# Patient Record
Sex: FEMALE | Race: WHITE | NOT HISPANIC OR LATINO | Employment: FULL TIME | URBAN - METROPOLITAN AREA
[De-identification: names, ages, dates, MRNs, and addresses within clinical notes are randomized per-mention and may not be internally consistent; named-entity substitution may affect disease eponyms.]

---

## 2017-01-13 ENCOUNTER — COMMUNICATION - HEALTHEAST (OUTPATIENT)
Dept: FAMILY MEDICINE | Facility: CLINIC | Age: 52
End: 2017-01-13

## 2017-01-13 DIAGNOSIS — E78.00 HYPERCHOLESTEREMIA: ICD-10-CM

## 2017-01-27 ENCOUNTER — COMMUNICATION - HEALTHEAST (OUTPATIENT)
Dept: FAMILY MEDICINE | Facility: CLINIC | Age: 52
End: 2017-01-27

## 2017-01-31 ENCOUNTER — HOSPITAL ENCOUNTER (OUTPATIENT)
Dept: MAMMOGRAPHY | Facility: HOSPITAL | Age: 52
Discharge: HOME | End: 2017-01-31
Attending: FAMILY MEDICINE

## 2017-01-31 DIAGNOSIS — Z12.31 VISIT FOR SCREENING MAMMOGRAM: ICD-10-CM

## 2017-02-17 ENCOUNTER — COMMUNICATION - HEALTHEAST (OUTPATIENT)
Dept: FAMILY MEDICINE | Facility: CLINIC | Age: 52
End: 2017-02-17

## 2017-02-17 DIAGNOSIS — F34.1 DYSTHYMIC DISORDER: ICD-10-CM

## 2017-04-15 ENCOUNTER — COMMUNICATION - HEALTHEAST (OUTPATIENT)
Dept: FAMILY MEDICINE | Facility: CLINIC | Age: 52
End: 2017-04-15

## 2017-04-15 DIAGNOSIS — E78.00 HYPERCHOLESTEREMIA: ICD-10-CM

## 2017-04-20 ENCOUNTER — OFFICE VISIT - HEALTHEAST (OUTPATIENT)
Dept: FAMILY MEDICINE | Facility: CLINIC | Age: 52
End: 2017-04-20

## 2017-04-20 DIAGNOSIS — F34.1 DYSTHYMIC DISORDER: ICD-10-CM

## 2017-04-20 DIAGNOSIS — E78.00 HYPERCHOLESTEREMIA: ICD-10-CM

## 2017-04-20 LAB
CHOLEST SERPL-MCNC: 236 MG/DL
FASTING STATUS PATIENT QL REPORTED: NO
HDLC SERPL-MCNC: 54 MG/DL
LDLC SERPL CALC-MCNC: 148 MG/DL
TRIGL SERPL-MCNC: 172 MG/DL

## 2017-04-20 RX ORDER — IPRATROPIUM BROMIDE 42 UG/1
SPRAY, METERED NASAL
Refills: 0 | Status: SHIPPED | COMMUNITY
Start: 2017-02-01 | End: 2021-12-03

## 2017-04-20 ASSESSMENT — MIFFLIN-ST. JEOR: SCORE: 1386.26

## 2017-04-21 ENCOUNTER — COMMUNICATION - HEALTHEAST (OUTPATIENT)
Dept: FAMILY MEDICINE | Facility: CLINIC | Age: 52
End: 2017-04-21

## 2017-04-21 DIAGNOSIS — R53.83 FATIGUE: ICD-10-CM

## 2017-04-24 ENCOUNTER — COMMUNICATION - HEALTHEAST (OUTPATIENT)
Dept: FAMILY MEDICINE | Facility: CLINIC | Age: 52
End: 2017-04-24

## 2017-06-01 ENCOUNTER — COMMUNICATION - HEALTHEAST (OUTPATIENT)
Dept: FAMILY MEDICINE | Facility: CLINIC | Age: 52
End: 2017-06-01

## 2017-06-01 DIAGNOSIS — F34.1 DYSTHYMIC DISORDER: ICD-10-CM

## 2017-06-05 ENCOUNTER — COMMUNICATION - HEALTHEAST (OUTPATIENT)
Dept: FAMILY MEDICINE | Facility: CLINIC | Age: 52
End: 2017-06-05

## 2017-06-05 DIAGNOSIS — F41.1 ANXIETY STATE: ICD-10-CM

## 2017-06-07 ENCOUNTER — COMMUNICATION - HEALTHEAST (OUTPATIENT)
Dept: FAMILY MEDICINE | Facility: CLINIC | Age: 52
End: 2017-06-07

## 2017-06-23 ENCOUNTER — RECORDS - HEALTHEAST (OUTPATIENT)
Dept: ADMINISTRATIVE | Facility: OTHER | Age: 52
End: 2017-06-23

## 2017-07-06 ENCOUNTER — AMBULATORY - HEALTHEAST (OUTPATIENT)
Dept: LAB | Facility: CLINIC | Age: 52
End: 2017-07-06

## 2017-07-06 DIAGNOSIS — R53.83 FATIGUE: ICD-10-CM

## 2017-07-21 ENCOUNTER — COMMUNICATION - HEALTHEAST (OUTPATIENT)
Dept: FAMILY MEDICINE | Facility: CLINIC | Age: 52
End: 2017-07-21

## 2017-07-21 DIAGNOSIS — E78.00 HYPERCHOLESTEREMIA: ICD-10-CM

## 2017-12-21 ENCOUNTER — COMMUNICATION - HEALTHEAST (OUTPATIENT)
Dept: FAMILY MEDICINE | Facility: CLINIC | Age: 52
End: 2017-12-21

## 2018-01-04 ENCOUNTER — RECORDS - HEALTHEAST (OUTPATIENT)
Dept: ADMINISTRATIVE | Facility: OTHER | Age: 53
End: 2018-01-04

## 2018-01-07 ENCOUNTER — COMMUNICATION - HEALTHEAST (OUTPATIENT)
Dept: FAMILY MEDICINE | Facility: CLINIC | Age: 53
End: 2018-01-07

## 2018-01-07 DIAGNOSIS — F34.1 DYSTHYMIC DISORDER: ICD-10-CM

## 2018-01-23 ENCOUNTER — COMMUNICATION - HEALTHEAST (OUTPATIENT)
Dept: FAMILY MEDICINE | Facility: CLINIC | Age: 53
End: 2018-01-23

## 2018-01-23 DIAGNOSIS — E78.00 HYPERCHOLESTEREMIA: ICD-10-CM

## 2018-02-13 ENCOUNTER — RECORDS - HEALTHEAST (OUTPATIENT)
Dept: ADMINISTRATIVE | Facility: OTHER | Age: 53
End: 2018-02-13

## 2018-03-07 ENCOUNTER — OFFICE VISIT - HEALTHEAST (OUTPATIENT)
Dept: FAMILY MEDICINE | Facility: CLINIC | Age: 53
End: 2018-03-07

## 2018-03-07 DIAGNOSIS — E78.00 PURE HYPERCHOLESTEROLEMIA: ICD-10-CM

## 2018-03-07 DIAGNOSIS — M25.569 KNEE PAIN: ICD-10-CM

## 2018-03-07 DIAGNOSIS — Z00.00 ROUTINE GENERAL MEDICAL EXAMINATION AT A HEALTH CARE FACILITY: ICD-10-CM

## 2018-03-07 DIAGNOSIS — F34.1 DYSTHYMIC DISORDER: ICD-10-CM

## 2018-03-07 ASSESSMENT — MIFFLIN-ST. JEOR: SCORE: 1408.95

## 2018-03-12 LAB
HPV SOURCE: NORMAL
HUMAN PAPILLOMA VIRUS 16 DNA: NEGATIVE
HUMAN PAPILLOMA VIRUS 18 DNA: NEGATIVE
HUMAN PAPILLOMA VIRUS FINAL DIAGNOSIS: NORMAL
HUMAN PAPILLOMA VIRUS OTHER HR: NEGATIVE
SPECIMEN DESCRIPTION: NORMAL

## 2018-03-15 LAB
BKR LAB AP ABNORMAL BLEEDING: NO
BKR LAB AP BIRTH CONTROL/HORMONES: NORMAL
BKR LAB AP CERVICAL APPEARANCE: NORMAL
BKR LAB AP GYN ADEQUACY: NORMAL
BKR LAB AP GYN INTERPRETATION: NORMAL
BKR LAB AP HPV REFLEX: NORMAL
BKR LAB AP LMP: NORMAL
BKR LAB AP PATIENT STATUS: NORMAL
BKR LAB AP PREVIOUS ABNORMAL: NO
BKR LAB AP PREVIOUS NORMAL: 2015
HIGH RISK?: NO
PATH REPORT.COMMENTS IMP SPEC: NORMAL
RESULT FLAG (HE HISTORICAL CONVERSION): NORMAL

## 2018-06-02 ENCOUNTER — COMMUNICATION - HEALTHEAST (OUTPATIENT)
Dept: FAMILY MEDICINE | Facility: CLINIC | Age: 53
End: 2018-06-02

## 2018-06-25 ENCOUNTER — COMMUNICATION - HEALTHEAST (OUTPATIENT)
Dept: FAMILY MEDICINE | Facility: CLINIC | Age: 53
End: 2018-06-25

## 2018-06-25 DIAGNOSIS — F34.1 DYSTHYMIC DISORDER: ICD-10-CM

## 2018-07-20 ENCOUNTER — COMMUNICATION - HEALTHEAST (OUTPATIENT)
Dept: FAMILY MEDICINE | Facility: CLINIC | Age: 53
End: 2018-07-20

## 2018-07-20 DIAGNOSIS — E78.00 HYPERCHOLESTEREMIA: ICD-10-CM

## 2018-10-09 ENCOUNTER — COMMUNICATION - HEALTHEAST (OUTPATIENT)
Dept: FAMILY MEDICINE | Facility: CLINIC | Age: 53
End: 2018-10-09

## 2018-10-30 ENCOUNTER — COMMUNICATION - HEALTHEAST (OUTPATIENT)
Dept: FAMILY MEDICINE | Facility: CLINIC | Age: 53
End: 2018-10-30

## 2019-03-15 ENCOUNTER — COMMUNICATION - HEALTHEAST (OUTPATIENT)
Dept: FAMILY MEDICINE | Facility: CLINIC | Age: 54
End: 2019-03-15

## 2019-03-15 DIAGNOSIS — F34.1 DYSTHYMIC DISORDER: ICD-10-CM

## 2019-03-29 ENCOUNTER — COMMUNICATION - HEALTHEAST (OUTPATIENT)
Dept: FAMILY MEDICINE | Facility: CLINIC | Age: 54
End: 2019-03-29

## 2019-03-29 DIAGNOSIS — F41.1 ANXIETY STATE: ICD-10-CM

## 2019-05-07 ENCOUNTER — COMMUNICATION - HEALTHEAST (OUTPATIENT)
Dept: FAMILY MEDICINE | Facility: CLINIC | Age: 54
End: 2019-05-07

## 2019-05-07 DIAGNOSIS — E78.00 HYPERCHOLESTEREMIA: ICD-10-CM

## 2019-05-20 ENCOUNTER — RECORDS - HEALTHEAST (OUTPATIENT)
Dept: ADMINISTRATIVE | Facility: OTHER | Age: 54
End: 2019-05-20

## 2019-07-30 ENCOUNTER — COMMUNICATION - HEALTHEAST (OUTPATIENT)
Dept: FAMILY MEDICINE | Facility: CLINIC | Age: 54
End: 2019-07-30

## 2019-07-30 DIAGNOSIS — F34.1 DYSTHYMIC DISORDER: ICD-10-CM

## 2019-08-13 ENCOUNTER — COMMUNICATION - HEALTHEAST (OUTPATIENT)
Dept: FAMILY MEDICINE | Facility: CLINIC | Age: 54
End: 2019-08-13

## 2019-08-13 DIAGNOSIS — E78.00 HYPERCHOLESTEREMIA: ICD-10-CM

## 2019-08-25 ENCOUNTER — COMMUNICATION - HEALTHEAST (OUTPATIENT)
Dept: FAMILY MEDICINE | Facility: CLINIC | Age: 54
End: 2019-08-25

## 2019-08-25 DIAGNOSIS — F34.1 DYSTHYMIC DISORDER: ICD-10-CM

## 2019-09-23 ENCOUNTER — OFFICE VISIT - HEALTHEAST (OUTPATIENT)
Dept: FAMILY MEDICINE | Facility: CLINIC | Age: 54
End: 2019-09-23

## 2019-09-23 DIAGNOSIS — E66.9 OBESITY (BMI 30-39.9): ICD-10-CM

## 2019-09-23 DIAGNOSIS — F34.1 DYSTHYMIC DISORDER: ICD-10-CM

## 2019-09-23 DIAGNOSIS — Z78.0 POST-MENOPAUSAL: ICD-10-CM

## 2019-09-23 DIAGNOSIS — F10.11 ALCOHOL ABUSE, IN REMISSION: ICD-10-CM

## 2019-09-23 DIAGNOSIS — Z00.00 ROUTINE HISTORY AND PHYSICAL EXAMINATION OF ADULT: ICD-10-CM

## 2019-09-23 DIAGNOSIS — C50.919 MALIGNANT NEOPLASM OF FEMALE BREAST, UNSPECIFIED ESTROGEN RECEPTOR STATUS, UNSPECIFIED LATERALITY, UNSPECIFIED SITE OF BREAST (H): ICD-10-CM

## 2019-09-23 DIAGNOSIS — E78.00 HYPERCHOLESTEREMIA: ICD-10-CM

## 2019-09-23 LAB
ALBUMIN SERPL-MCNC: 4.1 G/DL (ref 3.5–5)
ALP SERPL-CCNC: 69 U/L (ref 45–120)
ALT SERPL W P-5'-P-CCNC: 29 U/L (ref 0–45)
ANION GAP SERPL CALCULATED.3IONS-SCNC: 6 MMOL/L (ref 5–18)
AST SERPL W P-5'-P-CCNC: 20 U/L (ref 0–40)
BILIRUB SERPL-MCNC: 0.3 MG/DL (ref 0–1)
BUN SERPL-MCNC: 21 MG/DL (ref 8–22)
CALCIUM SERPL-MCNC: 9.7 MG/DL (ref 8.5–10.5)
CHLORIDE BLD-SCNC: 108 MMOL/L (ref 98–107)
CHOLEST SERPL-MCNC: 294 MG/DL
CO2 SERPL-SCNC: 26 MMOL/L (ref 22–31)
CREAT SERPL-MCNC: 0.81 MG/DL (ref 0.6–1.1)
FASTING STATUS PATIENT QL REPORTED: YES
GFR SERPL CREATININE-BSD FRML MDRD: >60 ML/MIN/1.73M2
GLUCOSE BLD-MCNC: 103 MG/DL (ref 70–125)
HDLC SERPL-MCNC: 59 MG/DL
LDLC SERPL CALC-MCNC: 198 MG/DL
POTASSIUM BLD-SCNC: 4.5 MMOL/L (ref 3.5–5)
PROT SERPL-MCNC: 7.1 G/DL (ref 6–8)
SODIUM SERPL-SCNC: 140 MMOL/L (ref 136–145)
TRIGL SERPL-MCNC: 184 MG/DL
TSH SERPL DL<=0.005 MIU/L-ACNC: 4.77 UIU/ML (ref 0.3–5)

## 2019-09-23 ASSESSMENT — ANXIETY QUESTIONNAIRES
4. TROUBLE RELAXING: SEVERAL DAYS
7. FEELING AFRAID AS IF SOMETHING AWFUL MIGHT HAPPEN: SEVERAL DAYS
GAD7 TOTAL SCORE: 9
5. BEING SO RESTLESS THAT IT IS HARD TO SIT STILL: NOT AT ALL
2. NOT BEING ABLE TO STOP OR CONTROL WORRYING: SEVERAL DAYS
IF YOU CHECKED OFF ANY PROBLEMS ON THIS QUESTIONNAIRE, HOW DIFFICULT HAVE THESE PROBLEMS MADE IT FOR YOU TO DO YOUR WORK, TAKE CARE OF THINGS AT HOME, OR GET ALONG WITH OTHER PEOPLE: SOMEWHAT DIFFICULT
3. WORRYING TOO MUCH ABOUT DIFFERENT THINGS: MORE THAN HALF THE DAYS
6. BECOMING EASILY ANNOYED OR IRRITABLE: MORE THAN HALF THE DAYS
1. FEELING NERVOUS, ANXIOUS, OR ON EDGE: MORE THAN HALF THE DAYS

## 2019-09-23 ASSESSMENT — MIFFLIN-ST. JEOR: SCORE: 1449.66

## 2019-09-23 ASSESSMENT — PATIENT HEALTH QUESTIONNAIRE - PHQ9: SUM OF ALL RESPONSES TO PHQ QUESTIONS 1-9: 11

## 2019-09-26 ENCOUNTER — COMMUNICATION - HEALTHEAST (OUTPATIENT)
Dept: FAMILY MEDICINE | Facility: CLINIC | Age: 54
End: 2019-09-26

## 2019-09-27 ENCOUNTER — COMMUNICATION - HEALTHEAST (OUTPATIENT)
Dept: FAMILY MEDICINE | Facility: CLINIC | Age: 54
End: 2019-09-27

## 2019-09-27 ENCOUNTER — AMBULATORY - HEALTHEAST (OUTPATIENT)
Dept: FAMILY MEDICINE | Facility: CLINIC | Age: 54
End: 2019-09-27

## 2019-09-27 DIAGNOSIS — F34.1 DYSTHYMIC DISORDER: ICD-10-CM

## 2019-09-27 DIAGNOSIS — E78.00 HYPERCHOLESTEREMIA: ICD-10-CM

## 2019-10-07 ENCOUNTER — AMBULATORY - HEALTHEAST (OUTPATIENT)
Dept: SCHEDULING | Facility: CLINIC | Age: 54
End: 2019-10-07

## 2019-10-07 DIAGNOSIS — Z78.0 POST-MENOPAUSAL: ICD-10-CM

## 2019-10-07 DIAGNOSIS — C50.919 MALIGNANT NEOPLASM OF FEMALE BREAST, UNSPECIFIED ESTROGEN RECEPTOR STATUS, UNSPECIFIED LATERALITY, UNSPECIFIED SITE OF BREAST (H): ICD-10-CM

## 2019-11-27 ENCOUNTER — HOSPITAL ENCOUNTER (OUTPATIENT)
Dept: MAMMOGRAPHY | Facility: CLINIC | Age: 54
Discharge: HOME | End: 2019-11-27

## 2019-11-27 DIAGNOSIS — Z12.31 VISIT FOR SCREENING MAMMOGRAM: ICD-10-CM

## 2019-11-29 ENCOUNTER — OFFICE VISIT - HEALTHEAST (OUTPATIENT)
Dept: INTERNAL MEDICINE | Facility: CLINIC | Age: 54
End: 2019-11-29

## 2019-11-29 DIAGNOSIS — M54.2 NECK PAIN: ICD-10-CM

## 2019-12-16 ENCOUNTER — COMMUNICATION - HEALTHEAST (OUTPATIENT)
Dept: FAMILY MEDICINE | Facility: CLINIC | Age: 54
End: 2019-12-16

## 2019-12-16 DIAGNOSIS — F34.1 DYSTHYMIC DISORDER: ICD-10-CM

## 2020-01-16 ENCOUNTER — OFFICE VISIT - HEALTHEAST (OUTPATIENT)
Dept: SURGERY | Facility: CLINIC | Age: 55
End: 2020-01-16

## 2020-01-16 ENCOUNTER — AMBULATORY - HEALTHEAST (OUTPATIENT)
Dept: LAB | Facility: CLINIC | Age: 55
End: 2020-01-16

## 2020-01-16 DIAGNOSIS — R63.2 HYPERPHAGIA: ICD-10-CM

## 2020-01-16 DIAGNOSIS — E78.5 DYSLIPIDEMIA: ICD-10-CM

## 2020-01-16 DIAGNOSIS — E78.00 PURE HYPERCHOLESTEROLEMIA: ICD-10-CM

## 2020-01-16 DIAGNOSIS — E66.811 OBESITY, CLASS I, BMI 30.0-34.9 (SEE ACTUAL BMI): ICD-10-CM

## 2020-01-16 DIAGNOSIS — F50.20 BULIMIA NERVOSA: ICD-10-CM

## 2020-01-16 DIAGNOSIS — F32.A DEPRESSION, UNSPECIFIED DEPRESSION TYPE: ICD-10-CM

## 2020-01-16 DIAGNOSIS — M19.90 ARTHRITIS: ICD-10-CM

## 2020-01-16 DIAGNOSIS — M25.569 KNEE PAIN, UNSPECIFIED CHRONICITY, UNSPECIFIED LATERALITY: ICD-10-CM

## 2020-01-16 LAB
ERYTHROCYTE [DISTWIDTH] IN BLOOD BY AUTOMATED COUNT: 13.4 % (ref 11–14.5)
FERRITIN SERPL-MCNC: 110 NG/ML (ref 10–130)
FOLATE SERPL-MCNC: 15.2 NG/ML
HBA1C MFR BLD: 5.5 % (ref 3.5–6)
HCT VFR BLD AUTO: 40.6 % (ref 35–47)
HGB BLD-MCNC: 13.3 G/DL (ref 12–16)
MCH RBC QN AUTO: 30.7 PG (ref 27–34)
MCHC RBC AUTO-ENTMCNC: 32.8 G/DL (ref 32–36)
MCV RBC AUTO: 94 FL (ref 80–100)
PLATELET # BLD AUTO: 316 THOU/UL (ref 140–440)
PMV BLD AUTO: 9 FL (ref 8.5–12.5)
PTH-INTACT SERPL-MCNC: 33 PG/ML (ref 10–86)
RBC # BLD AUTO: 4.33 MILL/UL (ref 3.8–5.4)
VIT B12 SERPL-MCNC: 832 PG/ML (ref 213–816)
WBC: 4.6 THOU/UL (ref 4–11)

## 2020-01-16 ASSESSMENT — MIFFLIN-ST. JEOR
SCORE: 1453.18
SCORE: 1467.69

## 2020-01-21 LAB — VIT B1 PYROPHOSHATE BLD-SCNC: 125 NMOL/L (ref 70–180)

## 2020-01-27 ENCOUNTER — OFFICE VISIT - HEALTHEAST (OUTPATIENT)
Dept: FAMILY MEDICINE | Facility: CLINIC | Age: 55
End: 2020-01-27

## 2020-01-27 DIAGNOSIS — E66.811 OBESITY (BMI 30.0-34.9): ICD-10-CM

## 2020-01-27 DIAGNOSIS — F32.A DEPRESSION, UNSPECIFIED DEPRESSION TYPE: ICD-10-CM

## 2020-01-27 DIAGNOSIS — N95.0 POSTMENOPAUSAL BLEEDING: ICD-10-CM

## 2020-01-27 DIAGNOSIS — C50.919 MALIGNANT NEOPLASM OF FEMALE BREAST, UNSPECIFIED ESTROGEN RECEPTOR STATUS, UNSPECIFIED LATERALITY, UNSPECIFIED SITE OF BREAST (H): ICD-10-CM

## 2020-01-27 LAB
BASOPHILS # BLD AUTO: 0 THOU/UL (ref 0–0.2)
BASOPHILS NFR BLD AUTO: 1 % (ref 0–2)
EOSINOPHIL # BLD AUTO: 0.2 THOU/UL (ref 0–0.4)
EOSINOPHIL NFR BLD AUTO: 3 % (ref 0–6)
ERYTHROCYTE [DISTWIDTH] IN BLOOD BY AUTOMATED COUNT: 12.5 % (ref 11–14.5)
HCT VFR BLD AUTO: 39.6 % (ref 35–47)
HGB BLD-MCNC: 13.7 G/DL (ref 12–16)
LYMPHOCYTES # BLD AUTO: 2.4 THOU/UL (ref 0.8–4.4)
LYMPHOCYTES NFR BLD AUTO: 50 % (ref 20–40)
MCH RBC QN AUTO: 31.2 PG (ref 27–34)
MCHC RBC AUTO-ENTMCNC: 34.6 G/DL (ref 32–36)
MCV RBC AUTO: 90 FL (ref 80–100)
MONOCYTES # BLD AUTO: 0.4 THOU/UL (ref 0–0.9)
MONOCYTES NFR BLD AUTO: 8 % (ref 2–10)
NEUTROPHILS # BLD AUTO: 1.8 THOU/UL (ref 2–7.7)
NEUTROPHILS NFR BLD AUTO: 38 % (ref 50–70)
PLATELET # BLD AUTO: 295 THOU/UL (ref 140–440)
PMV BLD AUTO: 6.5 FL (ref 7–10)
RBC # BLD AUTO: 4.4 MILL/UL (ref 3.8–5.4)
WBC: 4.7 THOU/UL (ref 4–11)

## 2020-01-27 ASSESSMENT — ANXIETY QUESTIONNAIRES
GAD7 TOTAL SCORE: 5
2. NOT BEING ABLE TO STOP OR CONTROL WORRYING: NOT AT ALL
6. BECOMING EASILY ANNOYED OR IRRITABLE: MORE THAN HALF THE DAYS
4. TROUBLE RELAXING: SEVERAL DAYS
3. WORRYING TOO MUCH ABOUT DIFFERENT THINGS: NOT AT ALL
IF YOU CHECKED OFF ANY PROBLEMS ON THIS QUESTIONNAIRE, HOW DIFFICULT HAVE THESE PROBLEMS MADE IT FOR YOU TO DO YOUR WORK, TAKE CARE OF THINGS AT HOME, OR GET ALONG WITH OTHER PEOPLE: SOMEWHAT DIFFICULT
1. FEELING NERVOUS, ANXIOUS, OR ON EDGE: SEVERAL DAYS
7. FEELING AFRAID AS IF SOMETHING AWFUL MIGHT HAPPEN: NOT AT ALL
5. BEING SO RESTLESS THAT IT IS HARD TO SIT STILL: SEVERAL DAYS

## 2020-01-27 ASSESSMENT — PATIENT HEALTH QUESTIONNAIRE - PHQ9: SUM OF ALL RESPONSES TO PHQ QUESTIONS 1-9: 9

## 2020-01-28 ENCOUNTER — COMMUNICATION - HEALTHEAST (OUTPATIENT)
Dept: SURGERY | Facility: CLINIC | Age: 55
End: 2020-01-28

## 2020-02-04 ENCOUNTER — HOSPITAL ENCOUNTER (OUTPATIENT)
Dept: ULTRASOUND IMAGING | Facility: HOSPITAL | Age: 55
Discharge: HOME | End: 2020-02-04

## 2020-02-04 DIAGNOSIS — N95.0 POSTMENOPAUSAL BLEEDING: ICD-10-CM

## 2020-02-06 ENCOUNTER — COMMUNICATION - HEALTHEAST (OUTPATIENT)
Dept: FAMILY MEDICINE | Facility: CLINIC | Age: 55
End: 2020-02-06

## 2020-02-11 ENCOUNTER — COMMUNICATION - HEALTHEAST (OUTPATIENT)
Dept: SURGERY | Facility: CLINIC | Age: 55
End: 2020-02-11

## 2020-03-03 ENCOUNTER — COMMUNICATION - HEALTHEAST (OUTPATIENT)
Dept: FAMILY MEDICINE | Facility: CLINIC | Age: 55
End: 2020-03-03

## 2020-03-03 ENCOUNTER — COMMUNICATION - HEALTHEAST (OUTPATIENT)
Dept: SURGERY | Facility: CLINIC | Age: 55
End: 2020-03-03

## 2020-03-18 ENCOUNTER — COMMUNICATION - HEALTHEAST (OUTPATIENT)
Dept: FAMILY MEDICINE | Facility: CLINIC | Age: 55
End: 2020-03-18

## 2020-03-18 DIAGNOSIS — F34.1 DYSTHYMIC DISORDER: ICD-10-CM

## 2020-03-23 ENCOUNTER — COMMUNICATION - HEALTHEAST (OUTPATIENT)
Dept: FAMILY MEDICINE | Facility: CLINIC | Age: 55
End: 2020-03-23

## 2020-03-23 DIAGNOSIS — F34.1 DYSTHYMIC DISORDER: ICD-10-CM

## 2020-03-27 ENCOUNTER — COMMUNICATION - HEALTHEAST (OUTPATIENT)
Dept: SURGERY | Facility: CLINIC | Age: 55
End: 2020-03-27

## 2020-03-27 DIAGNOSIS — E78.5 DYSLIPIDEMIA: ICD-10-CM

## 2020-03-27 DIAGNOSIS — M25.569 KNEE PAIN, UNSPECIFIED CHRONICITY, UNSPECIFIED LATERALITY: ICD-10-CM

## 2020-03-27 DIAGNOSIS — R63.2 HYPERPHAGIA: ICD-10-CM

## 2020-03-27 DIAGNOSIS — F32.A DEPRESSION, UNSPECIFIED DEPRESSION TYPE: ICD-10-CM

## 2020-03-27 DIAGNOSIS — M19.90 ARTHRITIS: ICD-10-CM

## 2020-03-31 ENCOUNTER — OFFICE VISIT - HEALTHEAST (OUTPATIENT)
Dept: SURGERY | Facility: CLINIC | Age: 55
End: 2020-03-31

## 2020-03-31 DIAGNOSIS — E66.3 OVERWEIGHT (BMI 25.0-29.9): ICD-10-CM

## 2020-03-31 DIAGNOSIS — Z71.3 NUTRITIONAL COUNSELING: ICD-10-CM

## 2020-03-31 ASSESSMENT — MIFFLIN-ST. JEOR: SCORE: 1403.28

## 2020-04-23 ENCOUNTER — OFFICE VISIT - HEALTHEAST (OUTPATIENT)
Dept: SURGERY | Facility: CLINIC | Age: 55
End: 2020-04-23

## 2020-04-23 DIAGNOSIS — M25.569 KNEE PAIN, UNSPECIFIED CHRONICITY, UNSPECIFIED LATERALITY: ICD-10-CM

## 2020-04-23 DIAGNOSIS — R63.2 HYPERPHAGIA: ICD-10-CM

## 2020-04-23 DIAGNOSIS — F32.A DEPRESSION, UNSPECIFIED DEPRESSION TYPE: ICD-10-CM

## 2020-04-23 DIAGNOSIS — E78.5 DYSLIPIDEMIA: ICD-10-CM

## 2020-04-23 DIAGNOSIS — E66.811 OBESITY (BMI 30.0-34.9): ICD-10-CM

## 2020-04-23 DIAGNOSIS — M19.90 ARTHRITIS: ICD-10-CM

## 2020-04-23 ASSESSMENT — MIFFLIN-ST. JEOR: SCORE: 1439.57

## 2020-05-12 ENCOUNTER — COMMUNICATION - HEALTHEAST (OUTPATIENT)
Dept: SURGERY | Facility: CLINIC | Age: 55
End: 2020-05-12

## 2020-05-22 ENCOUNTER — COMMUNICATION - HEALTHEAST (OUTPATIENT)
Dept: ADMINISTRATIVE | Facility: CLINIC | Age: 55
End: 2020-05-22

## 2020-07-21 ENCOUNTER — COMMUNICATION - HEALTHEAST (OUTPATIENT)
Dept: FAMILY MEDICINE | Facility: CLINIC | Age: 55
End: 2020-07-21

## 2020-07-21 DIAGNOSIS — E78.00 PURE HYPERCHOLESTEROLEMIA: ICD-10-CM

## 2020-07-22 RX ORDER — ATORVASTATIN CALCIUM 20 MG/1
20 TABLET, FILM COATED ORAL DAILY
Qty: 90 TABLET | Refills: 3 | Status: SHIPPED | OUTPATIENT
Start: 2020-07-22 | End: 2021-09-03

## 2020-08-31 ENCOUNTER — COMMUNICATION - HEALTHEAST (OUTPATIENT)
Dept: FAMILY MEDICINE | Facility: CLINIC | Age: 55
End: 2020-08-31

## 2020-08-31 DIAGNOSIS — F34.1 DYSTHYMIC DISORDER: ICD-10-CM

## 2021-02-25 ENCOUNTER — COMMUNICATION - HEALTHEAST (OUTPATIENT)
Dept: FAMILY MEDICINE | Facility: CLINIC | Age: 56
End: 2021-02-25

## 2021-02-25 DIAGNOSIS — F34.1 DYSTHYMIC DISORDER: ICD-10-CM

## 2021-03-16 ENCOUNTER — COMMUNICATION - HEALTHEAST (OUTPATIENT)
Dept: FAMILY MEDICINE | Facility: CLINIC | Age: 56
End: 2021-03-16

## 2021-03-16 DIAGNOSIS — F34.1 DYSTHYMIC DISORDER: ICD-10-CM

## 2021-03-18 ENCOUNTER — COMMUNICATION - HEALTHEAST (OUTPATIENT)
Dept: FAMILY MEDICINE | Facility: CLINIC | Age: 56
End: 2021-03-18

## 2021-04-20 ENCOUNTER — COMMUNICATION - HEALTHEAST (OUTPATIENT)
Dept: FAMILY MEDICINE | Facility: CLINIC | Age: 56
End: 2021-04-20

## 2021-04-20 ENCOUNTER — OFFICE VISIT - HEALTHEAST (OUTPATIENT)
Dept: FAMILY MEDICINE | Facility: CLINIC | Age: 56
End: 2021-04-20

## 2021-04-20 DIAGNOSIS — L81.4 LENTIGO: ICD-10-CM

## 2021-04-20 DIAGNOSIS — F32.1 MODERATE MAJOR DEPRESSION (H): ICD-10-CM

## 2021-04-20 DIAGNOSIS — Z00.00 ROUTINE HISTORY AND PHYSICAL EXAMINATION OF ADULT: ICD-10-CM

## 2021-04-20 DIAGNOSIS — F34.1 DYSTHYMIC DISORDER: ICD-10-CM

## 2021-04-20 DIAGNOSIS — Z12.11 SPECIAL SCREENING FOR MALIGNANT NEOPLASMS, COLON: ICD-10-CM

## 2021-04-20 DIAGNOSIS — C50.919 MALIGNANT NEOPLASM OF FEMALE BREAST, UNSPECIFIED ESTROGEN RECEPTOR STATUS, UNSPECIFIED LATERALITY, UNSPECIFIED SITE OF BREAST (H): ICD-10-CM

## 2021-04-20 DIAGNOSIS — E78.00 PURE HYPERCHOLESTEROLEMIA: ICD-10-CM

## 2021-04-20 DIAGNOSIS — N39.0 URINARY TRACT INFECTION: ICD-10-CM

## 2021-04-20 DIAGNOSIS — E66.9 OBESITY (BMI 30-39.9): ICD-10-CM

## 2021-04-20 LAB
ALBUMIN SERPL-MCNC: 4.1 G/DL (ref 3.5–5)
ALBUMIN UR-MCNC: ABNORMAL G/DL
ALP SERPL-CCNC: 83 U/L (ref 45–120)
ALT SERPL W P-5'-P-CCNC: 23 U/L (ref 0–45)
ANION GAP SERPL CALCULATED.3IONS-SCNC: 12 MMOL/L (ref 5–18)
APPEARANCE UR: CLEAR
AST SERPL W P-5'-P-CCNC: 17 U/L (ref 0–40)
BACTERIA #/AREA URNS HPF: ABNORMAL /[HPF]
BASOPHILS # BLD AUTO: 0.1 THOU/UL (ref 0–0.2)
BASOPHILS NFR BLD AUTO: 1 % (ref 0–2)
BILIRUB SERPL-MCNC: 0.2 MG/DL (ref 0–1)
BILIRUB UR QL STRIP: NEGATIVE
BUN SERPL-MCNC: 21 MG/DL (ref 8–22)
CALCIUM SERPL-MCNC: 8.7 MG/DL (ref 8.5–10.5)
CHLORIDE BLD-SCNC: 102 MMOL/L (ref 98–107)
CHOLEST SERPL-MCNC: 226 MG/DL
CO2 SERPL-SCNC: 22 MMOL/L (ref 22–31)
COLOR UR AUTO: YELLOW
CREAT SERPL-MCNC: 1.06 MG/DL (ref 0.6–1.1)
EOSINOPHIL # BLD AUTO: 0.2 THOU/UL (ref 0–0.4)
EOSINOPHIL NFR BLD AUTO: 4 % (ref 0–6)
ERYTHROCYTE [DISTWIDTH] IN BLOOD BY AUTOMATED COUNT: 13.4 % (ref 11–14.5)
FASTING STATUS PATIENT QL REPORTED: NO
GFR SERPL CREATININE-BSD FRML MDRD: 54 ML/MIN/1.73M2
GLUCOSE BLD-MCNC: 96 MG/DL (ref 70–125)
GLUCOSE UR STRIP-MCNC: NEGATIVE MG/DL
HCT VFR BLD AUTO: 40.9 % (ref 35–47)
HDLC SERPL-MCNC: 50 MG/DL
HGB BLD-MCNC: 13.8 G/DL (ref 12–16)
HGB UR QL STRIP: NEGATIVE
HIV 1+2 AB+HIV1 P24 AG SERPL QL IA: NEGATIVE
IMM GRANULOCYTES # BLD: 0 THOU/UL
IMM GRANULOCYTES NFR BLD: 0 %
KETONES UR STRIP-MCNC: ABNORMAL MG/DL
LDLC SERPL CALC-MCNC: 123 MG/DL
LEUKOCYTE ESTERASE UR QL STRIP: ABNORMAL
LYMPHOCYTES # BLD AUTO: 2 THOU/UL (ref 0.8–4.4)
LYMPHOCYTES NFR BLD AUTO: 42 % (ref 20–40)
MCH RBC QN AUTO: 30.1 PG (ref 27–34)
MCHC RBC AUTO-ENTMCNC: 33.7 G/DL (ref 32–36)
MCV RBC AUTO: 89 FL (ref 80–100)
MONOCYTES # BLD AUTO: 0.5 THOU/UL (ref 0–0.9)
MONOCYTES NFR BLD AUTO: 11 % (ref 2–10)
NEUTROPHILS # BLD AUTO: 2 THOU/UL (ref 2–7.7)
NEUTROPHILS NFR BLD AUTO: 42 % (ref 50–70)
NITRATE UR QL: NEGATIVE
PH UR STRIP: 7.5 [PH] (ref 5–8)
PLATELET # BLD AUTO: 273 THOU/UL (ref 140–440)
PMV BLD AUTO: 8.2 FL (ref 7–10)
POTASSIUM BLD-SCNC: 3.9 MMOL/L (ref 3.5–5)
PROT SERPL-MCNC: 6.9 G/DL (ref 6–8)
RBC # BLD AUTO: 4.59 MILL/UL (ref 3.8–5.4)
RBC #/AREA URNS AUTO: ABNORMAL HPF
SODIUM SERPL-SCNC: 136 MMOL/L (ref 136–145)
SP GR UR STRIP: 1.02 (ref 1–1.03)
SQUAMOUS #/AREA URNS AUTO: ABNORMAL LPF
TRIGL SERPL-MCNC: 266 MG/DL
UROBILINOGEN UR STRIP-ACNC: ABNORMAL
WBC #/AREA URNS AUTO: ABNORMAL HPF
WBC: 4.8 THOU/UL (ref 4–11)

## 2021-04-20 ASSESSMENT — ANXIETY QUESTIONNAIRES
2. NOT BEING ABLE TO STOP OR CONTROL WORRYING: SEVERAL DAYS
5. BEING SO RESTLESS THAT IT IS HARD TO SIT STILL: NOT AT ALL
IF YOU CHECKED OFF ANY PROBLEMS ON THIS QUESTIONNAIRE, HOW DIFFICULT HAVE THESE PROBLEMS MADE IT FOR YOU TO DO YOUR WORK, TAKE CARE OF THINGS AT HOME, OR GET ALONG WITH OTHER PEOPLE: SOMEWHAT DIFFICULT
GAD7 TOTAL SCORE: 6
1. FEELING NERVOUS, ANXIOUS, OR ON EDGE: SEVERAL DAYS
4. TROUBLE RELAXING: SEVERAL DAYS
6. BECOMING EASILY ANNOYED OR IRRITABLE: MORE THAN HALF THE DAYS
7. FEELING AFRAID AS IF SOMETHING AWFUL MIGHT HAPPEN: NOT AT ALL
3. WORRYING TOO MUCH ABOUT DIFFERENT THINGS: SEVERAL DAYS

## 2021-04-20 ASSESSMENT — MIFFLIN-ST. JEOR: SCORE: 1436.85

## 2021-04-20 ASSESSMENT — PATIENT HEALTH QUESTIONNAIRE - PHQ9: SUM OF ALL RESPONSES TO PHQ QUESTIONS 1-9: 11

## 2021-04-21 LAB
BACTERIA SPEC CULT: NO GROWTH
HCV AB SERPL QL IA: NEGATIVE

## 2021-04-21 RX ORDER — VENLAFAXINE HYDROCHLORIDE 75 MG/1
150 CAPSULE, EXTENDED RELEASE ORAL DAILY
Qty: 180 CAPSULE | Refills: 3 | Status: SHIPPED | OUTPATIENT
Start: 2021-04-21 | End: 2022-05-12

## 2021-04-29 ENCOUNTER — RECORDS - HEALTHEAST (OUTPATIENT)
Dept: FAMILY MEDICINE | Facility: CLINIC | Age: 56
End: 2021-04-29

## 2021-05-21 ENCOUNTER — COMMUNICATION - HEALTHEAST (OUTPATIENT)
Dept: FAMILY MEDICINE | Facility: CLINIC | Age: 56
End: 2021-05-21

## 2021-05-21 DIAGNOSIS — F34.1 DYSTHYMIC DISORDER: ICD-10-CM

## 2021-05-23 ENCOUNTER — HEALTH MAINTENANCE LETTER (OUTPATIENT)
Age: 56
End: 2021-05-23

## 2021-05-23 RX ORDER — BUPROPION HYDROCHLORIDE 150 MG/1
150 TABLET ORAL DAILY
Qty: 90 TABLET | Refills: 3 | Status: SHIPPED | OUTPATIENT
Start: 2021-05-23 | End: 2022-06-09

## 2021-05-25 ENCOUNTER — RECORDS - HEALTHEAST (OUTPATIENT)
Dept: ADMINISTRATIVE | Facility: CLINIC | Age: 56
End: 2021-05-25

## 2021-05-26 ENCOUNTER — RECORDS - HEALTHEAST (OUTPATIENT)
Dept: FAMILY MEDICINE | Facility: CLINIC | Age: 56
End: 2021-05-26

## 2021-05-26 ENCOUNTER — RECORDS - HEALTHEAST (OUTPATIENT)
Dept: ADMINISTRATIVE | Facility: CLINIC | Age: 56
End: 2021-05-26

## 2021-05-26 DIAGNOSIS — Z12.31 VISIT FOR SCREENING MAMMOGRAM: ICD-10-CM

## 2021-05-26 ASSESSMENT — PATIENT HEALTH QUESTIONNAIRE - PHQ9: SUM OF ALL RESPONSES TO PHQ QUESTIONS 1-9: 11

## 2021-05-27 ASSESSMENT — PATIENT HEALTH QUESTIONNAIRE - PHQ9
SUM OF ALL RESPONSES TO PHQ QUESTIONS 1-9: 11
SUM OF ALL RESPONSES TO PHQ QUESTIONS 1-9: 9

## 2021-05-27 NOTE — TELEPHONE ENCOUNTER
Controlled Substance Refill Request  Medication:   Requested Prescriptions     Pending Prescriptions Disp Refills     LORazepam (ATIVAN) 1 MG tablet [Pharmacy Med Name: LORAZEPAM 1 MG TABLET] 15 tablet 0     Sig: TAKE 1 TABLET (1 MG TOTAL) BY MOUTH 3 (THREE) TIMES A DAY AS NEEDED FOR ANXIETY.     Date Last Fill: 10/10/18 # 15 tabs  Pharmacy: [facility] Submit electronically to pharmacy  Controlled Substance Agreement on File:   Encounter-Level CSA Scan Date:    There are no encounter-level csa scan date.       Last office visit: 4/20/2017 Thalia Reynoso MD

## 2021-05-28 ENCOUNTER — RECORDS - HEALTHEAST (OUTPATIENT)
Dept: ADMINISTRATIVE | Facility: CLINIC | Age: 56
End: 2021-05-28

## 2021-05-28 ASSESSMENT — ANXIETY QUESTIONNAIRES
GAD7 TOTAL SCORE: 5
GAD7 TOTAL SCORE: 9
GAD7 TOTAL SCORE: 6

## 2021-05-28 NOTE — TELEPHONE ENCOUNTER
RN cannot approve Refill Request    RN can NOT refill this medication Protocol failed and NO refill given. Last office visit: 4/20/2017 Thalia Reynoso MD Last Physical: 3/7/2018 Last MTM visit: Visit date not found Last visit same specialty: 4/20/2017 Thalia Reynoso MD.  Next visit within 3 mo: Visit date not found  Next physical within 3 mo: Visit date not found      Aline Gale, [facility] Triage/Med Refill 5/7/2019    Requested Prescriptions   Pending Prescriptions Disp Refills     lovastatin (MEVACOR) 40 MG tablet 90 tablet 2     Sig: Take 1 tablet (40 mg total) by mouth daily.       Statins Refill Protocol (Hmg CoA Reductase Inhibitors) Failed - 5/7/2019  8:52 AM        Failed - PCP or prescribing provider visit in past 12 months      Last office visit with prescriber/PCP: 4/20/2017 Thalia Reynoso MD OR same dept: Visit date not found OR same specialty: 4/20/2017 Thalia Reynoso MD  Last physical: 3/7/2018 Last MTM visit: Visit date not found   Next visit within 3 mo: Visit date not found  Next physical within 3 mo: Visit date not found  Prescriber OR PCP: Thalia Reynoso MD  Last diagnosis associated with med order: 1. Hypercholesteremia  - lovastatin (MEVACOR) 40 MG tablet; Take 1 tablet (40 mg total) by mouth daily.  Dispense: 90 tablet; Refill: 2    If protocol passes may refill for 12 months if within 3 months of last provider visit (or a total of 15 months).              Former patient of Dr Reynoso & has not established care with another provider.  Please assign refill request to covering provider per Clinic standard process.

## 2021-05-29 ENCOUNTER — RECORDS - HEALTHEAST (OUTPATIENT)
Dept: ADMINISTRATIVE | Facility: CLINIC | Age: 56
End: 2021-05-29

## 2021-05-30 ENCOUNTER — RECORDS - HEALTHEAST (OUTPATIENT)
Dept: ADMINISTRATIVE | Facility: CLINIC | Age: 56
End: 2021-05-30

## 2021-05-30 VITALS — BODY MASS INDEX: 27.72 KG/M2 | HEIGHT: 66 IN | WEIGHT: 172.5 LBS

## 2021-05-30 NOTE — TELEPHONE ENCOUNTER
RN cannot approve Refill Request    RN can NOT refill this medication overdue for office visits and/or labs.    Vahe Martinez, [facility] Triage/Med Refill 7/30/2019    Requested Prescriptions   Pending Prescriptions Disp Refills     venlafaxine (EFFEXOR-XR) 75 MG 24 hr capsule 270 capsule 3       Venlafaxine/Desvenlafaxine Refill Protocol Failed - 7/30/2019  1:18 PM        Failed - LFT or AST or ALT in last year     Albumin   Date Value Ref Range Status   08/20/2017 3.7 3.5 - 5.0 g/dL Final     Bilirubin, Total   Date Value Ref Range Status   08/20/2017 0.1 0.0 - 1.0 mg/dL Final     Bilirubin, Direct   Date Value Ref Range Status   04/25/2014 <0.1 <0.6 mg/dL Final     Alkaline Phosphatase   Date Value Ref Range Status   08/20/2017 63 45 - 120 U/L Final     AST   Date Value Ref Range Status   08/20/2017 22 0 - 40 U/L Final     ALT   Date Value Ref Range Status   08/20/2017 23 0 - 45 U/L Final     Protein, Total   Date Value Ref Range Status   08/20/2017 6.7 6.0 - 8.0 g/dL Final                Failed - Fasting lipid cascade in last year     Cholesterol   Date Value Ref Range Status   04/20/2017 236 (H) <=199 mg/dL Final     Triglycerides   Date Value Ref Range Status   04/20/2017 172 (H) <=149 mg/dL Final     HDL Cholesterol   Date Value Ref Range Status   04/20/2017 54 >=50 mg/dL Final     LDL Calculated   Date Value Ref Range Status   04/20/2017 148 (H) <=129 mg/dL Final     Patient Fasting > 8hrs?   Date Value Ref Range Status   04/20/2017 No  Final             Failed - PCP or prescribing provider visit in last year     Last office visit with prescriber/PCP: 4/20/2017 Thalia Reynoso MD OR same dept: Visit date not found OR same specialty: 4/20/2017 Thalia Reynoso MD  Last physical: 3/7/2018 Last MTM visit: Visit date not found   Next visit within 3 mo: Visit date not found  Next physical within 3 mo: Visit date not found  Prescriber OR PCP: Thalia Reynoso MD  Last diagnosis associated with med order:  1. Depression With Anxiety  - venlafaxine (EFFEXOR-XR) 75 MG 24 hr capsule  Dispense: 270 capsule; Refill: 3    If protocol passes may refill for 12 months if within 3 months of last provider visit (or a total of 15 months).             Failed - Blood Pressure in last year     BP Readings from Last 1 Encounters:   03/07/18 120/78

## 2021-05-31 NOTE — TELEPHONE ENCOUNTER
RN cannot approve Refill Request    RN can NOT refill this medication Protocol failed and NO refill given. Last office visit: 9/1/2015 Murray Avalos MD Last Physical: Visit date not found Last MTM visit: Visit date not found Last visit same specialty: 4/20/2017 Thalia Reynoso MD.  Next visit within 3 mo: Visit date not found  Next physical within 3 mo: Visit date not found      Nica Sky, [facility] Connection Triage/Med Refill 8/25/2019    Requested Prescriptions   Pending Prescriptions Disp Refills     venlafaxine (EFFEXOR-XR) 75 MG 24 hr capsule [Pharmacy Med Name: VENLAFAXINE HCL ER 75 MG CAP] 90 capsule 0     Sig: TAKE 1 CAPSULE (75 MG TOTAL) BY MOUTH 3 (THREE) TIMES A DAY.       Venlafaxine/Desvenlafaxine Refill Protocol Failed - 8/25/2019 12:33 PM        Failed - LFT or AST or ALT in last year     Albumin   Date Value Ref Range Status   08/20/2017 3.7 3.5 - 5.0 g/dL Final     Bilirubin, Total   Date Value Ref Range Status   08/20/2017 0.1 0.0 - 1.0 mg/dL Final     Bilirubin, Direct   Date Value Ref Range Status   04/25/2014 <0.1 <0.6 mg/dL Final     Alkaline Phosphatase   Date Value Ref Range Status   08/20/2017 63 45 - 120 U/L Final     AST   Date Value Ref Range Status   08/20/2017 22 0 - 40 U/L Final     ALT   Date Value Ref Range Status   08/20/2017 23 0 - 45 U/L Final     Protein, Total   Date Value Ref Range Status   08/20/2017 6.7 6.0 - 8.0 g/dL Final                Failed - Fasting lipid cascade in last year     Cholesterol   Date Value Ref Range Status   04/20/2017 236 (H) <=199 mg/dL Final     Triglycerides   Date Value Ref Range Status   04/20/2017 172 (H) <=149 mg/dL Final     HDL Cholesterol   Date Value Ref Range Status   04/20/2017 54 >=50 mg/dL Final     LDL Calculated   Date Value Ref Range Status   04/20/2017 148 (H) <=129 mg/dL Final     Patient Fasting > 8hrs?   Date Value Ref Range Status   04/20/2017 No  Final             Failed - PCP or prescribing provider visit in last  year     Last office visit with prescriber/PCP: 9/1/2015 Murray Avalos MD OR same dept: Visit date not found OR same specialty: 4/20/2017 Thalia Reynoso MD  Last physical: Visit date not found Last MTM visit: Visit date not found   Next visit within 3 mo: Visit date not found  Next physical within 3 mo: Visit date not found  Prescriber OR PCP: Murray Avalos MD  Last diagnosis associated with med order: 1. Depression With Anxiety  - venlafaxine (EFFEXOR-XR) 75 MG 24 hr capsule [Pharmacy Med Name: VENLAFAXINE HCL ER 75 MG CAP]; Take 1 capsule (75 mg total) by mouth 3 (three) times a day.  Dispense: 90 capsule; Refill: 0    If protocol passes may refill for 12 months if within 3 months of last provider visit (or a total of 15 months).             Failed - Blood Pressure in last year     BP Readings from Last 1 Encounters:   03/07/18 120/78

## 2021-05-31 NOTE — TELEPHONE ENCOUNTER
RN cannot approve Refill Request    RN can NOT refill this medication PCP messaged that patient is overdue for Labs and Physical  and med is not covered by policy/route to provider. Last office visit: 4/20/2017 Thalia Reynoso MD Last Physical: 3/7/2018 Last MTM visit: Visit date not found Last visit same specialty: 4/20/2017 Thalia Reynoso MD.  Next visit within 3 mo: Visit date not found  Next physical within 3 mo: Visit date not found      Yanelis Anders, [facility] Connection Triage/Med Refill 8/13/2019    Requested Prescriptions   Pending Prescriptions Disp Refills     lovastatin (MEVACOR) 40 MG tablet 90 tablet 0     Sig: Take 1 tablet (40 mg total) by mouth daily.       Statins Refill Protocol (Hmg CoA Reductase Inhibitors) Failed - 8/13/2019  7:40 AM        Failed - PCP or prescribing provider visit in past 12 months      Last office visit with prescriber/PCP: 4/20/2017 Thalia Reynoso MD OR same dept: Visit date not found OR same specialty: 4/20/2017 Thalia Reynoso MD  Last physical: 3/7/2018 Last MTM visit: Visit date not found   Next visit within 3 mo: Visit date not found  Next physical within 3 mo: Visit date not found  Prescriber OR PCP: Thalia Reynoso MD  Last diagnosis associated with med order: 1. Hypercholesteremia  - lovastatin (MEVACOR) 40 MG tablet; Take 1 tablet (40 mg total) by mouth daily.  Dispense: 90 tablet; Refill: 0    If protocol passes may refill for 12 months if within 3 months of last provider visit (or a total of 15 months).

## 2021-05-31 NOTE — TELEPHONE ENCOUNTER
Please inform patient that a refill was sent to her pharmacy  She needs to schedule her annual physical and establish care with a new provider at clinic

## 2021-06-01 VITALS — HEIGHT: 65 IN | WEIGHT: 178.7 LBS | BODY MASS INDEX: 29.77 KG/M2

## 2021-06-01 NOTE — PROGRESS NOTES
FEMALE ADULT PREVENTIVE EXAM    ASSESSMENT:   Nubia So  was seen today for annual exam.  1. Routine history and physical examination of adult  Ambulatory referral to Bariatric Care: Surgical and Non-Surgical   2. Depression With Anxiety  venlafaxine (EFFEXOR-XR) 75 MG 24 hr capsule    buPROPion (WELLBUTRIN XL) 150 MG 24 hr tablet   3. Hypercholesteremia  lovastatin (MEVACOR) 40 MG tablet    Lipid [place]    Comprehensive Metabolic Panel   4. Malignant neoplasm of female breast, unspecified estrogen receptor status, unspecified laterality, unspecified site of breast (H)  DXA Bone Density Scan   5. Obesity (BMI 30-39.9)  Thyroid [place]    Ambulatory referral to Bariatric Care: Surgical and Non-Surgical   6. Post-menopausal  DXA Bone Density Scan   7. Alcohol abuse, in remission       At the time of documentation her lipid panel shows total.  A result message was sent stating the need to start a statin.  However she is already on lovastatin so I sent her an email stating that if she is not taking lovastatin then she should take it regularly or if we should consider switching to Lipitor/Zocor.      Breast cancer: Follows every other year with [facility]. In remission. Diagnosed 2011.  Advised that she make follow-up appointment for mammogram and to follow with the specialist.    For obesity,.  Discussion and patient is amenable to following with bariatric clinic and is interested in the 24-week program.  A referral is created.    For hypercholesterolemia she is going to continue with current medication.    Postmenopausal with hot flashes: Continue with current medication and we can consider further addition of medication if symptoms are getting worse.  She will follow-up as needed.    Depression and anxiety are stable.  Anxiety can vary with parents current health issues.  However she feels stable.  She declines lorazepam refill as that entails a CSA and a drug urine screen and with her history of alcohol  use, she does not want to be on any medication that can cause further issues.  The prescription was canceled.    PLAN:     begin progressive daily aerobic exercise program, follow a low fat, low cholesterol diet, attempt to lose weight, attend health education classes for weight control, improve dietary compliance, continue current medications, continue current healthy lifestyle patterns and return for routine annual checkups      CHIEF COMPLAINT:      Chief Complaint   Patient presents with     Establish Care     Pt here today to establish care with a new provider- former Pt of Dr Reynoso     Annual Exam     Fasting labs, Last Pap 3/7/2018         SUBJECTIVE:  Nubia So is a 53 y.o. female   presents today for an annual physical examination.   She c/o hot flashes and weight gain.    Medical issues to be addressed today are as follows.    1 1: Weight gain: Patient has had weight gain over the years and she is very concerned about her current weight.  In the past she used to run however because of knee pain she finds it difficult to run.  She was advised physical therapy that she did not pursue.  She has been trying to eat healthy.  She has a past history of eating disorder.  She is interested in further help.  We talked about bariatric 24-week program and she is interested to pursue it.  A referral will be done    2: Depression and anxiety: Patient has history of depression and anxiety.  And, in the past family situation with spouse was stressful.  They have gone to counseling and this is better now.  However, since then her dad has passed away from congestive heart failure that he did not disclose to family members.  Mom in her 70s has dementia.  This is stressful to her sister and herself.  She used to be on lorazepam as needed with limited supply.  We discussed about controlled substance agreement and drug urine screen.  Knowing what it entails and the possible risk of dependence, patient declines any future  refills.  She believes that she should be able to handle this with counseling and a referral will be done today.  She will continue on her bupropion and venlafaxine that she uses for her hot flashes too.    3  Hot flashes: Patient has had hot flashes while she went to tamoxifen for her breast cancer, subsequently, she resumed menstrual cycle.  Now she believes she is in menopause as her  her LMP was in April.  She informs me that she is not a candidate for hormonal replacement therapy due to the fact that she was on tamoxifen.  We discussed continued use of venlafaxine and possible other agents.  At this time she will monitor.    4: Breast cancer: She has not had a mammogram since January 2017.  Before that she was pursuing yearly mammogram.  Her breast cancer was in 2011 and she has been in remission.  She follows with [facility].  She follows with them every other year and I have asked her to schedule a follow-up appointment.      Nubia So  has a past medical history  has a past medical history of Breast cancer (H) (2011), Cancer (H) (2010), Chemical dependency (H) (quit 2014), antineoplastic chemotherapy (2011), and radiation therapy (2011).        Surgeries:      Past Surgical History:   Procedure Laterality Date     BREAST BIOPSY Right 2011    malignant     RI EXCISE BREAST CYST Right 2011    Description: Breast Surgery Lumpectomy;  Proc Date: 05/01/2011;         Family History:  family history includes Breast cancer in her cousin, paternal aunt, and paternal aunt; Dementia (age of onset: 70) in her mother; Heart failure in her father; No Medical Problems in her sister.    Social History:   reports that she has never smoked. She has never used smokeless tobacco. She reports current alcohol use. She reports that she does not use drugs.    Medications:    Current Outpatient Medications on File Prior to Visit   Medication Sig Dispense Refill     multivitamin therapeutic (THERAGRAN) tablet Take 1 tablet  "by mouth daily.       [DISCONTINUED] buPROPion (WELLBUTRIN XL) 150 MG 24 hr tablet TAKE ONE TABLET BY MOUTH ONE TIME DAILY 90 tablet 1     [DISCONTINUED] lovastatin (MEVACOR) 40 MG tablet Take 1 tablet (40 mg total) by mouth daily. 90 tablet 0     [DISCONTINUED] venlafaxine (EFFEXOR-XR) 75 MG 24 hr capsule TAKE 1 CAPSULE (75 MG TOTAL) BY MOUTH 3 (THREE) TIMES A DAY. 90 capsule 0     ipratropium (ATROVENT) 0.06 % nasal spray INSTILL 2 SPRAYS INTO EACH NOSTRIL 3 (THREE) TIMES A DAY FOR 4 DAYS.  0     [DISCONTINUED] LORazepam (ATIVAN) 1 MG tablet TAKE 1 TABLET (1 MG TOTAL) BY MOUTH 3 (THREE) TIMES A DAY AS NEEDED FOR ANXIETY. 15 tablet 0     No current facility-administered medications on file prior to visit.          Allergies:    Allergies   Allergen Reactions     Sulfa (Sulfonamide Antibiotics) Rash         HEALTH MAINTENANCE:  Pap- 2018 NORMAL  Mammography: 2017- Normal  Colon/Flex Sig: performed in 2016. Next due in 2021.  DEXA: Never       Healthy Habits:   Regular Exercise: Yes  Sunscreen Use: Yes  Healthy Diet: Yes  Dental Visits Regularly: Yes  Seat Belt: Yes  Sexually active: Yes  Self Breast Exam Monthly:Yes  Hemoccults: No  Flex Sig: No  Colonoscopy: Yes  Lipid Profile: Yes  Glucose Screen: Yes  Prevention of Osteoporosis: Yes and No  Last Dexa: No        REVIEW OF SYSTEMS:  Complete head to toe review of systems is otherwise negative except as above.  Nubia So denies any fever, cough, loss of appetite, heart issues, GI issues, new skin lesions, endocrine issues.  She informs me she feels well.      OBJECTIVE:  VITAL SIGNS: /77 (Patient Site: Left Arm, Patient Position: Sitting, Cuff Size: Adult Large)   Pulse (!) 102   Resp 16   Ht 5' 5.24\" (1.657 m)   Wt 187 lb 6.4 oz (85 kg)   LMP 04/15/2019 (Approximate) Comment: Premenopausal  SpO2 97%   BMI 30.96 kg/m      GENERAL:  Patient alert, in no acute distress.  EYES: PERRLA. Extraoccular movements intact, pupils equal, reactive to light and " accommodation.  Normal conjunctiva and lids.  ENT:  Hearing grossly normal.  Normal appearance to ears and nose.  Bilateral TM s, external canals, oropharynx normal. Normal lips, gums and teeth.   NECK:  Supple, without thyromegaly or mass.  RESP:  Clear to auscultation without crackles, wheezes or distress.  Normal respiratory effort.   CV:  Regular rate and rhythm without murmurs, rubs or gallops.  Normal carotid, abdominal aorta, femoral and pedal pulses.  No varicosities or edema.  ABDOMEN:  Soft, non-tender, without hepatosplenomegaly, masses, or hernias.   BREASTS:  Nontender, without masses, nipple discharge, erythema, or axillary adenopathy.    LYMPHATIC: Normal palpation of neck, and axilla..  No lymphadenopathy.   NEURO:   motor & sensory function all intact.  DTR and reflexes normal.  PSYCHIATRIC:  Alert & oriented with normal mood and affect.  Good judgment and insight.  SKIN:  Normal inspection and palpation.  MUSCULOSKELETAL: Normal gait and station.

## 2021-06-02 ENCOUNTER — RECORDS - HEALTHEAST (OUTPATIENT)
Dept: ADMINISTRATIVE | Facility: CLINIC | Age: 56
End: 2021-06-02

## 2021-06-03 VITALS
HEIGHT: 65 IN | DIASTOLIC BLOOD PRESSURE: 77 MMHG | BODY MASS INDEX: 31.22 KG/M2 | WEIGHT: 187.4 LBS | OXYGEN SATURATION: 97 % | SYSTOLIC BLOOD PRESSURE: 124 MMHG | RESPIRATION RATE: 16 BRPM | HEART RATE: 102 BPM

## 2021-06-03 NOTE — PATIENT INSTRUCTIONS - HE
Please follow up if you have any further issues.    You may contact me by phone or [employer] if you are worsening or if things are not improving.    Thank you for coming in today.

## 2021-06-03 NOTE — PROGRESS NOTES
Results of tests sent to patient via [employer].    Please look under the Labs tab for specific communication.    Nolan Buckley MD  [facility]  11/29/2019, 10:18 PM

## 2021-06-04 VITALS
OXYGEN SATURATION: 98 % | RESPIRATION RATE: 16 BRPM | BODY MASS INDEX: 31.49 KG/M2 | SYSTOLIC BLOOD PRESSURE: 110 MMHG | HEIGHT: 65 IN | WEIGHT: 189 LBS | DIASTOLIC BLOOD PRESSURE: 80 MMHG | HEART RATE: 88 BPM

## 2021-06-04 VITALS
HEART RATE: 84 BPM | OXYGEN SATURATION: 98 % | WEIGHT: 191.4 LBS | SYSTOLIC BLOOD PRESSURE: 116 MMHG | BODY MASS INDEX: 31.85 KG/M2 | DIASTOLIC BLOOD PRESSURE: 84 MMHG | RESPIRATION RATE: 16 BRPM

## 2021-06-04 VITALS
SYSTOLIC BLOOD PRESSURE: 128 MMHG | OXYGEN SATURATION: 98 % | BODY MASS INDEX: 32.05 KG/M2 | WEIGHT: 194 LBS | HEART RATE: 99 BPM | DIASTOLIC BLOOD PRESSURE: 82 MMHG

## 2021-06-04 VITALS — WEIGHT: 178 LBS | HEIGHT: 65 IN | BODY MASS INDEX: 29.66 KG/M2

## 2021-06-04 VITALS — WEIGHT: 188.7 LBS | HEIGHT: 66 IN | BODY MASS INDEX: 30.33 KG/M2

## 2021-06-04 VITALS — HEIGHT: 65 IN | WEIGHT: 186 LBS | BODY MASS INDEX: 30.99 KG/M2

## 2021-06-04 NOTE — TELEPHONE ENCOUNTER
Requested Prescriptions     Pending Prescriptions Disp Refills     venlafaxine (EFFEXOR-XR) 75 MG 24 hr capsule 90 capsule 3     Sig: Take 1 capsule (75 mg total) by mouth 3 (three) times a day.     Last Office Visit:  09/23/2019  Nubia So  was seen today for annual exam.  1. Routine history and physical examination of adult  Ambulatory referral to Bariatric Care: Surgical and Non-Surgical   2. Depression With Anxiety  venlafaxine (EFFEXOR-XR) 75 MG 24 hr capsule     buPROPion (WELLBUTRIN XL) 150 MG 24 hr tablet   3. Hypercholesteremia  lovastatin (MEVACOR) 40 MG tablet     Lipid [place]     Comprehensive Metabolic Panel   4. Malignant neoplasm of female breast, unspecified estrogen receptor status, unspecified laterality, unspecified site of breast (H)  DXA Bone Density Scan   5. Obesity (BMI 30-39.9)  Thyroid [place]     Ambulatory referral to Bariatric Care: Surgical and Non-Surgical   6. Post-menopausal  DXA Bone Density Scan   7. Alcohol abuse, in remission          Last Refill:    Disp Refills Start End SOHAN    venlafaxine (EFFEXOR-XR) 75 MG 24 hr capsule 90 capsule 0 9/23/2019  No   Sig - Route: Take 1 capsule (75 mg total) by mouth 3 (three) times a day. - Oral   Sent to pharmacy as: venlafaxine ER 75 mg capsule,extended release 24 hr (EFFEXOR-XR)       CSA:  N/A  Date of Last Labs:     Ref Range & Units 9/23/19 0909     Sodium 136 - 145 mmol/L 140     Potassium 3.5 - 5.0 mmol/L 4.5     Chloride 98 - 107 mmol/L 108High      CO2 22 - 31 mmol/L 26     Anion Gap, Calculation 5 - 18 mmol/L 6     Glucose 70 - 125 mg/dL 103     BUN 8 - 22 mg/dL 21     Creatinine 0.60 - 1.10 mg/dL 0.81     GFR MDRD Af Amer >60 mL/min/1.73m2 >60     GFR MDRD Non Af Amer >60 mL/min/1.73m2 >60     Bilirubin, Total 0.0 - 1.0 mg/dL 0.3     Calcium 8.5 - 10.5 mg/dL 9.7     Protein, Total 6.0 - 8.0 g/dL 7.1     Albumin 3.5 - 5.0 g/dL 4.1     Alkaline Phosphatase 45 - 120 U/L 69     AST 0 - 40 U/L 20     ALT 0 - 45 U/L 29

## 2021-06-04 NOTE — TELEPHONE ENCOUNTER
Refill needs to go to [facility], [facility] is what the pharmacy uses to send the Rx requests to us

## 2021-06-04 NOTE — TELEPHONE ENCOUNTER
Refill Request  Did you contact pharmacy: Yes She called refill in last week but pharmacy did not get it to refills.  Please note patient is out of medication and requesting it be filled ASAP.   Medication name:   Requested Prescriptions     Pending Prescriptions Disp Refills     venlafaxine (EFFEXOR-XR) 75 MG 24 hr capsule 90 capsule 3     Sig: Take 1 capsule (75 mg total) by mouth 3 (three) times a day.     Who prescribed the medication: Royal Stewart MD     Pharmacy Name and Location: [facility]  Is patient out of medication: Yes  Patient notified refills processed in 72 hours:  yes  Okay to leave a detailed message: no

## 2021-06-05 VITALS
HEIGHT: 65 IN | OXYGEN SATURATION: 95 % | BODY MASS INDEX: 30.89 KG/M2 | WEIGHT: 185.4 LBS | SYSTOLIC BLOOD PRESSURE: 128 MMHG | HEART RATE: 78 BPM | DIASTOLIC BLOOD PRESSURE: 82 MMHG

## 2021-06-05 NOTE — PATIENT INSTRUCTIONS - HE
For post menopausal bleeding, We will do US and refer to GYN    Take all your medicine as before    Come for Lab work for cholesterol done before you se me    Continue with Weight loss program

## 2021-06-05 NOTE — PROGRESS NOTES
"    New Medical Weight Management Consult    PATIENT:  Nubia So  MRN:         [id number]  :         1965  DAVID:         2020    Dear Royal Stewart MD,    I had the pleasure of seeing your patient, Nubia So.  Full intake/assessment done to determine barriers to weight loss success and develop a treatment plan.  Nubia oS is a 54 y.o. female interested in treatment of medical problems associated with weight.  Her weight today is Weight: 189 lb (85.7 kg) (2020 10:47 AM), Body mass index is 31.45 kg/m ., and she has the following co-morbidities:  Dyslipidemia  Knee pain  Fatigue  Poor body image  Depression and anxiety    Patient goals reviewed with patient 2020   I am interested in attaining a healthier weight to diminish current health problems related to co-morbid conditions: Yes   I am interested in attaining a healthier weight in order to prevent future health problems: Yes   Are you pursing bariatric surgery? No   Are you pursuing transplant surgery? No       Referring Provider 2020   Please name the provider who referred you to Medical Weight Management.  If you do not know, please answer: \"I Don't Know\". Dr. Royal Stewart (my Family Practitioner)       Wt Readings from Last 4 Encounters:   20 189 lb (85.7 kg)   20 188 lb 11.2 oz (85.6 kg)   19 194 lb (88 kg)   19 187 lb 6.4 oz (85 kg)       Weight History Reviewed With Patient 2020   How concerned are you about your weight? Very Concerned   Would you describe your weight gain as gradual? Yes   I became overweight: As an Adult   The following factors have contributed to my weight gain:  Starting on Medication for Mental Health, A Health Crisis / Stress, Eating Wrong Types of Food, Eating Too Much, Genetic (Runs in the Family)   The most weight I have ever lost was: (lbs) 25   My lowest weight since age 18 was: 128   My highest weight since age 18 was: 187   I have the following family history " of obesity/being overweight:  My father is overweight, One or more of my siblings are overweight, Many of my relatives are overweight   Has anyone in your family had weight loss surgery? Yes       Diet Recall Reviewed With Patient 1/16/2020   Do you typically eat breakfast? Yes   Do you typically eat lunch? Yes   Do you typically eat supper? Yes   Do you typically eat snacks? No       Eating Habits Reviewed With Patient 1/16/2020   Generally, my meals include foods like these: bread, pasta, rice, potatoes, corn, crackers, sweet dessert, pop, or juice. Almost Everyday   Generally, my meals include foods like these: fried meats, brats, burgers, french fries, pizza, cheese, chips, or ice cream. A Few Times a Week   Eat fast food (like [employer], [employer], [employer]aco Bell). Never   Eat at a buffet or sit-down restaurant. A Few Times a Week   Eat most of my meals in front of the TV or computer. A Few Times a Week   Often skip meals, eat at random times, have no regular eating times. Almost Every Day   Rarely sit down for a meal but snack or graze throughout.  A Few Times a Week   Eat extra snacks between meals. A Few Times a Week   Eat most of my food at the end of the day. Never   Eat in the middle of the night or wake up at night to eat. Never   Eat extra snacks to prevent or correct low blood sugar. Never   Eat to prevent acid reflux or stomach pain. Never   Worry about not having enough food to eat. Never   Have you been to the food shelf at least a few times this year? No   I eat when I am depressed, stressed, anxious, or bored. A Few Times a Week   I eat when I am happy or as a reward. A Few Times a Week   I feel hungry all the time even if I just have eaten. A Few Times a Week   Feeling full is important to me. Never   Once I start eating, it is hard to stop. Almost Every Day   I finish all the food on my plate even if I am already full. Almost Every Day   I can't resist eating delicious food or walk past the good  food/smell. A Few Times a Week   I eat/snack without noticing that I am eating. A Few Times a Week   I eat when I am preparing the meal. A Few Times a Week   I eat more than usual when I see others eating. Almost Every Day   I have trouble not eating sweets, ice cream, cookies, or chips if they are around the house. Half of the Week   I think about food all day. Almost Every Day   What foods, if any, do you crave? Cheese   Please list any other foods you crave. Pizza itAlian carbs   I feel out of control when eating. Weekly   I eat a large amount of food, like a loaf of bread, a box of cookies, a pint/quart of ice cream, all at once. Never   I eat a large amount of food even when I am not hungry. Weekly   I eat rapidly. Every Day   I eat alone because I feel embarrassed and do not want others to see how much I have eaten. Weekly   I eat until I am uncomfortably full.  Weekly   I feel bad, disgusted, or guilty after I overeat. Every Day   I make myself vomit what I have eaten or use laxatives to get rid of food. Every Day       Activity/Exercise History Reviewed With Patient 1/16/2020   How much of a typical 12 hour day do you spend sitting? Half the Day   How much of a typical 12 hour day do you spend lying down? Less Than Half the Day   How much of a typical day do you spend walking/standing? Half the Day   How many hours (not including work) do you spend on the TV/Video Games/Computer/Tablet/Phone? 2-3 Hours   How many times a week are you active for the purpose of exercise? 2-3 Times a Week   How many total minutes do you spend doing some activity for the purpose of exercising when you exercise? 15-30 Minutes   What keeps you from being more active? Pain, Lack of Time       PAST MEDICAL HISTORY:  Past Medical History:   Diagnosis Date     Arthritis      Breast cancer (H) 2011    right     Cancer (H) 2010    Breast      Chemical dependency (H) quit 2014    Alcohol     Depression      Dyslipidemia      Hx  antineoplastic chemotherapy 2011    right lumpectomy     Hx of radiation therapy 2011    right breast     Knee pain      Menstrual disorder        Work/Social History Reviewed With Patient 1/16/2020   My employment status is: Full-Time   My job is:  -    How much of your job is spent on the computer or phone? 75%   What is your marital status?  / In a Relationship   If in a relationship, is your significant other overweight? Yes   Do you have children? Yes   If you have children, are they overweight? No       Mental Health History Reviewed With Patient 1/16/2020   Have you ever been physically or sexually abused? No   How often in the past 2 weeks have you felt little interest or pleasure in doing things? For Several Days   Over the past 2 weeks how often have you felt down, depressed, or hopeless? For Several Days       Sleep History Reviewed With Patient 1/16/2020   How many hours do you sleep at night? 8   Do you think that you snore loudly or has anybody ever heard you snore loudly (louder than talking or so loud it can be heard behind a shut door)? No   Has anyone seen or heard you stop breathing during your sleep? No   Do you often feel tired, fatigued, or sleepy during the day? No       MEDICATIONS:   Current Outpatient Medications   Medication Sig Dispense Refill     atorvastatin (LIPITOR) 20 MG tablet Take 1 tablet by mouth daily.       buPROPion (WELLBUTRIN XL) 150 MG 24 hr tablet Take 1 tablet (150 mg total) by mouth daily. 90 tablet 1     cyclobenzaprine (FLEXERIL) 5 MG tablet Take 1 tablet (5 mg total) by mouth at bedtime. 20 tablet 1     ipratropium (ATROVENT) 0.06 % nasal spray INSTILL 2 SPRAYS INTO EACH NOSTRIL 3 (THREE) TIMES A DAY FOR 4 DAYS.  0     multivitamin therapeutic (THERAGRAN) tablet Take 1 tablet by mouth daily.       venlafaxine (EFFEXOR-XR) 75 MG 24 hr capsule Take 1 capsule (75 mg total) by mouth 3 (three) times a day. 90 capsule 3     ketorolac  "(TORADOL) 10 mg tablet Take 1 tablet (10 mg total) by mouth 3 (three) times a day for 4 days. 12 tablet 0     phentermine (ADIPEX-P) 37.5 mg tablet Take 1/2 to 1 tablet in the morning. 90 tablet 0     No current facility-administered medications for this visit.        ALLERGIES:   Allergies   Allergen Reactions     Sulfa (Sulfonamide Antibiotics) Rash       PHYSICAL EXAM:  /80   Pulse 88   Resp 16   Ht 5' 5\" (1.651 m)   Wt 189 lb (85.7 kg)   SpO2 98%   Breastfeeding No   BMI 31.45 kg/m     A & O x 3  HEENT: NCAT, mucous membranes moist  Respirations unlabored      ASSESSMENT: Nubia is a 54 year old female, currently in perimenopausal period with remote history of restriction, binging and purging, and unhealthy relationship with food who is struggling with weight gain and health consequences of dyslipidemia, knee pain, fatigue, anxiety and lower self esteem.      Her problem is complicated by slowing of metabolism of maturity, hormone changes of perimenopause, unhealthy relationship with eating since youth as a runner and a mother who continues to make comments related to weight.       PLAN: We discussed the goal of establishing a healthier relationship with food. We discussed naltrexone as an \"anti-craving\" medication with her history of drinking, however with further discussion, the drinking noted in the chart was situational and she does not have an ongoing craving for alcohol-having 0-3 glasses of wine on weekends only.  Discussed perimenopause and weight gain/metabolism. Nubia has ongoing thoughts about food which phentermine addresses nicely. The side effect of focus and mental clarity may be helpful during this perimenopausal period where she describes a mental fog. If phentermine causes an increase in anxiety (on wellbutrin and effexor) would consider adding beta blocker or stopping the phentermine. She is ready to re-engage in her body pump classes which addresses both \"move\" and \"muscle\" to  " Optimize her metabolism. Will work on meal planning to decrease eating out to 2X/wk or less. Discussed the importance of restorative sleep and stress management in maintaining a healthy weight. She will see Ara again in February and me again in March. Labs related to fatigue and metabolics today. Await labs.         RTC:    Next month with Ara March with me.     TIME: 60 min spent on evaluation, management, counseling, education, & motivational interviewing with greater than 50 % of the total time was spent on counseling and coordinating care    Sincerely,    Kay Bateman MD, FAAFP

## 2021-06-05 NOTE — PROGRESS NOTES
Medical  Weight Loss Initial Diet Evaluation    Nubia is presenting today for a new weight management nutrition consultation. Pt has also had an initial appointment with Bariatrician Dr. Bateman.    Patient states she has had eating issues her whole life- history of eating disorders ([facility]), history of bulimia. Patient would like to be healthier and is struggling with weight gain. Control cravings, unable to stop eating when she is full.   Goal weight: 140-150lb  Nutrition Assessment:   Anthropometrics:  Pt's Initial Weight: 188.7 lbs  Weight: 188 lb 11.2 oz (85.6 kg)  Weight loss from initial: 0  % Weight loss: 0 %    BMI: Body mass index is 30.46 kg/m .  IBW: 125-135lb  Estimated RMR (Newburg-St Jeor equation): 1462kcal  Recommended protein needs: 75-100g    Medical History:  Patient Active Problem List   Diagnosis     Bulimia Nervosa     Urinary Tract Infection     Alcohol Abuse - Episodic     Depression With Anxiety     Hypercholesterolemia     Breast Cancer     Anxiety Disorder NOS     Alcohol abuse, in remission     Nutrition History:   Food allergies/intolerances/restrictions: Yes pine nuts  Biggest weight loss struggle per pt: patient states she has a hard time feeling full, frequent snacking in the evening   Vitamins/Mineral Supplementation:  multivitamin    Dietary Recall:  Wake up: 5:30a  Breakfast: 8a- hard boiled egg and greek yogurt  Snack: string cheese  Lunch: salad with 1/4 cup cottage cheese, tomatoes and a little bit of dressing (1 Tbsp)  Snack: none  Dinner: sandwich with egg salad  Snack: potato chips, pizza, cereal etc.   Overnight eating: No  Eating out: 2-3 times per week    Hydration (type/oz. per day):  Water: 64-96oz  Caffeine/carbonation: 1 cup black coffee in the morning, diet coke in morning  Juice: none  Skim milk: not very much- 3 at the most per week  Alcohol : weekends- 2 glasses of wine    Exercise:  Routine exercise established: Yes  2-3 times per week- Power pump class,  elliptical   Nutrition Diagnosis (PES statement):   Overweight/Obesity (NC 3.3) related to overeating and poor lifestyle habits as evidenced by frequent snacking in the evnings and BMI 31.45  Nutrition Intervention:  1. Food and/or nutrient delivery: encouraged three meals per day with 20-30g protein per meal, also encouraged carbohydrate with each meal 1/2-3/4 cup per meal  2. Nutrition Education: educated patient on plate method, lean protein sources, hunger and fullness   3. Nutrition Counseling: motivational interviewing, goal setting  Handouts provided:  Plate Method  List of Lean Protein Sources  Nutrition Monitoring/Evaluation:   Nutrition Goal Established by Patient:  1. 20-30g protein per meal   Follow up/Monitoring:  Will monitor weight change, protein intake, hydration, fruit and vegetable intake and exercise for next visit.  Follow up with RD in 2 months      Time Spent with patient 30 minutes  ABN: Yes

## 2021-06-05 NOTE — PATIENT INSTRUCTIONS - HE
[facility] Bariatric Basics    Remember to:    -Eat 3 meals a day (not 2, not 5) Chew your food well/SLOW down  -Eat your protein first  -Be a water drinker/Minize liquid calories (no regular pop, no juice) skim or 1% milk OK  -Sleep 7-8 hours each night. Address sleep if problematic  -Stress management is important. Address if problematic  -Move-8000 steps daily Muscle: maintain your muscle mass (strength training 2X/wk)  -Wheat, not white (bread, pasta, crackers, jennifer, bagels, tortillas, rice)  -Limit restaurant, cafeteria, take out, drive through to 2 times per week or less  -Minimize caffeine, alcohol, and night-time snacking  -Consider keeping a food diary (i.e. My Fitness Pal, Lose It, or other food tracker)  -Follow up with the dietitian      **Some lean proteins: chicken, turkey, tuna, salmon, crab, fish, shrimp, scallops, lobster, lean cuts of beef and pork, luncheon meats, veggie burgers, beans (black, lima, garbanzo, beach, kidney, refried), chile, cottage cheese, string cheese, other cheese, eggs, tofu, peanut butter, nuts, vegan crumbles, greek yogurt    MEDICATIONS FOR WEIGHT LOSS    PHENTERMINE (Adipex): approved in 1959 for appetite suppression.  It has stimulant effects and cannot be used with Ritalin, Concerta, or other stimulants.  It is not addictive although it's chemically related to amphetamines.  Amphetamines are addictive. The most common side effects are dry mouth, increased energy and concentration, increased pulse, and constipation.  You should not take phentermine if you have glaucoma, hyperthyroidism, or uncontrolled/untreated hypertension.  $24-$30 for 90 tablets    PHENDIMETRAZINE (Bontril): Appetite suppressant/sympathomimetic.  Controlled substance.  Side effects and contraindications similar to phentermine.  $45-$60 for 3 month supply    TOPIRAMATE (Topamax): Anti-seizure medication, also used to prevent migraines.  Side effects include paresthesia, glaucoma, altered concentration,  attention difficulties, memory and speech problems, metabolic acidosis, depression, increase in body temperature and decrease sweating, kidney stones, and weight loss.  Do not take Topamax while taking Depakote as this can cause high ammonia levels.  You must have reliable birth control as Topamax can cause birth defects.  Discontinue slowly to avoid seizure.  Insurance usually covers Topiramate.    QSYMIA (Phentermine + Topamax):  See above information about phentermine and Topamax.  Most common side effects are paresthesia, dizziness, distortion of taste, insomnia, constipation, and dry mouth.  $150-$220 per month    LORCASERIN (Belviq):  Approved in 2012. It is a serotonin 2C receptor agonist which reduces appetite and promotes satiety.  Average weight loss in 6000 patients was 3-3.7% over placebo.  Most common side effects include headache, dizziness, fatigue, nausea, dry mouth, and constipation. Lorcaserin should be discontinued after 12 weeks if the patient does not lose 5% of their body weight.  Use caution in patients on SSRIs, triptans, bupropion, and tramadol.  $120-$215 per month    DIETHYLPROPRION (Tenuate): Sympathomimetic amine.  Appetite suppressant.  Doses 25 mg before meals or 75 mg per day.  Most common side effects are hypertension, palpitations, EKG changes, and increased seizures in epileptics.  There can be a possible adverse reaction with alcohol.  $70-$90 per 3 months    XENICAL(Orlistat) ([place] OTC): Approved in 1999.  A fat-blocker.  It reduces absorption of fat by approximately 30%.  It has beneficial effects on lipid levels.  Side effects include diarrhea, abdominal cramping, fecal incontinence, oily spotting, and flatus with discharge.  Side effects are minimized if the patient limits their dietary fat to no more than 30% of their diet.  Patients must take a multivitamin daily to avoid vitamin D, E, A, and K deficiency.  $120 per month          CONTRAVE (naltrexone/buproprion): Approved  in 2014.  It is a combination pill including an opioid receptor blocker and a long-standing antidepressant.  Most common side effects include nausea, constipation, headache, vomiting, dizziness, trouble sleeping, dry mouth, and diarrhea.  With all antidepressants watch for mood changes and suicide ideation.  Bupropion has been known to lower the seizure threshold in those prone to seizures.  It should not be used in a patient with a recent history of bulimia. It has been associated with liver damage from taking higher than recommended doses.  Do not use countrave if you have taken opioid medications or opioid street drugs in the past 7-10 days, if you are currently on opioids, methadone, or if you are pregnant.  Do not use contrave if you have recently stopped using alcohol or benzodiazepines.  Taper off contrave slowly.  Dosing: titrate up to 2 tablets twice daily of the Naltrexone 8 mg/ Buproprion 90 mg tablets.  $200 for 90 tablets    SAXENDA (Liraglutide): A daily injectable (3mg daily) medication used for type 2 diabetes. Glucagon-like peptide-1 (GLP-1) agonist. Contraindications include personal or family history of medullary thyroid cancer or MEN type 2. Acute pancreatitis has been observed in patients taking liraglutide. Liraglutide causes C-cell tumors in rats and mice. It is unknown whether liraglutide causes tumors in humans. Start at 0.6mg, increasing the dose weekly up to 3mg.     VYVANSE (Lisdexamfetamine dimesylate): a CNS stimulant used to treat ADHD. Indicated for the treatment of moderate to severe Binge Eating Disorder in Adults. Contraindicated in patients with known heart disease, structural abnormalities of the heart, serious heart arrhythmias or unexplained syncope. CNS stimulants such as vyvanse may cause manic or psychotic symptoms in patients with BPAD or pre-existing psychosis. Use with caution in patients with Raynaud's phenomenon. Most common side effects include dry mouth, insomnia,  decreased appetite, increased heart rate, jittery feeling, constipation and anxiety.

## 2021-06-05 NOTE — PROGRESS NOTES
Assessment:     1. Postmenopausal bleeding  HM1(CBC and Differential)    HM1 (CBC with Diff)    US Pelvis With Transvaginal Non OB    Ambulatory referral to Gynecology   2. Malignant neoplasm of female breast, unspecified estrogen receptor status, unspecified laterality, unspecified site of breast (H)     3. Depression, unspecified depression type     4. Obesity (BMI 30.0-34.9)          Depression, stable       This practice advisory mentions breast cancer.  She follows with [facility].  Diagnosed in 2011.  Last mammogram 11/2019.    Plan:      Medications: Continue Effexor and Wellbutrin.    Patient Instructions   For post menopausal bleeding, We will do US and refer to GYN    Take all your medicine as before    Come for Lab work for cholesterol done before you se me    Continue with Weight loss program            Subjective:      Chief Complaint   Patient presents with     Follow-up     Pt here today to follow up depression- Pt states is about the same since last ov        Nubia So is a 54 y.o. female who presents for follow up of depression. Current symptoms include depressed mood, difficulty concentrating, feelings of worthlessness/guilt and psychomotor agitation. Symptoms have been stable since that time. Patient denies impaired memory, recurrent thoughts of death, suicidal thoughts with specific plan, weight gain and weight loss. Previous treatment includes: medication. She complains of the following side effects from the treatment: none.    Patient is currently taking Effexor and Wellbutrin.  She does report that relationship is now better.  She is undergoing counseling.  She is also followed with the bariatric clinic and is currently on phentermine for weight loss.    She would consider herself postmenopausal as she has not had.  Menses for more than 9 months.  Then suddenly she had a 2-week long.  In December.    The following portions of the patient's history were reviewed and updated as  appropriate: allergies, current medications, past family history, past medical history, past social history, past surgical history and problem list.  Allergies   Allergen Reactions     Sulfa (Sulfonamide Antibiotics) Rash       Current Outpatient Medications on File Prior to Visit   Medication Sig Dispense Refill     atorvastatin (LIPITOR) 20 MG tablet Take 1 tablet by mouth daily.       buPROPion (WELLBUTRIN XL) 150 MG 24 hr tablet Take 1 tablet (150 mg total) by mouth daily. 90 tablet 1     multivitamin therapeutic (THERAGRAN) tablet Take 1 tablet by mouth daily.       phentermine (ADIPEX-P) 37.5 mg tablet Take 1/2 to 1 tablet in the morning. 90 tablet 0     venlafaxine (EFFEXOR-XR) 75 MG 24 hr capsule Take 1 capsule (75 mg total) by mouth 3 (three) times a day. (Patient taking differently: Take 75 mg by mouth. ) 90 capsule 3     ipratropium (ATROVENT) 0.06 % nasal spray INSTILL 2 SPRAYS INTO EACH NOSTRIL 3 (THREE) TIMES A DAY FOR 4 DAYS.  0     ketorolac (TORADOL) 10 mg tablet Take 1 tablet (10 mg total) by mouth 3 (three) times a day for 4 days. 12 tablet 0     [DISCONTINUED] cyclobenzaprine (FLEXERIL) 5 MG tablet Take 1 tablet (5 mg total) by mouth at bedtime. 20 tablet 1     No current facility-administered medications on file prior to visit.        Patient Active Problem List   Diagnosis     Bulimia Nervosa     Urinary Tract Infection     Alcohol Abuse - Episodic     Depression With Anxiety     Hypercholesterolemia     Breast Cancer     Anxiety Disorder NOS     Alcohol abuse, in remission     Arthritis     Depression     Dyslipidemia     Knee pain     Obesity (BMI 30.0-34.9)       Past Medical History:   Diagnosis Date     Arthritis      Breast cancer (H) 2011    right     Cancer (H) 2010    Breast      Chemical dependency (H) quit 2014    Alcohol     Depression      Dyslipidemia      Hx antineoplastic chemotherapy 2011    right lumpectomy     Hx of radiation therapy 2011    right breast     Knee pain       Menstrual disorder        Past Surgical History:   Procedure Laterality Date     BREAST BIOPSY Right 2011    malignant and lumpectomy     COLONOSCOPY       DILATION AND CURETTAGE OF UTERUS       [facility] EXCISE BREAST CYST Right 2011    Description: Breast Surgery Lumpectomy;  Proc Date: 05/01/2011;       Family History   Problem Relation Age of Onset     Breast cancer Paternal Aunt         40s?     Breast cancer Cousin         mid 30s     Breast cancer Paternal Aunt         40s?     Dementia Mother 70     Depression Mother      Anxiety disorder Mother      Heart failure Father      Heart disease Father      No Medical Problems Sister      Psoriasis Son         with psoriatic arthritis       Social History     Socioeconomic History     Marital status:      Spouse name: None     Number of children: None     Years of education: None     Highest education level: None   Occupational History     None   Social Needs     Financial resource strain: None     Food insecurity:     Worry: None     Inability: None     Transportation needs:     Medical: None     Non-medical: None   Tobacco Use     Smoking status: Never Smoker     Smokeless tobacco: Never Used   Substance and Sexual Activity     Alcohol use: Yes     Alcohol/week: 0.0 - 2.0 standard drinks     Frequency: 2-4 times a month     Drinks per session: 1 or 2     Binge frequency: Never     Drug use: Never     Sexual activity: Yes     Partners: Male     Birth control/protection: Surgical, Post-menopausal     Comment:  had vasectomy   Lifestyle     Physical activity:     Days per week: None     Minutes per session: None     Stress: None   Relationships     Social connections:     Talks on phone: None     Gets together: None     Attends Protestant service: None     Active member of club or organization: None     Attends meetings of clubs or organizations: None     Relationship status: None     Intimate partner violence:     Fear of current or ex partner: None      Emotionally abused: None     Physically abused: None     Forced sexual activity: None   Other Topics Concern     None   Social History Narrative    Patient is .  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  She has 3 adult children 28, 25 and 20-year-old.  Her 20-year-old has some autistic disorder and stays with them.  Dad passed away recently from congestive heart failure.  Mom has dementia.        Royal Stewart MD  9/23/2019               Review of Systems  Constitutional: negative  Respiratory: negative  Cardiovascular: negative  Gastrointestinal: negative      Objective:      /84 (Patient Site: Left Arm, Patient Position: Sitting, Cuff Size: Adult Large)   Pulse 84   Resp 16   Wt 191 lb 6.4 oz (86.8 kg)   SpO2 98%   BMI 31.85 kg/m     General:  alert, appears stated age and cooperative   Affect & Behavior:  full facial expressions and good grooming No noted abnormal behavior     Pelvic exam: normal external genitalia, vulva, vagina, cervix, uterus and adnexa.    Little interest or pleasure in doing things: Several days  Feeling down, depressed, or hopeless: Several days  Trouble falling or staying asleep, or sleeping too much: More than half the days  Feeling tired or having little energy: Several days  Poor appetite or overeating: More than half the days  Feeling bad about yourself - or that you are a failure or have let yourself or your family down: Several days  Trouble concentrating on things, such as reading the newspaper or watching television: Not at all  Moving or speaking so slowly that other people could have noticed. Or the opposite - being so fidgety or restless that you have been moving around a lot more than usual: Several days  Thoughts that you would be better off dead, or of hurting yourself in some way: Not at all  PHQ-9 Total Score: 9  If you checked off any problems, how difficult have these problems made it for you to do your work, take care of things at home, or get along  with other people?: Somewhat difficult    Total DUSTIN 7 Score       1/27/2020             DUSTIN 7 Total Score:  5

## 2021-06-06 NOTE — TELEPHONE ENCOUNTER
Pt called in and we rescheduled her appointments with the dietitian and .    Susan Augustine RN, CBN  [facility]  P [phone]  F [phone]

## 2021-06-07 NOTE — PROGRESS NOTES
"Nubia So is a 54 y.o. female who is being evaluated via a billable telephone visit.      The patient has been notified of following:     \"This telephone visit will be conducted via a call between you and your physician/provider. We have found that certain health care needs can be provided without the need for a physical exam.  This service lets us provide the care you need with a short phone conversation.  If a prescription is necessary we can send it directly to your pharmacy.  If lab work is needed we can place an order for that and you can then stop by our lab to have the test done at a later time.    If during the course of the call the physician/provider feels a telephone visit is not appropriate, you will not be charged for this service.\"     Nubia So complains of    Chief Complaint   Patient presents with     Bariatric     MWM       I have reviewed and updated the patient's Past Medical History, Social History, Family History and Medication List.    ALLERGIES  Sulfa (sulfonamide antibiotics)      Assessment/Plan:    1. Dyslipidemia  Amb Referral to Bariatric Dietician    phentermine (ADIPEX-P) 37.5 mg tablet   2. Obesity (BMI 30.0-34.9)  Amb Referral to Bariatric Dietician   3. Hyperphagia  phentermine (ADIPEX-P) 37.5 mg tablet   4. Arthritis  phentermine (ADIPEX-P) 37.5 mg tablet   5. Depression, unspecified depression type  phentermine (ADIPEX-P) 37.5 mg tablet   6. Knee pain, unspecified chronicity, unspecified laterality  phentermine (ADIPEX-P) 37.5 mg tablet        Phone call contact time 29    Call Started at: 8:30  Call Ended at: 8:59      Signature:  Kay Bateman MD, FAAFP    Bariatric Follow-up    54 y.o.  female BMI:Body mass index is 30.95 kg/m .    Weight:   Wt Readings from Last 1 Encounters:   04/23/20 186 lb (84.4 kg)    pounds  Height: 5' 5\" (1.651 m) (4/23/2020  7:00 AM)  Initial Weight: 189 lbs (3/31/2020  8:00 AM)  Weight: 186 lb (84.4 kg) (4/23/2020  7:00 AM)  Weight loss " from initial: 11 (3/31/2020  8:00 AM)  % Weight loss: 5.82 % (3/31/2020  8:00 AM)  BMI (Calculated): 31 (4/23/2020  7:00 AM)  SpO2: 98 % (1/27/2020  6:42 PM)  Waist Circumference (In): 44 Inches (1/16/2020 10:47 AM)  Hip Circumference (In): 47 Inches (1/16/2020 10:47 AM)  Neck Circumference (In): 15 Inches (1/16/2020 10:47 AM)      Comorbidities:  Patient Active Problem List   Diagnosis     Bulimia Nervosa     Urinary Tract Infection     Alcohol Abuse - Episodic     Depression With Anxiety     Hypercholesterolemia     Breast Cancer     Anxiety Disorder NOS     Alcohol abuse, in remission     Arthritis     Depression     Dyslipidemia     Knee pain     Obesity (BMI 30.0-34.9)       Interim: off of phentermine. Feels tachyphylaxis. Has regained 8#. Working from home. Using food to cope.    Plan:  DIET  regular   EXERCISE walking daily 5,000U add 7 minute work out to break up work day and increase steps if able   PHARMACOTHERAPY phentermine (not taking) Refill and restart prn 1/2 tab daily or twice daily or 1 in th am or as needed for appetite suppression    structure meals, add 7 minute work out. Work with dietitian.      -We reviewed her medications and whether associated with weight gain.  Current Outpatient Medications on File Prior to Visit   Medication Sig Dispense Refill     atorvastatin (LIPITOR) 20 MG tablet Take 1 tablet by mouth daily.       buPROPion (WELLBUTRIN XL) 150 MG 24 hr tablet Take 1 tablet (150 mg total) by mouth daily. 90 tablet 1     multivitamin therapeutic (THERAGRAN) tablet Take 1 tablet by mouth daily.       venlafaxine (EFFEXOR-XR) 75 MG 24 hr capsule Take 2 capsules (150 mg total) by mouth daily. 180 capsule 3     ipratropium (ATROVENT) 0.06 % nasal spray INSTILL 2 SPRAYS INTO EACH NOSTRIL 3 (THREE) TIMES A DAY FOR 4 DAYS.  0     ketorolac (TORADOL) 10 mg tablet Take 1 tablet (10 mg total) by mouth 3 (three) times a day for 4 days. 12 tablet 0     [DISCONTINUED] phentermine  (ADIPEX-P) 37.5 mg tablet Take 1/2 to 1 tablet in the morning. 90 tablet 0     No current facility-administered medications on file prior to visit.         We discussed [facility] Bariatric Basics including:  -eating 3 meals daily  -eating protein first  -eating slowly, chewing food well  -avoiding/limiting calorie containing beverages  -choosing wheat, not white with breads, crackers, pastas, jennifer, bagels, tortillas, rice  -limiting restaurant or cafeteria eating to twice a week or less  -We discussed the importance of restorative sleep and stress management in maintaining a healthy weight.  -We discussed insulin resistance and glycemic index as it relates to appetite and weight control  -We discussed the National Weight Control Registry healthy weight maintenance strategies and ways to optimize metabolism.  -We discussed the importance of physical activity including cardiovascular and strength training in maintaining a healthier weight and explored viable options.    Most recent labs:  Lab Results   Component Value Date    WBC 4.7 01/27/2020    HGB 13.7 01/27/2020    HCT 39.6 01/27/2020    MCV 90 01/27/2020     01/27/2020     Lab Results   Component Value Date    CHOL 294 (H) 09/23/2019     Lab Results   Component Value Date    HDL 59 09/23/2019     Lab Results   Component Value Date    LDLCALC 198 (H) 09/23/2019     Lab Results   Component Value Date    TRIG 184 (H) 09/23/2019     Lab Results   Component Value Date    ALT 29 09/23/2019    AST 20 09/23/2019    ALKPHOS 69 09/23/2019    BILITOT 0.3 09/23/2019     Lab Results   Component Value Date    HGBA1C 5.5 01/16/2020     Lab Results   Component Value Date    NAXTHYYA49 832 (H) 01/16/2020       Lab Results   Component Value Date    FERRITIN 110 01/16/2020     Lab Results   Component Value Date    PTH 33 01/16/2020       Lab Results   Component Value Date    TSH 4.77 09/23/2019         DIETARY HISTORY  Meals Per Day: unstructured now working from  "home  Eating Protein First?: sometimes  Food Diary: B:yogurt and scrambled egg L:sandwich on jennifer low carb and carrots, bananas D:pizza  Snacks Per Day: yes-cookies  Typical Snack: cookies  Fluid Intake: intentional  Portion Control: problematic snacking, no structure with meals  Calorie Containing Beverages: no  Alcohol per week: a beer or wine 0-1 most was 4/wk  Typical Protein Food Choices: variety  Choosing Whole Grains: yes  Grocery Shopping is done by: her   Food Preparation is done by: herself  Meals at Restaurant/Cafeteria/Take Out Per Week: none  Eating at the Table: eating at her desk  TV is Off During Meals: yes    Positive Changes Since Last Visit: mental health is stable during COVID-19  Struggling With: lack of structured meals    Knowledgeable in Reading Food Labels: eys  Getting Adequate Protein: yes  Sleeping 7-8 hours/day not as much lately d/t her dog's Cushing's up at night needing to go out  Stress management walking daily    PHYSICAL ACTIVITY PATTERNS:  Cardiovascular: walking  Strength Training: contemplating-trying to buy weights online.     REVIEW OF SYSTEMS  GENERAL/CONSTITUTIONAL:  Fatigue: yes  CARDIOVASCULAR:  Chest Pain with Exertion: no  PULMONARY:  Dyspnea on exertion: no  CPAP Use: no  Tobacco Use: no  GASTROINTESTINAL:  GERD/Heartburn: no  UROLOGIC:  Urinary Symptoms: no  NEUROLOGIC:  Headaches: no  Paresthesias: no  PSYCHIATRIC:  Moods: OK  MUSCULOSKELETAL/RHEUMATOLOGIC  Arthralgias: yes  Myalgias: yes  ENDOCRINE:  Monitoring Blood Sugars: no  Sugars Well Controlled: yes  DERMATOLOGIC:  Rashes: no    PHYSICAL EXAM: (most recent vitals and today's stated weight)  Vitals: Ht 5' 5\" (1.651 m)   Wt 186 lb (84.4 kg)   BMI 30.95 kg/m    Height: 5' 5\" (1.651 m) (4/23/2020  7:00 AM)  Initial Weight: 189 lbs (3/31/2020  8:00 AM)  Weight: 186 lb (84.4 kg) (4/23/2020  7:00 AM)  Weight loss from initial: 11 (3/31/2020  8:00 AM)  % Weight loss: 5.82 % (3/31/2020  8:00 AM)  BMI " (Calculated): 31 (4/23/2020  7:00 AM)  SpO2: 98 % (1/27/2020  6:42 PM)  Waist Circumference (In): 44 Inches (1/16/2020 10:47 AM)  Hip Circumference (In): 47 Inches (1/16/2020 10:47 AM)  Neck Circumference (In): 15 Inches (1/16/2020 10:47 AM)      GEN: Pleasant and in no acute distress  PSYCH: A&OX3,     I have reviewed the note as documented above.  This accurately captures the substance of my conversation with the patient.  Thank you for the opportunity to participate in the care of your patient.    Kay Bateman MD, FAAFP  [facility]  Diplomate, American Board of Obesity Medicine

## 2021-06-07 NOTE — PROGRESS NOTES
"Nubia So is a 54 y.o. female who is being evaluated via a billable telephone visit.      The patient has been notified of following:     \"This telephone visit will be conducted via a call between you and your physician/provider. We have found that certain health care needs can be provided without the need for a physical exam.  This service lets us provide the care you need with a short phone conversation.  If a prescription is necessary we can send it directly to your pharmacy.  If lab work is needed we can place an order for that and you can then stop by our lab to have the test done at a later time.    If during the course of the call the physician/provider feels a telephone visit is not appropriate, you will not be charged for this service.\"     Nubia So complains of    Chief Complaint   Patient presents with     Nutrition Counseling       I have reviewed and updated the patient's Past Medical History, Social History, Family History and Medication List.    ALLERGIES  Sulfa (sulfonamide antibiotics)    Additional provider notes:     Medical  Weight Loss Follow-Up Diet Evaluation  Assessment:  Nubia is presenting today for a follow up weight management nutrition consultation. Pt has had an initial appointment with Dr. Bateman  Weight loss medication: Phentermine- had stopped for a little while  Pt's Initial Weight: 189 lbs  Weight: 178 lb (80.7 kg)  Weight loss from initial: 11  % Weight loss: 5.82 %    BMI: Body mass index is 29.62 kg/m .  IBW: 125-135 lbs    Estimated RMR (Murray-St Jeor equation): 1400kcal   Recommended Protein Intake:  grams of protein/day  Patient Active Problem List:  Patient Active Problem List   Diagnosis     Bulimia Nervosa     Urinary Tract Infection     Alcohol Abuse - Episodic     Depression With Anxiety     Hypercholesterolemia     Breast Cancer     Anxiety Disorder NOS     Alcohol abuse, in remission     Arthritis     Depression     Dyslipidemia     Knee pain     Obesity (BMI " 30.0-34.9)     Progress on goals from last visit: Patient reports that she is struggling with eating, especially when working from home. She states her  likes to cook and he makes really good food. She has been moving more, taking long walks when time allows.     We did not review food record today, instead discussed coping strategies for mindful eating such as eating on a smaller plate, eating in the kitchen and putting fork down between bites to eat slower.  Exercise:  Routine exercise established: Yes  Walking when able     Nutrition Diagnosis:    Overweight/Obesity (NC 3.3) related to overeating and poor lifestyle habits as evidenced by patient report of frequent snacking and grazing and BMI 29.62  Disordered Eating Pattern (NB 1.5) related to obsessive desire, intake of food exceeding RMR as evidenced by binge eating habits, frequent grazing, skipping meals    Intervention:    1. Nutrition counseling: Motivational interviewing for continued success, mindful eating strategies    Monitoring/Evaluation:    Goals:  1. Put fork down between bites  2. Eat in kitchen/dining room only    Assessment/Plan:  1. Overweight (BMI 25.0-29.9)    2. Nutritional counseling      Patient to follow up in 1 months(s) with bariatrician and 2 month(s) with RD    Phone call duration: 18 minutes    Ara Cifuentes RD

## 2021-06-09 NOTE — TELEPHONE ENCOUNTER
RN cannot approve Refill Request    RN can NOT refill this medication historical medication requested. Last office visit: 1/27/2020 Royal Stewart MD Last Physical: 9/23/2019 Last MTM visit: Visit date not found Last visit same specialty: 1/27/2020 Royal Stewart MD.  Next visit within 3 mo: Visit date not found  Next physical within 3 mo: Visit date not found      Abeba Wayne, [facility] Triage/Med Refill 7/22/2020    Requested Prescriptions   Pending Prescriptions Disp Refills     atorvastatin (LIPITOR) 20 MG tablet  0     Sig: Take 1 tablet (20 mg total) by mouth daily.       Statins Refill Protocol (Hmg CoA Reductase Inhibitors) Passed - 7/21/2020  8:54 AM        Passed - PCP or prescribing provider visit in past 12 months      Last office visit with prescriber/PCP: 1/27/2020 Royal Stewart MD OR same dept: 1/27/2020 Royal Stewart MD OR same specialty: 1/27/2020 Royal Stewart MD  Last physical: 9/23/2019 Last MTM visit: Visit date not found   Next visit within 3 mo: Visit date not found  Next physical within 3 mo: Visit date not found  Prescriber OR PCP: Royal Stewart MD  Last diagnosis associated with med order: There are no diagnoses linked to this encounter.  If protocol passes may refill for 12 months if within 3 months of last provider visit (or a total of 15 months).

## 2021-06-10 NOTE — PROGRESS NOTES
ASSESSMENT:  1. Hypercholesteremia  Patient with history of epidemiology currently on lovastatin. Due for labs today plan to continue medication at this time  - Lipid Cascade  - Comprehensive Metabolic Panel    2. Depression With Anxiety  Patient with a history of depression and anxiety. She is currently doing very well has made positive lifestyle changes. She's recently started diet plan with Weight [employer]. Will decrease her venlafaxine 75 mg daily continue with the bupropion. We discussed potential of weaning off the venlafaxine but as she is still having marital difficulties recommend that she continue at this dose right now. Will check labs today and she's having heavy bleeding will check a hemoglobin as well's a thyroid is that could be adding to her depression bleeding issues.  - buPROPion (WELLBUTRIN XL) 150 MG 24 hr tablet; TAKE ONE TABLET BY MOUTH ONE TIME DAILY  Dispense: 90 tablet; Refill: 3  - venlafaxine (EFFEXOR-XR) 75 MG 24 hr capsule; Take 1 capsule (75 mg total) by mouth daily.  - Comprehensive Metabolic Panel  - HM2(CBC w/o Differential)  - Thyroid Cascade  - T4, Free    PLAN:  There are no Patient Instructions on file for this visit.    Orders Placed This Encounter   Procedures     Lipid Cascade     Order Specific Question:   Fasting is required?     Answer:   Yes     Comprehensive Metabolic Panel     HM2(CBC w/o Differential)     Thyroid Matheny     T4, Free     Medications Discontinued During This Encounter   Medication Reason     aspirin 81 MG EC tablet Therapy completed     medroxyPROGESTERone (PROVERA) 10 MG tablet Therapy completed     tamoxifen (NOLVADEX) 20 MG tablet Therapy completed     buPROPion (WELLBUTRIN XL) 150 MG 24 hr tablet Reorder     venlafaxine (EFFEXOR-XR) 75 MG 24 hr capsule Reorder       No Follow-up on file.    CHIEF COMPLAINT:  Chief Complaint   Patient presents with     Medication Education Visit     Pt not fasting, disucss Rx- Pt self weaned down venlafaxine      Medication Education Visit     No missed doses, no noted side effects     Gynecologic Exam     With Ob/Gyn 04/19/2017 [facility]       HISTORY OF PRESENT ILLNESS:  Nubia is a 51 y.o. female presenting to the clinic today for a follow-up of her hypercholesterolemia and depression, anxiety, and menorrhagia. She is not fasting today.    Hypercholesterolemia: She is currently taking lovastatin 40 mg once per day. Her LDL cholesterol has been elevated but is improving on her current regimen.    Depression with Anxiety: She has weaned herself down to 75 mg of venlafaxine and would like to continue weaning herself off of it until she no longer needs it. She is also on Wellbutrin  mg once per day. Her depression and anxiety have been well-controlled. She notes she has difficulties falling asleep at night which contributes to her daytime fatigue.    Menorrhagia: She underwent a D&C 9 months ago. She is still experiencing menorrhagia and had a biopsy taken yesterday at her OB/GYN follow-up. She recently had a period lasting two weeks. She was recommended to undergo a total hysterectomy with oophorectomy which she is hesitant about. She is worried about entering menopause prematurely and how it might affect her mood. She is not worried about vaginal dryness. She notes her uterine lining is so thick that it disrupted her urine flow. Her latest biopsy revealed endometrial hyperplasia. Her last Pap smear was normal.    Health Maintenance: She does not need a colonoscopy until 2022. Her last colonoscopy was normal. She has gained 10 lbs in the past 9 months. She is doing Weight Watchers with her son. They attend regular in-person meetings.    REVIEW OF SYSTEMS:   She uses Atrovent nasal spray as needed on a seasonal basis. Her thyroid level was checked two years ago. All other systems are negative.    PFSH:  She has a history of breast cancer. She is currently in remission but has not been cleared by hematology and oncology.  "Her last visit was about one year ago. She has not been consuming a lot of alcohol. She is currently undergoing marriage counseling with her . She is still living with her  but they sleep in separate bedrooms. She notes he also has issues with alcohol which exacerbates their relationship issues. She feels safe at home. She would like to possibly move out but not away to get a fresh start with her . She is up to date on her immunizations.    Past Medical History:   Diagnosis Date     Breast cancer 2011    right     Cancer 2010    Breast      Chemical dependency quit 2014    Alcohol     Hx antineoplastic chemotherapy 2011    right lumpectomy     Hx of radiation therapy 2011    right breast     Family History   Problem Relation Age of Onset     Breast cancer Paternal Aunt      40s?     Breast cancer Cousin      mid 30s     Breast cancer Paternal Aunt      40s?     Past Surgical History:   Procedure Laterality Date     BREAST BIOPSY Right 2011    malignant     MA EXCISE BREAST CYST Right 2011    Description: Breast Surgery Lumpectomy;  Proc Date: 05/01/2011;     TOBACCO USE:  History   Smoking Status     Never Smoker   Smokeless Tobacco     Not on file     VITALS:  Vitals:    04/20/17 1624   BP: 122/66   Pulse: 68   Resp: 16   Weight: 172 lb 8 oz (78.2 kg)   Height: 5' 5.5\" (1.664 m)     Wt Readings from Last 3 Encounters:   04/20/17 172 lb 8 oz (78.2 kg)   07/14/16 160 lb 4 oz (72.7 kg)   12/17/15 156 lb (70.8 kg)     Body mass index is 28.27 kg/(m^2).    PHYSICAL EXAM:  General Appearance: Alert, cooperative, no distress, appears stated age  Ears: Normal TMs and external ear canals, both ears  Nose: Nares normal, septum midline, mucosa normal, no drainage  Throat: Lips, mucosa, and tongue normal; teeth and gums normal  Neck: Supple, symmetrical, trachea midline, no adenopathy;  thyroid: not enlarged, symmetric, no tenderness/mass/nodules  Lungs: Clear to auscultation bilaterally, respirations " unlabored  Heart: Regular rate and rhythm, S1 and S2 normal, no murmur, rub, or gallop  Lymph nodes: Cervical, supraclavicular, and axillary nodes normal  Psychiatric: Normal mood and affect.    ADDITIONAL HISTORY SUMMARIZED (2): Reviewed Dr. Schmitt's note from 7/14/16 regarding pre-op physical for D&C.  DECISION TO OBTAIN EXTRA INFORMATION (1): None.   RADIOLOGY TESTS (1): None.  LABS (1): Ordered labs.  MEDICINE TESTS (1): None.  INDEPENDENT REVIEW (2 each): None.    The visit lasted a total of 25 minutes face to face with the patient. Over 50% of the time was spent counseling and educating the patient about continuing medication management of her chronic conditions.    ISurya, am scribing for and in the presence of, Dr. Reynoso.    I, Dr. Reynoso, personally performed the services described in this documentation, as scribed by Surya Atkins in my presence, and it is both accurate and complete.    MEDICATIONS:  Current Outpatient Prescriptions   Medication Sig Dispense Refill     buPROPion (WELLBUTRIN XL) 150 MG 24 hr tablet TAKE ONE TABLET BY MOUTH ONE TIME DAILY 90 tablet 3     lovastatin (MEVACOR) 40 MG tablet TAKE ONE TABLET BY MOUTH ONE TIME DAILY 90 tablet 0     multivitamin therapeutic (THERAGRAN) tablet Take 1 tablet by mouth daily.       venlafaxine (EFFEXOR-XR) 75 MG 24 hr capsule Take 1 capsule (75 mg total) by mouth daily.       ipratropium (ATROVENT) 0.06 % nasal spray INSTILL 2 SPRAYS INTO EACH NOSTRIL 3 (THREE) TIMES A DAY FOR 4 DAYS.  0     No current facility-administered medications for this visit.        Total data points: 3

## 2021-06-11 NOTE — TELEPHONE ENCOUNTER
Refill Approved    Rx renewed per Medication Renewal Policy. Medication was last renewed on 3/23/20.    Abeba Wayne, [facility] Triage/Med Refill 9/3/2020     Requested Prescriptions   Pending Prescriptions Disp Refills     buPROPion (WELLBUTRIN XL) 150 MG 24 hr tablet 90 tablet 1     Sig: Take 1 tablet (150 mg total) by mouth daily.       Tricyclics/Misc Antidepressant/Antianxiety Meds Refill Protocol Passed - 8/31/2020  4:26 PM        Passed - PCP or prescribing provider visit in last year     Last office visit with prescriber/PCP: 1/27/2020 Royal Stewart MD OR same dept: 1/27/2020 Royal Stewart MD OR same specialty: 1/27/2020 Royal Stewart MD  Last physical: 9/23/2019 Last MTM visit: Visit date not found   Next visit within 3 mo: Visit date not found  Next physical within 3 mo: Visit date not found  Prescriber OR PCP: Royal Stewart MD  Last diagnosis associated with med order: 1. Depression With Anxiety  - buPROPion (WELLBUTRIN XL) 150 MG 24 hr tablet; Take 1 tablet (150 mg total) by mouth daily.  Dispense: 90 tablet; Refill: 1    If protocol passes may refill for 12 months if within 3 months of last provider visit (or a total of 15 months).

## 2021-06-15 NOTE — TELEPHONE ENCOUNTER
Please remind patient she is due for clinic visit  Meds refilled    Royal Stewart MD  2/25/2021

## 2021-06-15 NOTE — TELEPHONE ENCOUNTER
RN cannot approve Refill Request    RN can NOT refill this medication PCP messaged that patient is overdue for Office Visit. Last office visit: 1/27/2020 Royal Stewart MD Last Physical: 9/23/2019 Last MTM visit: Visit date not found Last visit same specialty: 1/27/2020 Royal Stewart MD.  Next visit within 3 mo: Visit date not found  Next physical within 3 mo: Visit date not found      Tori Gill, [facility] Triage/Med Refill 2/25/2021    Requested Prescriptions   Pending Prescriptions Disp Refills     buPROPion (WELLBUTRIN XL) 150 MG 24 hr tablet 90 tablet 1     Sig: Take 1 tablet (150 mg total) by mouth daily.       Tricyclics/Misc Antidepressant/Antianxiety Meds Refill Protocol Failed - 2/25/2021  2:18 PM        Failed - PCP or prescribing provider visit in last year     Last office visit with prescriber/PCP: 1/27/2020 Royal Stewart MD OR same dept: Visit date not found OR same specialty: 1/27/2020 Royal Stewart MD  Last physical: 9/23/2019 Last MTM visit: Visit date not found   Next visit within 3 mo: Visit date not found  Next physical within 3 mo: Visit date not found  Prescriber OR PCP: Royal Stewart MD  Last diagnosis associated with med order: 1. Depression With Anxiety  - buPROPion (WELLBUTRIN XL) 150 MG 24 hr tablet; Take 1 tablet (150 mg total) by mouth daily.  Dispense: 90 tablet; Refill: 1    If protocol passes may refill for 12 months if within 3 months of last provider visit (or a total of 15 months).

## 2021-06-16 PROBLEM — F10.11 ALCOHOL ABUSE, IN REMISSION: Status: ACTIVE | Noted: 2019-09-23

## 2021-06-16 PROBLEM — E66.811 OBESITY (BMI 30.0-34.9): Status: ACTIVE | Noted: 2020-01-30

## 2021-06-16 PROBLEM — F32.1 MODERATE MAJOR DEPRESSION (H): Status: ACTIVE | Noted: 2021-04-22

## 2021-06-16 NOTE — TELEPHONE ENCOUNTER
RN cannot approve Refill Request    RN can NOT refill this medication PCP messaged that patient is overdue for Labs and BP. Last office visit: 1/27/2020 Royal Stewart MD Last Physical: 9/23/2019 Last MTM visit: Visit date not found Last visit same specialty: 1/27/2020 Royal Stewart MD.  Next visit within 3 mo: Visit date not found  Next physical within 3 mo: Visit date not found      Tori KELLY Gill, [facility] Triage/Med Refill 3/16/2021    Requested Prescriptions   Pending Prescriptions Disp Refills     venlafaxine (EFFEXOR-XR) 75 MG 24 hr capsule 180 capsule 3     Sig: Take 2 capsules (150 mg total) by mouth daily.       Venlafaxine/Desvenlafaxine Refill Protocol Failed - 3/16/2021  2:24 PM        Failed - LFT or AST or ALT in last year     Albumin   Date Value Ref Range Status   09/23/2019 4.1 3.5 - 5.0 g/dL Final     Bilirubin, Total   Date Value Ref Range Status   09/23/2019 0.3 0.0 - 1.0 mg/dL Final     Bilirubin, Direct   Date Value Ref Range Status   04/25/2014 <0.1 <0.6 mg/dL Final     Alkaline Phosphatase   Date Value Ref Range Status   09/23/2019 69 45 - 120 U/L Final     AST   Date Value Ref Range Status   09/23/2019 20 0 - 40 U/L Final     ALT   Date Value Ref Range Status   09/23/2019 29 0 - 45 U/L Final     Protein, Total   Date Value Ref Range Status   09/23/2019 7.1 6.0 - 8.0 g/dL Final                Failed - Fasting lipid cascade in last year     Cholesterol   Date Value Ref Range Status   09/23/2019 294 (H) <=199 mg/dL Final     Triglycerides   Date Value Ref Range Status   09/23/2019 184 (H) <=149 mg/dL Final     HDL Cholesterol   Date Value Ref Range Status   09/23/2019 59 >=50 mg/dL Final     LDL Calculated   Date Value Ref Range Status   09/23/2019 198 (H) <=129 mg/dL Final     Patient Fasting > 8hrs?   Date Value Ref Range Status   09/23/2019 Yes  Final             Failed - PCP or prescribing provider visit in last year     Last office visit with prescriber/PCP:  1/27/2020 Royal Stewart MD OR same dept: Visit date not found OR same specialty: 1/27/2020 Royal Stewart MD  Last physical: 9/23/2019 Last MTM visit: Visit date not found   Next visit within 3 mo: Visit date not found  Next physical within 3 mo: Visit date not found  Prescriber OR PCP: Royal Stewart MD  Last diagnosis associated with med order: 1. Depression With Anxiety  - venlafaxine (EFFEXOR-XR) 75 MG 24 hr capsule; Take 2 capsules (150 mg total) by mouth daily.  Dispense: 180 capsule; Refill: 3    If protocol passes may refill for 12 months if within 3 months of last provider visit (or a total of 15 months).             Failed - Blood Pressure in last year     BP Readings from Last 1 Encounters:   01/27/20 116/84

## 2021-06-16 NOTE — TELEPHONE ENCOUNTER
Requested Prescriptions     Pending Prescriptions Disp Refills     venlafaxine (EFFEXOR-XR) 75 MG 24 hr capsule 180 capsule 3     Sig: Take 2 capsules (150 mg total) by mouth daily.

## 2021-06-16 NOTE — TELEPHONE ENCOUNTER
RN cannot approve Refill Request    RN can NOT refill this medication Protocol failed and NO refill given. Last office visit: 1/27/2020 Royal Stewart MD Last Physical: 9/23/2019 Last MTM visit: Visit date not found Last visit same specialty: 1/27/2020 Royal Stewart MD.  Next visit within 3 mo: Visit date not found  Next physical within 3 mo: 4/20/2021 Royal Stewart MD [facility], [facility] Triage/Med Refill 4/20/2021    Requested Prescriptions   Pending Prescriptions Disp Refills     venlafaxine (EFFEXOR-XR) 75 MG 24 hr capsule 60 capsule 0     Sig: Take 2 capsules (150 mg total) by mouth daily.       Venlafaxine/Desvenlafaxine Refill Protocol Failed - 4/20/2021  8:07 AM        Failed - LFT or AST or ALT in last year     Albumin   Date Value Ref Range Status   09/23/2019 4.1 3.5 - 5.0 g/dL Final     Bilirubin, Total   Date Value Ref Range Status   09/23/2019 0.3 0.0 - 1.0 mg/dL Final     Bilirubin, Direct   Date Value Ref Range Status   04/25/2014 <0.1 <0.6 mg/dL Final     Alkaline Phosphatase   Date Value Ref Range Status   09/23/2019 69 45 - 120 U/L Final     AST   Date Value Ref Range Status   09/23/2019 20 0 - 40 U/L Final     ALT   Date Value Ref Range Status   09/23/2019 29 0 - 45 U/L Final     Protein, Total   Date Value Ref Range Status   09/23/2019 7.1 6.0 - 8.0 g/dL Final                Failed - Fasting lipid cascade in last year     Cholesterol   Date Value Ref Range Status   09/23/2019 294 (H) <=199 mg/dL Final     Triglycerides   Date Value Ref Range Status   09/23/2019 184 (H) <=149 mg/dL Final     HDL Cholesterol   Date Value Ref Range Status   09/23/2019 59 >=50 mg/dL Final     LDL Calculated   Date Value Ref Range Status   09/23/2019 198 (H) <=129 mg/dL Final     Patient Fasting > 8hrs?   Date Value Ref Range Status   09/23/2019 Yes  Final             Failed - PCP or prescribing provider visit in last year     Last office visit with prescriber/PCP: 1/27/2020 Pat,  MD Royal OR same dept: Visit date not found OR same specialty: 1/27/2020 Royal Stewart MD  Last physical: 9/23/2019 Last MTM visit: Visit date not found   Next visit within 3 mo: Visit date not found  Next physical within 3 mo: 4/20/2021 Royal Stewart MD  Prescriber OR PCP: Royal Stewart MD  Last diagnosis associated with med order: 1. Depression With Anxiety  - venlafaxine (EFFEXOR-XR) 75 MG 24 hr capsule; Take 2 capsules (150 mg total) by mouth daily.  Dispense: 60 capsule; Refill: 0    If protocol passes may refill for 12 months if within 3 months of last provider visit (or a total of 15 months).             Failed - Blood Pressure in last year     BP Readings from Last 1 Encounters:   04/20/21 128/82

## 2021-06-16 NOTE — PROGRESS NOTES
ASSESSMENT & PLAN:  1. Routine general medical examination at a health care facility  Immunizations reviewed and updated .  Alcohol use, safety and moderation discussed.  Recommended adequate calcium intake/osteoporosis prevention.  Discussed colon cancer screening at age 50, 45 if -American.  Diet and exercise reviewed, including goal of aerobic exercise 30-90 minutes most days of the week, moderation of portions sizes, avoiding eating out and fast food and increase in fruits and vegetables.  Discussed sun protection.  Discussed weight management.    On exam patient had a mobile 2 cm cyst in the vaginal wall.  This looks like a mucous retention cyst.  Will monitor symptoms if it becomes troublesome will refer to GYN.  Pap smear was done today as well as screening labs.  Also discussed importance of weight loss through healthy living with exercise and proper diet  - Gynecologic Cytology (PAP Smear)  - Lipid Cascade; Future  - Comprehensive Metabolic Panel; Future  - HPV High Risk DNA Cervical    2. Depression With Anxiety  Patient with a history of depression and anxiety on Wellbutrin.  Plan to continue it at this time  - buPROPion (WELLBUTRIN XL) 150 MG 24 hr tablet; TAKE ONE TABLET BY MOUTH ONE TIME DAILY  Dispense: 90 tablet; Refill: 3    3. Knee pain  Patient with knee pain consistent with some degenerative changes.  No internal derangement noted.  Recommend physical therapy to build up her quad strength  - Ambulatory referral to Physical Therapy    4. Hypercholesterolemia  Patient history of elevated cholesterol due for labs today.  Plan to continue lovastatin  - Lipid Cascade; Future  - Comprehensive Metabolic Panel; Future        Orders Placed This Encounter   Procedures     Lipid Cascade     Standing Status:   Future     Standing Expiration Date:   9/3/2018     Order Specific Question:   Fasting is required?     Answer:   Yes     Comprehensive Metabolic Panel     Standing Status:   Future     Standing  Expiration Date:   9/3/2018     Ambulatory referral to Physical Therapy     Referral Priority:   Routine     Referral Type:   Physical Therapy     Referral Reason:   Evaluation and Treatment     Requested Specialty:   Physical Therapy     Number of Visits Requested:   1     Medications Discontinued During This Encounter   Medication Reason     venlafaxine (EFFEXOR-XR) 75 MG 24 hr capsule      buPROPion (WELLBUTRIN XL) 150 MG 24 hr tablet Reorder     LORazepam (ATIVAN) 1 MG tablet Reorder     LORazepam (ATIVAN) 1 MG tablet Reorder       No Follow-up on file.    I have had an Advance Directives discussion with the patient.  The following are part of a depression follow up plan for the patient:  implementation of measures to provide psychological support and patient follow-up to return when and if necessary  The following high BMI interventions were performed this visit: encouragement to exercise and lifestyle education regarding diet    CHIEF COMPLAINT:  Chief Complaint   Patient presents with     Annual Exam     52 year old female-non fasting       HISTORY OF PRESENT ILLNESS:  Nubia is a 52 y.o. female presenting to the clinic today for a physical examination. She is not fasting today.       REVIEW OF SYSTEMS:   She admits to weight gain. She runs or uses an elliptical 3-4 days a week. She has difficulty staying asleep. She is no longer taking medication for breast cancer as of one year ago. She has periods every two months. She continues to stay sober. All other systems are negative.    PFSH:  Her father has congestive heart failure.     Social History:  The patient reports that there is not domestic violence in her life.     Regular Exercise: yes  Sunscreen Use: yes  Healthy Diet: yes  Dental Visits Regularly: yes  Sexually active: yes  Colonoscopy: yes  Prevention of Osteoporosis: yes   Last DXA: yes    Social History     Social History     Marital status:      Spouse name: N/A     Number of children: N/A      Years of education: N/A     Occupational History     Not on file.     Social History Main Topics     Smoking status: Never Smoker     Smokeless tobacco: Never Used     Alcohol use Not on file     Drug use: Not on file     Sexual activity: Not on file     Other Topics Concern     Not on file     Social History Narrative       History   Smoking Status     Never Smoker   Smokeless Tobacco     Never Used       Family History:  Family History   Problem Relation Age of Onset     Breast cancer Paternal Aunt      40s?     Breast cancer Cousin      mid 30s     Breast cancer Paternal Aunt      40s?       Past Medical History:  Active Ambulatory Problems     Diagnosis Date Noted     Bulimia Nervosa      Urinary Tract Infection      Alcohol Abuse - Episodic      Depression With Anxiety      Hypercholesterolemia      Breast Cancer      Anxiety Disorder NOS      Alcohol abuse 2017     Resolved Ambulatory Problems     Diagnosis Date Noted     No Resolved Ambulatory Problems     Past Medical History:   Diagnosis Date     Breast cancer 2011     Cancer 2010     Chemical dependency quit      Hx antineoplastic chemotherapy      Hx of radiation therapy        Allergies   Allergen Reactions     Sulfa (Sulfonamide Antibiotics) Rash       Immunization History   Administered Date(s) Administered     Tdap 2012     Immunization status: up to date and documented, up to date and documented    Gynecologic History:  No LMP recorded.  Contraception:menopause  Last Pap: 12/17/15. Results were: normal  Last mammogram: 17. Results were: normal    OB History:  OB History      Para Term  AB Living    3 3 3       SAB TAB Ectopic Multiple Live Births                  Surgical History:  Past Surgical History:   Procedure Laterality Date     BREAST BIOPSY Right     malignant     ID EXCISE BREAST CYST Right     Description: Breast Surgery Lumpectomy;  Proc Date: 2011;       VITALS:  Vitals:     "03/07/18 1313   BP: 120/78   Patient Site: Right Arm   Patient Position: Sitting   Cuff Size: Adult Regular   Pulse: 94   Temp: 98.1  F (36.7  C)   TempSrc: Oral   Weight: 178 lb 11.2 oz (81.1 kg)   Height: 5' 5.16\" (1.655 m)     Wt Readings from Last 3 Encounters:   03/07/18 178 lb 11.2 oz (81.1 kg)   09/02/17 165 lb (74.8 kg)   08/20/17 158 lb (71.7 kg)     Body mass index is 29.59 kg/(m^2).      PHYSICAL EXAM:  General Appearance: Reveals an alert, pleasant female.   Vitals:  Per nursing notes.  Head: Normocephalic, without obvious abnormality, atraumatic   Eyes: PERRL, conjunctiva/corneas clear, EOM's intact   Ears: Normal TM's and external ear canals, both ears   Oropharynx: Nares normal, septum midline, mucosa normal, no drainage, lips, and tongue normal   Neck: Supple, symmetrical, trachea midline, no adenopathy; thyroid: not enlarged, symmetric, no tenderness/mass/nodules   Back: Symmetric, no curvature, ROM normal, no CVA tenderness   Lungs: Clear to auscultation bilaterally, respirations unlabored, no wheezing or rales   Heart: Regular rate and rhythm, S1 and S2 normal, no murmur, rub, or gallop, no carotid bruits  Breasts: No breast masses, tenderness, asymmetry, or nipple discharge.   Abdomen: Soft, non-tender, no masses, no organomegaly,  Pelvic: Normal-appearing female genitalia. No adnexal masses or cervical motion tenderness on bimanual exam. 2 cm cyst on right vaginal wall.   Extremities: Extremities normal, atraumatic, no cyanosis or edema   Skin: Skin color, texture, turgor normal, no rashes or lesions   Lymph nodes: Cervical, supraclavicular, and axillary nodes normal.  Neurologic: Normal   Psych: Normal affect. Does not appear anxious or depressed.     DATA REVIEWED:  Additional history summarized (from old records or history from someone other than the patient or another healthcare provider) (2 TOTAL): None.     Decision to obtain extra information (old records requested or history from another " person or accessing Care Everywhere) (1 TOTAL): None.     Radiology tests summarized or ordered (XRAY/CT/MRI/DXA) (1 TOTAL): None.    Labs reviewed or ordered (1 TOTAL): Reviewed and ordered labs.     Medicine tests summarized or ordered (EKG/ECHO) (1 TOTAL): None.    Independent review of EKG or X-Ray (2 EACH): None.       The visit lasted a total of 35 minutes face to face with the patient. Over 50% of the time was spent conducting the physical.    IAndria, am scribing for and in the presence of, Dr. Reynoso.    Thalia MENDOZA MD personally performed the services described in this documentation, as scribed by Andria Weinstein in my presence, and it is both accurate and complete.    MEDICATIONS:  Current Outpatient Prescriptions   Medication Sig Dispense Refill     buPROPion (WELLBUTRIN XL) 150 MG 24 hr tablet TAKE ONE TABLET BY MOUTH ONE TIME DAILY 90 tablet 3     LORazepam (ATIVAN) 1 MG tablet Take 1 tablet (1 mg total) by mouth 3 (three) times a day as needed for anxiety. 15 tablet 0     lovastatin (MEVACOR) 40 MG tablet TAKE 1 TABLET (40 MG TOTAL) BY MOUTH DAILY. 90 tablet 1     multivitamin therapeutic (THERAGRAN) tablet Take 1 tablet by mouth daily.       venlafaxine (EFFEXOR-XR) 75 MG 24 hr capsule TAKE 1 CAPSULE (75 MG TOTAL) BY MOUTH 3 (THREE) TIMES A DAY. 270 capsule 0     ipratropium (ATROVENT) 0.06 % nasal spray INSTILL 2 SPRAYS INTO EACH NOSTRIL 3 (THREE) TIMES A DAY FOR 4 DAYS.  0     No current facility-administered medications for this visit.        Total Data Points: 1

## 2021-06-16 NOTE — TELEPHONE ENCOUNTER
Please inform patient that she is due for med check visit.  1 month refills provided.  Royal Stewart MD  3/17/2021

## 2021-06-16 NOTE — TELEPHONE ENCOUNTER
First Attempt: I sent patient a my chart message to please call our office to schedule a med check appt.

## 2021-06-17 NOTE — PATIENT INSTRUCTIONS - HE
"Patient Instructions by Royal Stewart MD at 9/23/2019  8:10 AM     Author: Royal Stewart MD Service: -- Author Type: Physician    Filed: 9/23/2019  8:43 AM Encounter Date: 9/23/2019 Status: Signed    : Royal Stewart MD (Physician)       Patient Education     Understanding Body Mass Index (BMI)  Body mass index (BMI) is a method of screening for a weight category using the ratio of your height to your weight. The BMI is a measure of overweight that is corrected for height. Knowing your BMI is a way to tell if you are at a healthy weight. The higher your BMI, the greater your risk for weight-related health problems.  What BMI means    BMI below 18.5: Underweight    BMI 18.5 to 24.9: Healthy weight or \"ideal body weight\"     BMI 25 to 29.9: Overweight    BMI 30 and over: Obese    BMI 40 and over: Severe obesity   Online BMI Calculators  Find your BMI with an online BMI calculator tool, such as these from the CDC:    BMI calculator for adults    BMI calculator for children and teens   Using the BMI chart  To figure out your BMI, find your height and weight (or the numbers closest to them) on the table below. Follow each column of numbers to where your height and weight meet on the table. That is your BMI.    Date Last Reviewed: 7/1/2016 2000-2019 [employer]. [address] [place] [id number]. All rights reserved. This information is not intended as a substitute for professional medical care. Always follow your healthcare professional's instructions.                "

## 2021-06-17 NOTE — TELEPHONE ENCOUNTER
Please provide patient with [facility] counseling number.  A referral was already done.  Please see my chart message.    Royal Stewart MD  4/29/2021

## 2021-06-17 NOTE — TELEPHONE ENCOUNTER
Refill Approved    Rx renewed per Medication Renewal Policy. Medication was last renewed on 2/25/21, last OV 4/20/21.    Malissa Pathak, [facility] Triage/Med Refill 5/23/2021     Requested Prescriptions   Pending Prescriptions Disp Refills     buPROPion (WELLBUTRIN XL) 150 MG 24 hr tablet 90 tablet 0     Sig: Take 1 tablet (150 mg total) by mouth daily.       Tricyclics/Misc Antidepressant/Antianxiety Meds Refill Protocol Passed - 5/21/2021 11:33 AM        Passed - PCP or prescribing provider visit in last year     Last office visit with prescriber/PCP: 1/27/2020 Royal Stewart MD OR same dept: Visit date not found OR same specialty: 1/27/2020 Royal Stewart MD  Last physical: 4/20/2021 Last MTM visit: Visit date not found   Next visit within 3 mo: Visit date not found  Next physical within 3 mo: Visit date not found  Prescriber OR PCP: Royal Stewart MD  Last diagnosis associated with med order: 1. Depression With Anxiety  - buPROPion (WELLBUTRIN XL) 150 MG 24 hr tablet; Take 1 tablet (150 mg total) by mouth daily.  Dispense: 90 tablet; Refill: 0    If protocol passes may refill for 12 months if within 3 months of last provider visit (or a total of 15 months).

## 2021-06-18 NOTE — PATIENT INSTRUCTIONS - HE
Patient Instructions by Royal Stewart MD at 4/20/2021 12:50 PM     Author: Royal Stewart MD Service: -- Author Type: Physician    Filed: 4/20/2021  1:44 PM Encounter Date: 4/20/2021 Status: Addendum    : Royal Stewart MD (Physician)    Related Notes: Original Note by Royal Stewart MD (Physician) filed at 4/20/2021  1:25 PM       Patient Education     Prevention Guidelines, Women Ages 50 to 64  Screening tests and vaccines are an important part of managing your health. A screening test is done to find possible disorders or diseases in people who don't have any symptoms. The goal is to find a disease early so lifestyle changes can be made and you can be watched more closely to reduce the risk of disease, or to detect it early enough to treat it most effectively. Screening tests are not considered diagnostic, but are used to determine if more testing is needed. Health counseling is essential, too. Below are guidelines for these, for women ages 50 to 64. Talk with your healthcare provider to make sure youre up to date on what you need.  Screening Who needs it How often   Type 2 diabetes or prediabetes All women beginning at age 45 and women without symptoms at any age who are overweight or obese and have 1 or more additional risk factors for diabetes. At  least every 3 years   Type 2 diabetes or prediabetes All women diagnosed with gestational diabetes Lifelong testing every 3 years   Type 2 diabetes All women with prediabetes Every year   Alcohol misuse All women in this age group At routine exams   Blood pressure All women in this age group Yearly checkup if your blood pressure is normal  Normal blood pressure is less than 120/80 mm Hg  If your blood pressure reading is higher than normal, follow the advice of your healthcare provider   Breast cancer All women at average risk in this age group Yearly mammogram should be done until age 54. At age 55, you can switch to every other year or  choose to continue yearly.  All women should know the possible benefits and risks of breast cancer screening with mammograms.   Cervical cancer All women in this age group, except women who have had a complete hysterectomy Pap test every 3 years or Pap test with human papillomavirus (HPV) test every 5 years   Chlamydia Women at increased risk for infection At routine exams   Colorectal cancer All women at average risk in this age group Multiple tests are available and are used at different times. Possible tests include:    Flexible sigmoidoscopy every 5 years, or    Colonoscopy every 10 years, or    CT colonography (virtual colonoscopy) every 5 years, or    Yearly fecal occult blood test, or    Yearly fecal immunochemical test every year, or    Stool DNA test, every 3 years  If you choose a test other than a colonoscopy and have an abnormal test result, you will need to follow up with a colonoscopy. Screening advice varies among expert groups. Talk with your healthcare provider about which tests are best for you.  Some people should be screened using a different schedule because of their personal or family health history. Talk with your healthcare provider about your health history.   Depression All women in this age group At routine exams   Gonorrhea Sexually active women at increased risk for infection At routine exams   Hepatitis C Anyone at increased risk; 1 time for those born between 1945 and 1965 At routine exams   High cholesterol or triglycerides All women in this age group who are at risk for coronary artery disease At least every 5 years   HIV All women At routine exams   Lung cancer Adults age 55 to 80 who have smoked Yearly screening in smokers with 30 pack-year history of smoking or who quit within 15 years   Obesity All women in this age group At routine exams   Osteoporosis Women who are postmenopausal Ask your healthcare provider   Syphilis Women at increased risk for infection - talk with your  healthcare provider At routine exams   Tuberculosis Women at increased risk for infection - talk with your healthcare provider Ask your healthcare provider   Vision All women in this age group Ask your healthcare provider   Vaccine Who needs it How often   Chickenpox (varicella) All women in this age group who have no record of this infection or vaccine 2 doses; the second dose should be given at least 4 weeks after the first dose   Hepatitis A Women at increased risk for infection - talk with your healthcare provider 2 doses given at least 6 months apart   Hepatitis B Women at increased risk for infection - talk with your healthcare provider 3 doses over 6 months; second dose should be given 1 month after the first dose; the third dose should be given at least 2 months after the second dose and at least 4 months after the first dose   Haemophilus influenzae Type B (HIB) Women at increased risk for infection - talk with your healthcare provider 1 to 3 doses   Influenza (flu) All women in this age group Once a year   Measles, mumps, rubella (MMR) Women in this age group through their late 50s who have no record of these infections or vaccines 1 dose   Meningococcal Women at increased risk for infection - talk with your healthcare provider 1 or more doses   Pneumococcal conjugate vaccine (PCV13) and pneumococcal polysaccharide vaccine (PPSV23) Women at increased risk for infection - talk with your healthcare provider PCV13: 1 dose ages 19 to 65 (protects against 13 types of pneumococcal bacteria)  PPSV23: 1 to 2 doses through age 64, or 1 dose at 65 or older (protects against 23 types of pneumococcal bacteria)   Tetanus/diphtheria/pertussis (Td/Tdap) booster All women in this age group Td every 10 years, or a 1-time dose of Tdap instead of a Td booster after age 18, then Td every 10 years   Zoster All women ages 50 and older 2 dose, Check with insurance   Counseling Who needs it How often   BRCA gene mutation testing  for breast and ovarian cancer susceptibility Women with increased risk for having gene mutation When your risk is known   Breast cancer and chemoprevention Women at high risk for breast cancer When your risk is known   Diet and exercise Women who are overweight or obese When diagnosed, and then at routine exams   Sexually transmitted infection prevention Women at increased risk for infection - talk with your healthcare provider At routine exams   Use of daily aspirin Women ages 55 and up in this age group who are at risk for cardiovascular health problems such as stroke When your risk is known   Use of tobacco and the health effects it can cause All women in this age group Every exam   1 American [employer]  Date Last Reviewed: 1/26/2016 2000-2019 [employer]. [address] [place] [address], [place] [address]. All rights reserved. This information is not intended as a substitute for professional medical care. Always follow your healthcare professional's instructions.

## 2021-06-20 NOTE — LETTER
Letter by Royal Stewart MD at      Author: Royal Stewart MD Service: -- Author Type: --    Filed:  Encounter Date: 1/27/2020 Status: (Other)                    My Depression Action Plan  Name: Nubia So   Date of Birth 1965  Date: 1/27/2020    My Doctor: Royal Stewart MD   My Clinic: [facility]  [address] [facility] [address]  [phone]          GREEN    ZONE   Good Control    What it looks like:     Things are going generally well. You have normal ups and downs. You may even feel depressed from time to time, but bad moods usually last less than a day.   What you need to do:  1. Continue to care for yourself (see self care plan)  2. Check your depression survival kit and update it as needed  3. Follow your physicians recommendations including any medication.  4. Do not stop taking medication unless you consult with your physician first.           YELLOW         ZONE Getting Worse    What it looks like:     Depression is starting to interfere with your life.     It may be hard to get out of bed; you may be starting to isolate yourself from others.    Symptoms of depression are starting to last most all day and this has happened for several days.     You may have suicidal thoughts but they are not constant.   What you need to do:     1. Call your care team. Your response to treatment will improve if you keep your care team informed of your progress. Yellow periods are signs an adjustment may need to be made.     2. Continue your self-care.  Just get dressed and ready for the day.  Don't give yourself time to talk yourself out of it.    3. Talk to someone in your support network.    4. Open up your depression Depression Self-Care Plan / Wellness kit.           RED    ZONE Medical Alert - Get Help    What it looks like:     Depression is seriously interfering with your life.     You may experience these or other symptoms: You cant get out of bed most days, cant  work or engage in other necessary activities, you have trouble taking care of basic hygiene, or basic responsibilities, thoughts of suicide or death that will not go away, self-injurious behavior.     What you need to do:  1. Call your care team and request a same-day appointment. If they are not available (weekends or after hours) call your local crisis line, emergency room or 911.            Self-Care Plan / Wellness Kit    Self-Care for Depression  Heres the deal. Your body and mind are really not as separate as most people think.  What you do and think affects how you feel and how you feel influences what you do and think. This means if you do things that people who feel good do, it will help you feel better.  Sometimes this is all it takes.  There is also a place for medication and therapy depending on how severe your depression is, so be sure to consult with your medical provider and/ or Behavioral Health Consultant if your symptoms are worsening or not improving.     In order to better manage my stress, I will:    Exercise  Get some form of exercise, every day. This will help reduce pain and release endorphins, the feel good chemicals in your brain. This is almost as good as taking antidepressants!  This is not the same as joining a gym and then never going! (they count on that by the way?) It can be as simple as just going for a walk or doing some gardening, anything that will get you moving.      Hygiene   Maintain good hygiene (get out of bed in the morning, make your bed, brush your teeth, take a shower, and get dressed like you were going to work, even if you are unemployed).  If your clothes don't fit try to get ones that do.    Diet  Strive to eat foods that are good for me, drink plenty of water, and avoid excessive sugar, caffeine, alcohol, and other mood-altering substances.  Some foods that are helpful in depression are: complex carbohydrates, B vitamins, flaxseed, fish or fish oil, fresh fruits  and vegetables.    Psychotherapy  Agree to participate in Individual Therapy (if recommended).    Medication  If prescribed medications, I agree to take them.  Missing doses can result in serious side effects.  I understand that drinking alcohol, or other illicit drug use, may cause potential side effects.  I will not stop my medication abruptly without first discussing it with my provider.    Staying Connected With Others  Stay in touch with my friends, family members, and my primary care provider/team.    Use your imagination  Be creative.  We all have a creative side; it doesnt matter if its oil painting, sand castles, or mud pies! This will also kick up the endorphins.    Witness Beauty  (AKA stop and smell the roses) Take a look outside, even in mid-winter. Notice colors, textures. Watch the squirrels and birds.     Service to others  Be of service to others.  There is always someone else in need.  By helping others we can get out of ourselves and remember the really important things.  This also provides opportunities for practicing all the other parts of the program.    Humor  Laugh and be silly!  Adjust your TV habits for less news and crime-drama and more comedy.    Control your stress  Try breathing deep, massage therapy, biofeedback, and meditation. Find time to relax each day.     Crisis Text Line  http://www.crisistextline.org    The Crisis Text Line serves anyone, in any type of crisis, providing access to free, 24/7 support and information via the medium people already use and trust:    Here's how it works:  1.  Text 643-419 from anywhere in the [place], anytime, about any type of crisis.  2.  A live, trained Crisis Counselor receives the text and responds quickly.  3.  The volunteer Crisis Counselor will help you move from a 'hot moment to a cool moment'.  My support system    Clinic Contact:  Phone number:    Contact 1:  Phone number:    Contact 2:  Phone number:    Hindu/:  Phone  number:    Therapist:  Phone number:    [facility] crisis center:    Phone number:    Other community support:  Phone number:

## 2021-06-25 NOTE — TELEPHONE ENCOUNTER
RN cannot approve Refill Request    RN can NOT refill this medication Protocol failed and NO refill given. Last office visit: 4/20/2017 Thalia Reynoso MD Last Physical: 3/7/2018 Last MTM visit: Visit date not found Last visit same specialty: 4/20/2017 Thalia Reynoso MD.  Next visit within 3 mo: Visit date not found  Next physical within 3 mo: Visit date not found      Nica Sky, [facility] Triage/Med Refill 3/18/2019    Requested Prescriptions   Pending Prescriptions Disp Refills     buPROPion (WELLBUTRIN XL) 150 MG 24 hr tablet [Pharmacy Med Name: BUPROPION HCL  MG TABLET] 90 tablet 3     Sig: TAKE ONE TABLET BY MOUTH ONE TIME DAILY    Tricyclics/Misc Antidepressant/Antianxiety Meds Refill Protocol Failed - 3/15/2019  1:30 AM       Failed - PCP or prescribing provider visit in last year    Last office visit with prescriber/PCP: 4/20/2017 Thalia Reynoso MD OR same dept: Visit date not found OR same specialty: 4/20/2017 Thalia Reynoso MD  Last physical: 3/7/2018 Last MTM visit: Visit date not found   Next visit within 3 mo: Visit date not found  Next physical within 3 mo: Visit date not found  Prescriber OR PCP: Thalia Reynoso MD  Last diagnosis associated with med order: 1. Depression With Anxiety  - buPROPion (WELLBUTRIN XL) 150 MG 24 hr tablet [Pharmacy Med Name: BUPROPION HCL  MG TABLET]; TAKE ONE TABLET BY MOUTH ONE TIME DAILY  Dispense: 90 tablet; Refill: 3    If protocol passes may refill for 12 months if within 3 months of last provider visit (or a total of 15 months).

## 2021-06-28 NOTE — PROGRESS NOTES
Progress Notes by Nolan Buckley MD at 11/29/2019 10:30 AM     Author: Nolan Buckley MD Service: -- Author Type: Physician    Filed: 12/1/2019  9:26 PM Encounter Date: 11/29/2019 Status: Signed    : Nolan Buckley MD (Physician)              [facility]    Comprehensive and complex medical care - Chronic disease management - Shared decision making - Care coordination - Compassionate care    Patient advocacy - Rational deprescribing - Minimally disruptive medicine - Ethical focus - Customized care          Date of Service: 11/29/2019  Primary Provider: Royal Stewart MD    Patient Care Team:  Royal Stewart MD as PCP - General (Family Medicine)  Thalia Reynoso MD as Assigned PCP  Royal Stewart MD as Assigned PCP     ______________________________________________________________________     Patient's Pharmacy:    [id number] - [facility], [place] - [address]  [address] [place] [address] E  [facility] [address]  Phone: [phone] Fax: [phone]     Patient's Contacts:  Name Home Phone Work Phone Mobile Phone Relationship Lgl WELLINGTON Hunter   [phone] Spouse    PER PT, DECLINED    Declined      Patient's Insurance:    Payor: BLUE CROSS / Plan: BLUE CROSS OUT OF STATE / Product Type: PPO/POS/FFS /           Nubia ELIAN So is a 54 y.o. female who comes in today for:    Chief Complaint   Patient presents with   ? Shoulder Pain     LEFT SIDE NECK INTO SHOULDER PAIN, FEELS LIKE IT IS IN THE SOFT TISSUES       Active Problem List:  Problem List as of 11/29/2019 Reviewed: 9/23/2019 12:01 PM by Royal Stewart MD       Unprioritized    Alcohol Abuse - Episodic    Alcohol abuse, in remission    Anxiety Disorder NOS    Breast Cancer    Bulimia Nervosa    Depression With Anxiety    Hypercholesterolemia    Urinary Tract Infection           Current Outpatient Medications   Medication Sig Note   ? buPROPion (WELLBUTRIN XL) 150 MG 24 hr tablet Take 1  tablet (150 mg total) by mouth daily.    ? ipratropium (ATROVENT) 0.06 % nasal spray INSTILL 2 SPRAYS INTO EACH NOSTRIL 3 (THREE) TIMES A DAY FOR 4 DAYS. 9/23/2019: TAKES PRN   ? multivitamin therapeutic (THERAGRAN) tablet Take 1 tablet by mouth daily.    ? venlafaxine (EFFEXOR-XR) 75 MG 24 hr capsule Take 1 capsule (75 mg total) by mouth 3 (three) times a day.    ? cyclobenzaprine (FLEXERIL) 5 MG tablet Take 1 tablet (5 mg total) by mouth at bedtime.    ? ketorolac (TORADOL) 10 mg tablet Take 1 tablet (10 mg total) by mouth 3 (three) times a day for 4 days.      Social History     Social History Narrative    Patient is .  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  She has 3 adult children 28, 25 and 20-year-old.  Her 20-year-old has some autistic disorder and stays with them.  Dad passed away recently from congestive heart failure.  Mom has dementia.        Royal Stewart MD  9/23/2019               Subjective:     Comes in today for neck pain of over 1 week duration.     She does not have a history of joint issues or neck pain.  No known injury.  Mostly left sided without radiation, but occasionally can radiate to the left shoulder.  Not into the arm.  Usually changing position can exacerbate it.  Wakes up with the pain.  Discussed pillows with her.  No numbness in the arms.  Using advil and acetaminophen (Tylenol) without help.  No fever or chills.    We reviewed her other issues noted in the assessment but not specifically addressed in the HPI above.     On review of systems, the patient denies any other joint pain and no headaches at this time.    Objective:     Wt Readings from Last 3 Encounters:   11/29/19 194 lb (88 kg)   09/23/19 187 lb 6.4 oz (85 kg)   03/07/18 178 lb 11.2 oz (81.1 kg)     BP Readings from Last 3 Encounters:   11/29/19 128/82   09/23/19 124/77   03/07/18 120/78     /82   Pulse 99   Wt 194 lb (88 kg)   LMP 10/30/2019   SpO2 98%   BMI 32.05 kg/m     The patient is  comfortable, no acute distress.  Mood good.  Insight good.  Eyes are nonicteric.  Neck is supple without mass.  No cervical adenopathy.  No thyromegaly. Heart regular rate and rhythm.  Lungs clear to auscultation bilaterally.  Respiratory effort is good.   Extremities no edema.  She moves her neck stiffly to the left.  Her relexes are normal in the upper extremities.  Strength is intact.    Diagnostics:     Results for orders placed or performed in visit on 09/23/19   Lipid Cascade   Result Value Ref Range    Cholesterol 294 (H) <=199 mg/dL    Triglycerides 184 (H) <=149 mg/dL    HDL Cholesterol 59 >=50 mg/dL    LDL Calculated 198 (H) <=129 mg/dL    Patient Fasting > 8hrs? Yes    Comprehensive Metabolic Panel   Result Value Ref Range    Sodium 140 136 - 145 mmol/L    Potassium 4.5 3.5 - 5.0 mmol/L    Chloride 108 (H) 98 - 107 mmol/L    CO2 26 22 - 31 mmol/L    Anion Gap, Calculation 6 5 - 18 mmol/L    Glucose 103 70 - 125 mg/dL    BUN 21 8 - 22 mg/dL    Creatinine 0.81 0.60 - 1.10 mg/dL    GFR MDRD Af Amer >60 >60 mL/min/1.73m2    GFR MDRD Non Af Amer >60 >60 mL/min/1.73m2    Bilirubin, Total 0.3 0.0 - 1.0 mg/dL    Calcium 9.7 8.5 - 10.5 mg/dL    Protein, Total 7.1 6.0 - 8.0 g/dL    Albumin 4.1 3.5 - 5.0 g/dL    Alkaline Phosphatase 69 45 - 120 U/L    AST 20 0 - 40 U/L    ALT 29 0 - 45 U/L   Thyroid Cascade   Result Value Ref Range    TSH 4.77 0.30 - 5.00 uIU/mL     ______________________________________________________________________    Pertinent radiology for this visit includes the following:    Xr Cervical Spine 2 - 3 Vws    Result Date: 11/29/2019  EXAM DATE:         11/29/2019 EXAM: X-RAY CERVICAL SPINE, AP LATERAL VIEWS 3 VIEWS OR LESS LOCATION: [facility] DATE/TIME: 11/29/2019 11:30 AM INDICATION: Neck pain COMPARISON: None. IMPRESSION: Normal cervical vertebral body heights. There is slight reversal of the normal cervical lordosis at C4-C5, with 1 mm anterolisthesis of C4 on C5. Moderate  interbody degenerative change at C5-C6, with disc space height loss and marginal osteophyte formation. Additional mild right degenerative change at C4-C5. Mild to moderate degenerative facet arthropathy throughout the cervical spine, greatest at C5-C6. Normal appearance of the anterior C1-C2 articulation. No prevertebral edema. Visualized lung apices are unremarkable.     Mammo Screening Bilateral    Result Date: 11/27/2019  BILATERAL FULL FIELD DIGITAL SCREENING MAMMOGRAM Performed on: 11/27/19 Compared to: 01/31/2017 Mammo Screening Bilateral, 12/17/2015 Mammo Screening Bilateral, 05/06/2014 Mammo Screening Bilateral, and 05/03/2013 Mammo Screening Bilateral Findings: The breasts are heterogeneously dense, which may obscure small masses. There are postsurgical lumpectomy changes in the right breast. No evidence for malignancy and no change from the previous exam(s). This study was evaluated with the assistance of Computer-Aided Detection. Continue routine screening mammogram as recommended. ACR BI-RADS Category 2: Benign Findings      ______________________________________________________________________      Assessment:     1. Neck pain      ______________________________________________________________________     PHQ-2 Total Score: 2 (9/23/2019  9:00 AM)    PHQ-9 Total Score: 11 (9/23/2019  9:00 AM)    ______________________________________________________________________     BMI Readings from Last 1 Encounters:   11/29/19 32.05 kg/m        Plan:     1. Given ketorolac (Toradol) and cyclobenzaprine (Flexeril) for the neck.  2. Follow up Royal Stewart MD if not improving.  3. Note to be off work Monday given.  4. May need MRI scan if not improving.    Nolan Buckley MD  General Internal Medicine  [facility]    Return if symptoms worsen or fail to improve.     Future Appointments   Date Time Provider Department Center   1/16/2020  8:30 AM Kay Min MD [facility]  [facility]   1/16/2020 10:00 AM Liz Stephen, RD [facility]         ______________________________________________________________________     Relevant ICD-10 codes and order associations:      ICD-10-CM    1. Neck pain M54.2 ketorolac (TORADOL) 10 mg tablet     cyclobenzaprine (FLEXERIL) 5 MG tablet     XR Cervical Spine 2 - 3 VWS     XR Cervical Spine 2 - 3 VWS

## 2021-06-30 NOTE — PROGRESS NOTES
Progress Notes by Royal Stewart MD at 4/20/2021 12:50 PM     Author: Royal Stewart MD Service: -- Author Type: Physician    Filed: 4/22/2021  7:23 AM Encounter Date: 4/20/2021 Status: Signed    : Royal Stewart MD (Physician)       FEMALE PREVENTATIVE EXAM    Assessment and Plan:     1. Routine history and physical examination of adult  HM1(CBC and Differential)    Comprehensive Metabolic Panel    Hepatitis C Antibody (Anti-HCV)    HIV Antigen/Antibody Diagnostic [place]    Lipid Cascade RANDOM   2. Urinary tract infection  Urinalysis-UC if Indicated    Culture, Urine    ciprofloxacin HCl (CIPRO) 500 MG tablet   3. Obesity (BMI 30-39.9)     4. Malignant neoplasm of female breast, unspecified estrogen receptor status, unspecified laterality, unspecified site of breast (H)     5. Special screening for malignant neoplasms, colon  Ambulatory referral for Colonoscopy   6. Depression With Anxiety  AMB REFERRAL TO MENTAL HEALTH AND ADDICTION  - Adult (18+); Outpatient Treatment; Individual/Couples/Family/Group Therapy/Health Psychology; [facility]   7. Lentigo  Ambulatory referral to Dermatology   8. Hypercholesterolemia     9. Moderate major depression (H)       Medical decision making 55-year-old with history of hyperlipidemia, depression and anxiety presents today for physical exam.  Health care maintenance labs as above.  She is having some symptoms of UTI and based on UA, we started on antibiotics.  She has persistent depression which she feels is due to current strained relationship.  We discussed increasing medication versus counseling and at this time a referral for counseling is done.  For hyperlipidemia, she will continue to take her medication.  Noted various lentiginous lesions and moles and referral to dermatology is done.  She does have a history of breast cancer in remission and will follow with oncology.  Discussed dietary modification and exercise for weight  loss.        Next follow up:  Return in about 1 year (around 4/20/2022) for Annual physical, Sooner if not improving or worsening.    Immunization Review  Adult Imm Review: Advised regarding shingles vaccine.  Patient believes she had pneumonia vaccine done in the past  BMI: 30.85      I discussed the following with the patient:   Adult Healthy Living: Importance of regular exercise  Healthy nutrition  Weight loss referral options  Stress management    I have had an Advance Directives discussion with the patient.    Subjective:   Chief Complaint: Nubia So is an 55 y.o. female here for a preventative health visit.     HPI:  No episodes of Postmenopausal bleeding any further.  She does have symptoms of possible UTI.  Regarding her depression and anxiety, she feels stable.  Personal relationship with spouse is strained.  He has issues with alcohol and anger.  There is no physical abuse     Healthy Habits  Are you taking a daily aspirin? No  Do you typically exercising at least 40 min, 3-4 times per week?  NO  Do you usually eat at least 4 servings of fruit and vegetables a day, include whole grains and fiber and avoid regularly eating high fat foods? NO  Have you had an eye exam in the past two years? Yes  Do you see a dentist twice per year? Yes  Do you have any concerns regarding sleep? No    Safety Screen  If you own firearms, are they secured in a locked gun cabinet or with trigger locks? Yes  Do you feel you are safe where you are living?: Yes (4/20/2021 12:59 PM)  Do you feel you are safe in your relationship(s)?: Yes (4/20/2021 12:59 PM)      Review of Systems:  Please see above.  The rest of the review of systems are negative for all systems.     Pap History:   Last 3 PAP and HPV Results:   Cancer Screening       Status Date      MAMMOGRAM Next Due 11/27/2021      Done 11/27/2019 MAMMO SCREENING BILATERAL     Patient has more history with this topic...    PAP SMEAR Next Due 3/7/2023      Done 3/7/2018  "GYNECOLOGIC CYTOLOGY (PAP SMEAR)     Patient has more history with this topic...          Patient Care Team:  Royal Stewart MD as PCP - General (Family Medicine)  Royal Stewart MD as Assigned PCP        History     Reviewed By Date/Time Sections Reviewed    Royal Stewart MD 4/20/2021  1:36 PM Medical, Surgical, Tobacco, Family    Royal Stewart MD 4/20/2021  1:13 PM Medical, Surgical, Tobacco, Alcohol, Drug Use, Social Documentation    Dayana Quigleycia S, CMA 4/20/2021  1:03 PM Tobacco    Dann Yajaira S, CMA 4/20/2021  1:00 PM Tobacco    Dayana Quigleycia S, [facility] 4/20/2021 12:59 PM Tobacco            Objective:   Vital Signs:   Visit Vitals  /82 (Patient Site: Left Arm, Patient Position: Sitting, Cuff Size: Adult Large)   Pulse 78   Ht 5' 5\" (1.651 m)   Wt 185 lb 6.4 oz (84.1 kg)   SpO2 95%   BMI 30.85 kg/m           PHYSICAL EXAM  GENERAL: Healthy, alert and no distress  EYES: Eyes grossly normal to inspection. No discharge or erythema, or obvious scleral/conjunctival abnormalities.  NECK: no adenopathy, thyroid normal to palpation, trachea midline and normal to palpation and no carotid bruits  RESP: lungs clear to auscultation - no rales, rhonchi or wheezes  BREAST: normal without masses, tenderness or nipple discharge  ABDOMEN: soft, nontender, without hepatosplenomegaly or masses  NEURO: Cranial nerves grossly intact. Mentation and speech appropriate for age.  PSYCH: Mentation appears normal, affect normal/bright, judgement and insight intact, normal speech and appearance well-groomed    Patient is on statin for hypercholesterolemia  The 10-year ASCVD risk score (Montgomery MIKE Jr., et al., 2013) is: 2.5%    Values used to calculate the score:      Age: 55 years      Sex: Female      Is Non- : No      Diabetic: No      Tobacco smoker: No      Systolic Blood Pressure: 128 mmHg      Is BP treated: No      HDL Cholesterol: 50 mg/dL      Total Cholesterol: 226 mg/dL       "   Medication List          Accurate as of April 20, 2021 11:59 PM. If you have any questions, ask your nurse or doctor.            START taking these medications    ciprofloxacin HCl 500 MG tablet  Also known as: Cipro  INSTRUCTIONS: Take 1 tablet (500 mg total) by mouth 2 (two) times a day for 3 days.  Stop taking on: April 23, 2021  Started by: Royal Stewart MD           CONTINUE taking these medications    atorvastatin 20 MG tablet  Also known as: LIPITOR  INSTRUCTIONS: Take 1 tablet (20 mg total) by mouth daily.        buPROPion 150 MG 24 hr tablet  Also known as: WELLBUTRIN XL  INSTRUCTIONS: Take 1 tablet (150 mg total) by mouth daily.        ipratropium 42 mcg (0.06 %) nasal spray  Also known as: ATROVENT  INSTRUCTIONS: INSTILL 2 SPRAYS INTO EACH NOSTRIL 3 (THREE) TIMES A DAY FOR 4 DAYS.        multivitamin therapeutic tablet  Also known as: THERAGRAN  INSTRUCTIONS: Take 1 tablet by mouth daily.        venlafaxine 75 MG 24 hr capsule  Also known as: EFFEXOR-XR  INSTRUCTIONS: Take 2 capsules (150 mg total) by mouth daily.  Doctor's comments: Please send refill request through [facility]. If need to fax use only [phone].  Reason for med: Takes 2 tablets(total of 150 mg) daily           STOP taking these medications    ketorolac 10 mg tablet  Also known as: TORADOL  Stopped by: Royal Stewart MD     phentermine 37.5 mg tablet  Also known as: ADIPEX-P  Stopped by: Royal Stewart MD           Where to Get Your Medications      These medications were sent to [place] [id number] [place] [address] [address]    Phone: [phone]     ciprofloxacin HCl 500 MG tablet    venlafaxine 75 MG 24 hr capsule         Additional Screenings Completed Today:     Little interest or pleasure in doing things: Several days  Feeling down, depressed, or hopeless: Several days  Trouble falling or staying asleep, or sleeping too much: More than half the days  Feeling tired  or having little energy: More than half the days  Poor appetite or overeating: Nearly every day  Feeling bad about yourself - or that you are a failure or have let yourself or your family down: More than half the days  Trouble concentrating on things, such as reading the newspaper or watching television: Not at all  Moving or speaking so slowly that other people could have noticed. Or the opposite - being so fidgety or restless that you have been moving around a lot more than usual: Not at all  Thoughts that you would be better off dead, or of hurting yourself in some way: Not at all  PHQ-9 Total Score: 11  If you checked off any problems, how difficult have these problems made it for you to do your work, take care of things at home, or get along with other people?: Somewhat difficult

## 2021-07-07 ENCOUNTER — TRANSFERRED RECORDS (OUTPATIENT)
Dept: HEALTH INFORMATION MANAGEMENT | Facility: CLINIC | Age: 56
End: 2021-07-07

## 2021-07-15 ENCOUNTER — ANCILLARY PROCEDURE (OUTPATIENT)
Dept: MAMMOGRAPHY | Facility: CLINIC | Age: 56
End: 2021-07-15
Attending: FAMILY MEDICINE
Payer: COMMERCIAL

## 2021-07-15 DIAGNOSIS — Z12.31 VISIT FOR SCREENING MAMMOGRAM: ICD-10-CM

## 2021-07-15 PROCEDURE — 77063 BREAST TOMOSYNTHESIS BI: CPT

## 2021-07-21 ENCOUNTER — RECORDS - HEALTHEAST (OUTPATIENT)
Dept: ADMINISTRATIVE | Facility: CLINIC | Age: 56
End: 2021-07-21

## 2021-08-03 PROBLEM — F10.129 ALCOHOL ABUSE WITH INTOXICATION (H): Status: RESOLVED | Noted: 2019-09-23 | Resolved: 2019-09-23

## 2021-08-06 ENCOUNTER — TRANSFERRED RECORDS (OUTPATIENT)
Dept: HEALTH INFORMATION MANAGEMENT | Facility: CLINIC | Age: 56
End: 2021-08-06

## 2021-09-03 ENCOUNTER — MYC REFILL (OUTPATIENT)
Dept: FAMILY MEDICINE | Facility: CLINIC | Age: 56
End: 2021-09-03

## 2021-09-03 DIAGNOSIS — E78.00 PURE HYPERCHOLESTEROLEMIA: ICD-10-CM

## 2021-09-04 RX ORDER — ATORVASTATIN CALCIUM 20 MG/1
20 TABLET, FILM COATED ORAL DAILY
Qty: 90 TABLET | Refills: 3 | Status: SHIPPED | OUTPATIENT
Start: 2021-09-04 | End: 2021-12-03

## 2021-09-04 NOTE — TELEPHONE ENCOUNTER
"Last Written Prescription Date:  7/22/2020  Last Fill Quantity: 90,  # refills: 3   Last office visit provider:  4/20/21     Requested Prescriptions   Pending Prescriptions Disp Refills     atorvastatin (LIPITOR) 20 MG tablet 90 tablet 3     Sig: Take 1 tablet (20 mg) by mouth daily       Statins Protocol Passed - 9/3/2021 10:32 AM        Passed - LDL on file in past 12 months     Recent Labs   Lab Test 04/20/21  1350                Passed - No abnormal creatine kinase in past 12 months     No lab results found.             Passed - Recent (12 mo) or future (30 days) visit within the authorizing provider's specialty     Patient has had an office visit with the authorizing provider or a provider within the authorizing providers department within the previous 12 mos or has a future within next 30 days. See \"Patient Info\" tab in inbasket, or \"Choose Columns\" in Meds & Orders section of the refill encounter.              Passed - Medication is active on med list        Passed - Patient is age 18 or older        Passed - No active pregnancy on record        Passed - No positive pregnancy test in past 12 months             Aman Granados RN 09/04/21 3:18 PM  "

## 2021-09-12 ENCOUNTER — HEALTH MAINTENANCE LETTER (OUTPATIENT)
Age: 56
End: 2021-09-12

## 2021-10-19 PROBLEM — F32.9 MAJOR DEPRESSION: Status: ACTIVE | Noted: 2021-04-22

## 2021-11-22 ENCOUNTER — TRANSFERRED RECORDS (OUTPATIENT)
Dept: HEALTH INFORMATION MANAGEMENT | Facility: CLINIC | Age: 56
End: 2021-11-22
Payer: COMMERCIAL

## 2021-12-03 ENCOUNTER — OFFICE VISIT (OUTPATIENT)
Dept: FAMILY MEDICINE | Facility: CLINIC | Age: 56
End: 2021-12-03
Payer: COMMERCIAL

## 2021-12-03 VITALS
RESPIRATION RATE: 16 BRPM | DIASTOLIC BLOOD PRESSURE: 85 MMHG | SYSTOLIC BLOOD PRESSURE: 126 MMHG | HEART RATE: 104 BPM | WEIGHT: 192.4 LBS | BODY MASS INDEX: 32.02 KG/M2

## 2021-12-03 DIAGNOSIS — E66.811 OBESITY (BMI 30.0-34.9): ICD-10-CM

## 2021-12-03 DIAGNOSIS — M79.671 PAIN IN BOTH FEET: Primary | ICD-10-CM

## 2021-12-03 DIAGNOSIS — M25.561 PAIN IN BOTH KNEES, UNSPECIFIED CHRONICITY: ICD-10-CM

## 2021-12-03 DIAGNOSIS — E78.00 PURE HYPERCHOLESTEROLEMIA: ICD-10-CM

## 2021-12-03 DIAGNOSIS — M25.562 PAIN IN BOTH KNEES, UNSPECIFIED CHRONICITY: ICD-10-CM

## 2021-12-03 DIAGNOSIS — M79.672 PAIN IN BOTH FEET: Primary | ICD-10-CM

## 2021-12-03 PROCEDURE — 90471 IMMUNIZATION ADMIN: CPT | Performed by: FAMILY MEDICINE

## 2021-12-03 PROCEDURE — 91300 COVID-19,PF,PFIZER (12+ YRS): CPT | Performed by: FAMILY MEDICINE

## 2021-12-03 PROCEDURE — 90682 RIV4 VACC RECOMBINANT DNA IM: CPT | Performed by: FAMILY MEDICINE

## 2021-12-03 PROCEDURE — 99214 OFFICE O/P EST MOD 30 MIN: CPT | Mod: 25 | Performed by: FAMILY MEDICINE

## 2021-12-03 PROCEDURE — 0004A COVID-19,PF,PFIZER (12+ YRS): CPT | Performed by: FAMILY MEDICINE

## 2021-12-03 RX ORDER — ATORVASTATIN CALCIUM 20 MG/1
20 TABLET, FILM COATED ORAL DAILY
Qty: 90 TABLET | Refills: 3 | Status: SHIPPED | OUTPATIENT
Start: 2021-12-03 | End: 2022-12-14

## 2021-12-03 NOTE — PATIENT INSTRUCTIONS
Patient Education     Plantar Fasciitis  Plantar fasciitis is a painful swelling of the plantar fascia. The plantar fascia is a thick, fibrous layer of tissue that covers the bones on the bottom of your foot. It supports the foot bones in an arched position.  Plantar fasciitis can happen gradually or suddenly. It usually affects one foot at a time. Heel pain can be sharp, like a knife sticking into the bottom of your foot. You may feel pain after exercising, long-distance jogging, stair climbing, long periods of standing, or after standing up.  Risk factors include: non-active lifestyle, arthritis, diabetes, obesity or recent weight gain, flat foot, high arch. Wearing high heels, loose shoes, or shoes with poor arch support for long periods of time adds to the risk. This problem is commonly found in runners and dancers. It also found in people who stand on hard surfaces for long periods of time.  Foot pain from this condition is usually worse in the morning. But it often improves with walking. By the end of the day there may be a dull aching. Treatment requires short-term rest and controlling swelling. It may take up to 9 months before all symptoms go away. Rarely, a steroid injection into the foot, or surgery, may be needed.  Home care    If you are overweight, lose weight to help healing.    Choose supportive shoes with good arch support and shock absorbency. Replace athletic shoes when they become worn out. Don t walk or run barefoot.    Premade or custom-fitted shoe inserts may be helpful. Inserts made of silicone seem to be the most effective. Custom-made inserts can be provided by foot specialist, physical therapist, or orthopedist.    Premade or custom-made night splints keep the heel stretched out while you sleep. They may prevent morning pain.    Limit activities that stress the feet: jogging, prolonged standing or walking, contact sports, etc.    First thing in the morning and before sports, stretch the  bottom of your feet. Gently flex your ankle so the toes move toward your knee.    Icing may help control heel pain. Apply an ice pack to the heel for 10 to 20 minutes as a preventive. Or ice your heel after a severe flare-up of symptoms. You may repeat this every 1 to 2 hours as needed.    You may use over-the-counter pain medicine to control pain, unless another medicine was prescribed. Anti-inflammatory pain medicines, such as ibuprofen or naproxen, may work better than acetaminophen. If you have chronic liver or kidney disease or ever had a stomach ulcer or gastrointestinal bleeding, talk with your healthcare provider before using these medicines.  Follow-up care  Follow up with your healthcare provider, or as advised.  Call for an appointment if pain worsens or there is no relief after a few weeks of home treatment. Shoe inserts, a night splint, or a special boot may be required.  If X-rays were taken, you will be told of any new findings that may affect your care.  When to seek medical advice  Call your healthcare provider right away if any of these occur:    Foot swelling    Redness or warmth with increasing pain  Milly last reviewed this educational content on 5/1/2018 2000-2021 [employer]. All rights reserved. This information is not intended as a substitute for professional medical care. Always follow your healthcare professional's instructions.

## 2021-12-03 NOTE — PROGRESS NOTES
"  Assessment & Plan     ICD-10-CM    1. Pain in both feet  M79.671 XR Foot Bilateral G/E 3 Views    M79.672 Vitamin D Deficiency     Parathyroid Hormone Intact   2. Pure hypercholesterolemia  E78.00 atorvastatin (LIPITOR) 20 MG tablet   3. Pain in both knees, unspecified chronicity  M25.561     M25.562    4. Obesity (BMI 30.0-34.9)  E66.9      Medical decision making: Patient with bilateral feet pain presents today.  This is also causing some pain in the knee with exercise.  Exam does show tenderness diffuse.  We will start with checking an x-ray today to evaluate for arthritis.  Plantar fasciitis might be causing and printed education which is provided.  Check labs for vitamin D and PTH.  If labs are normal and x-ray is normal, physical therapy is recommended and I will do a referral.  Knee pain can be secondary to the weight pain and this is discussed with the patient.  Discussed her weight and advise dietary modification.  Patient is agreeable.       BMI:   Estimated body mass index is 32.02 kg/m  as calculated from the following:    Height as of 4/20/21: 1.651 m (5' 5\").    Weight as of this encounter: 87.3 kg (192 lb 6.4 oz).   Weight management plan: Discussed healthy diet and exercise guidelines    MEDICATIONS:  Continue current medications without change  Work on weight loss  See Patient Instructions    Return in about 4 months (around 4/3/2022) for Routine preventive.    Royal Stewart MD  [facility]    Subjective    Chief Complaint   Patient presents with     Knee Pain     Musculoskeletal Problem     top of foot and bottom of left foot pain        Nubia is a 56 year old who presents for the following health issues   She denies any injuries.  Lunges and exercise make knees also painful.  Tried Optavia diet and caused bloating    Knee Pain    Musculoskeletal Problem    History of Present Illness       She eats 0-1 servings of fruits and vegetables daily.She consumes 0 " sweetened beverage(s) daily.She exercises with enough effort to increase her heart rate 20 to 29 minutes per day.  She exercises with enough effort to increase her heart rate 3 or less days per week.   She is taking medications regularly.     Patient Active Problem List   Diagnosis     Bulimia Nervosa     Urinary Tract Infection     Alcohol Abuse - Episodic     Depression With Anxiety     Hypercholesterolemia     Breast Cancer     Anxiety Disorder NOS     Alcohol abuse, in remission     Arthritis     Depression     Dyslipidemia     Knee pain     Obesity (BMI 30.0-34.9)     Moderate major depression (H)     Current Outpatient Medications   Medication     atorvastatin (LIPITOR) 20 MG tablet     buPROPion (WELLBUTRIN XL) 150 MG 24 hr tablet     multivitamin therapeutic (THERAGRAN) tablet     venlafaxine (EFFEXOR-XR) 75 MG 24 hr capsule     No current facility-administered medications for this visit.       Review of Systems   Constitutional, HEENT, cardiovascular, pulmonary, gi and gu systems are negative, except as otherwise noted.      Objective    /85 (BP Location: Right arm, Patient Position: Sitting, Cuff Size: Adult Regular)   Pulse 104   Resp 16   Wt 87.3 kg (192 lb 6.4 oz)   BMI 32.02 kg/m    Body mass index is 32.02 kg/m .  Physical Exam   GENERAL: healthy, alert and no distress  MS: Examination of bilateral knees does not show any obvious deformity.  Anterior and posterior drawers, Lachman and Hyacinth test are all normal/negative.  No swelling of legs.  Examination of bilateral feet does not show any deformity.  Diffuse tenderness bilaterally on feet on the dorsum and on plantar fascial insertion.      Office Visit - [facility] on 04/20/2021   Component Date Value Ref Range Status     Color Urine 04/20/2021 Yellow  Colorless, Yellow, Straw, Light Yellow Final     Appearance Urine 04/20/2021 Clear  Clear Final     Glucose Urine 04/20/2021 Negative  Negative Final     Protein Albumin Urine 04/20/2021  Trace* Negative Final     Bilirubin Urine 04/20/2021 Negative  Negative Final     Urobilinogen Urine 04/20/2021 0.2 E.U./dL  0.2 E.U./dL, 1.0 E.U./dL Final     pH Urine 04/20/2021 7.5  5.0 - 8.0 Final     Blood Urine 04/20/2021 Negative  Negative Final     Ketones Urine 04/20/2021 Trace* Negative Final     Nitrite Urine 04/20/2021 Negative  Negative Final     Leukocyte Esterase Urine 04/20/2021 Small* Negative Final     Specific Gravity Urine 04/20/2021 1.020  1.005 - 1.030 Final     RBC Urine 04/20/2021 0-2  None Seen, 0-2 hpf Final     WBC Urine 04/20/2021 5-10* None Seen, 0-5 hpf Final     Bacteria Urine 04/20/2021 None Seen  None Seen Final     Squamous Epithelials Urine 04/20/2021 10-25* None Seen, 0-5 lpf Final     Sodium 04/20/2021 136  136 - 145 mmol/L Final     Potassium 04/20/2021 3.9  3.5 - 5.0 mmol/L Final     Chloride 04/20/2021 102  98 - 107 mmol/L Final     Carbon Dioxide (CO2) 04/20/2021 22  22 - 31 mmol/L Final     Anion Gap 04/20/2021 12  5 - 18 mmol/L Final     Glucose 04/20/2021 96  70 - 125 mg/dL Final     Urea Nitrogen 04/20/2021 21  8 - 22 mg/dL Final     Creatinine 04/20/2021 1.06  0.60 - 1.10 mg/dL Final     GFR Estimate If Black 04/20/2021 >60  >60 mL/min/1.73m2 Final     GFR Estimate 04/20/2021 54* >60 mL/min/1.73m2 Final     Bilirubin Total 04/20/2021 0.2  0.0 - 1.0 mg/dL Final     Calcium 04/20/2021 8.7  8.5 - 10.5 mg/dL Final     Protein Total 04/20/2021 6.9  6.0 - 8.0 g/dL Final     Albumin 04/20/2021 4.1  3.5 - 5.0 g/dL Final     Alkaline Phosphatase 04/20/2021 83  45 - 120 U/L Final     AST 04/20/2021 17  0 - 40 U/L Final     ALT 04/20/2021 23  0 - 45 U/L Final     WBC 04/20/2021 4.8  4.0 - 11.0 thou/uL Final     RBC Count 04/20/2021 4.59  3.80 - 5.40 mill/uL Final     Hemoglobin 04/20/2021 13.8  12.0 - 16.0 g/dL Final     Hematocrit 04/20/2021 40.9  35.0 - 47.0 % Final     MCV 04/20/2021 89  80 - 100 fL Final     MCH 04/20/2021 30.1  27.0 - 34.0 pg Final     MCHC 04/20/2021 33.7   32.0 - 36.0 g/dL Final     RDW 04/20/2021 13.4  11.0 - 14.5 % Final     Platelet Count 04/20/2021 273  140 - 440 thou/uL Final     Mean Platelet Volume 04/20/2021 8.2  7.0 - 10.0 fL Final     % Neutrophils 04/20/2021 42* 50 - 70 % Final     % Lymphocytes 04/20/2021 42* 20 - 40 % Final     % Monocytes 04/20/2021 11* 2 - 10 % Final     % Eosinophils 04/20/2021 4  0 - 6 % Final     % Basophils 04/20/2021 1  0 - 2 % Final     % Immature Granulocytes 04/20/2021 0  <=0 % Final     Absolute Neutrophils 04/20/2021 2.0  2.0 - 7.7 thou/uL Final     Absolute Lymphocytes 04/20/2021 2.0  0.8 - 4.4 thou/uL Final     Absolute Monocytes 04/20/2021 0.5  0.0 - 0.9 thou/uL Final     Eosinophils Absolute 04/20/2021 0.2  0.0 - 0.4 thou/uL Final     Absolute Basophils 04/20/2021 0.1  0.0 - 0.2 thou/uL Final     Absolute Immature Granulocytes 04/20/2021 0.0  <=0.0 thou/uL Final     Culture 04/20/2021 No Growth   Final     Cholesterol 04/20/2021 226* <=199 mg/dL Final     Triglycerides 04/20/2021 266* <=149 mg/dL Final     Direct Measure HDL 04/20/2021 50  >=50 mg/dL Final     LDL Cholesterol Calculated 04/20/2021 123  <=129 mg/dL Final     Patient Fasting > 8hrs? 04/20/2021 No   Final     Hepatitis C Antibody 04/20/2021 Negative  Negative Final     HIV Antigen Antibody Combo 04/20/2021 Negative  Negative Final

## 2021-12-06 DIAGNOSIS — M25.579 PAIN IN JOINT, ANKLE AND FOOT, UNSPECIFIED LATERALITY: Primary | ICD-10-CM

## 2022-01-18 NOTE — TELEPHONE ENCOUNTER
Received fax from pharmacy requesting refill on bupropion.     [Previously active] : previously active [Patient would like to be screened for STIs] : Patient would like to be screened for STIs [FreeTextEntry1] : SHAJI SOLIS is a 23 year old who presents for annual wellness visit. Pt c/o irritation despite the Metronidazole  that was prescribed to her due to her bacterial vaginosis. Pt has an IUD. Pt is sexually active. \par \par Pt c/o a lump on her breast. \par \par Pt reported that she can be contacted through text message.

## 2022-01-27 ENCOUNTER — HOSPITAL ENCOUNTER (OUTPATIENT)
Dept: PHYSICAL THERAPY | Facility: REHABILITATION | Age: 57
End: 2022-01-27
Payer: COMMERCIAL

## 2022-01-27 DIAGNOSIS — M25.579 PAIN IN JOINT, ANKLE AND FOOT, UNSPECIFIED LATERALITY: Primary | ICD-10-CM

## 2022-01-27 PROCEDURE — 97161 PT EVAL LOW COMPLEX 20 MIN: CPT | Mod: GP | Performed by: PHYSICAL THERAPIST

## 2022-01-27 PROCEDURE — 97110 THERAPEUTIC EXERCISES: CPT | Mod: GP | Performed by: PHYSICAL THERAPIST

## 2022-01-27 NOTE — PROGRESS NOTES
01/27/22 1500   General Information   Type of Visit Initial OP Ortho PT Evaluation   Start of Care Date 01/27/22   Referring Physician Royal Stewart MD   Orders Evaluate and Treat   Certification Required? No   Medical Diagnosis Pain in joint, ankle and foot, unspecified laterality   Surgical/Medical history reviewed Yes   Precautions/Limitations   (h/o breast cancer)   Body Part(s)   Body Part(s) Ankle/Foot;Knee   Presentation and Etiology   Pertinent history of current problem (include personal factors and/or comorbidities that impact the POC) Patient started having knee pain about 2 years ago bilaterally, worse dependent on activity. She had been doing regular exercise and running prior to this, but slowed down with COVID. Pain in the knees is anterior and bilateral, worse with walking, squatting, lunges, stairs, and stopping quickly with walking her dog. Pain is better with relative rest. No treatment for the knees at this point, but would like to return to running and exercising. She reports she started having foot pain on the left, primarily on the bottom, but at times over the top of the foot, about 6 months ago. She has trouble if barefoot and not wearing good shoes. Worse in the morning and evening. At times can have tingling/burning/numbness. She has trouble with walking and running, prolonged standing due to this pain. No treatment for her feet at this time.   Fall Risk Screen   Fall screen completed by PT   Have you fallen 2 or more times in the past year? No   Have you fallen and had an injury in the past year? No   Is patient a fall risk? No   Abuse Screen (yes response referral indicated)   Feels Unsafe at Home or Work/School no   Feels Threatened by Someone no   Does Anyone Try to Keep You From Having Contact with Others or Doing Things Outside Your Home? no   Physical Signs of Abuse Present no   System Outcome Measures   Outcome Measures   (LEFS 59/80)   Ankle/Foot Objective Findings   Side  (if bilateral, select both right and left) Left;Right   Posture   (mild pes planus, minimal arch collapse with SLS)   Gait/Locomotion normal   Foot Position In Standing mild pes planus, minimal arch collapse with SLS   Palpation tenderness plantar fascia L>R   Right DF (Knee Ext) AROM 15   Right PF AROM 50   Right Calcanceal Inversion AROM 50   Right Calcaneal Eversion AROM 25   Right Great Toe Flexion AROM 50   Right DF/Inversion Strength 5   Right DF/Eversion Strength 5   Right PF/Inversion Strength 5   Right PF/Eversion Strength 5   Right PF Strength 5   Left DF (Knee Ext) AROM 15   Left PF AROM 50   Left Calcanceal Inversion AROM 50   Left Calcaneal Eversion AROM 25   Left Great Toe Flexion AROM 50   Left DF/Inversion Strength 5   Left DF/Eversion Strength 5   Left PF/Inversion Strength 5   Left PF/Eversion Strength 5   Left PF Strength 5   Knee Objective Findings   Side (if bilateral, select both right and left) Right;Left   Gait/Locomotion normal   Step Test Height Control Comment dynamic valgus bilaterally   Anterior Drawer Test - bilat   Varus Stress Test - bialt   Valgus Stress Test -bilat   Hyacinth's Test - bilat   Palpation tenderness to quad and quad tendon, non-tender throughout knee joint otherwise   Accessory Motion/Joint Mobility mild hypomobility to patella, with quad set slight lateral set   Right Knee Extension PROM 0   Right Knee Flexion PROM 135   Right Knee Flexion Strength 5   Right Knee Extension Strength 5 pain   Right Hip Abduction Strength 5   Right Hamstring Flexibility 80   Right Quadricep Flexibility mild limitation prone   Left Knee Extension PROM 0   Left Knee Flexion PROM 135   Left Knee Flexion Strength 5   Left Knee Extension Strength 5 pain   Left Hip Abduction Strength 4   Left Hamstring Flexibility 80   Left Quadricep Flexibility mild limitation prone   Planned Therapy Interventions   Planned Therapy Interventions ROM;strengthening;stretching;neuromuscular re-education;manual  therapy;joint mobilization;gait training   Clinical Impression   Criteria for Skilled Therapeutic Interventions Met yes, treatment indicated   PT Diagnosis anterior knee pain/patellofemoral syndrome, plantar fasciitis   Clinical Presentation Stable/Uncomplicated   Clinical Decision Making (Complexity) Low complexity   Therapy Frequency 2 times/Week   Predicted Duration of Therapy Intervention (days/wks) 10   Risk & Benefits of therapy have been explained Yes   Patient, Family & other staff in agreement with plan of care Yes   Clinical Impression Comments Patient is a 56 year old female seen in PT for initial evaluation of bilateral knee pain X 2 years, left foot pain X 6 months. The patient is having difficulty with walking, running, exercising, prolonged standing, stairs, 2/2 pain. With examination, the patient demonstrates mild quad and hamstring tightness, weakness in left hip abductions with painful quad strength testing, patellar hypomobility with lateral deviation on quad set, and great toe extension ROM loss, with tenderness to plantar fascia palpation. The patients pain is consistent with patellofemoral syndrome, and plantar fasciitis. She will likely benefit from skilled PT to improve her mobility, strength, pain, and overall function.   Education Assessment   Preferred Learning Style Listening;Reading;Demonstration;Pictures/video   Barriers to Learning No barriers   ORTHO GOALS   PT Ortho Eval Goals 1;2;3;4   Ortho Goal 1   Goal Identifier Walking   Goal Description Patient will be able to walk > 2 miles, no increase in knee or foot pain in 6 weeks:   Target Date 03/10/22   Ortho Goal 2   Goal Identifier Running   Goal Description Patient will be able to walk/jog for 1 mile, no increase in knee or foot pain, in 12 weeks:   Target Date 04/21/22   Ortho Goal 3   Goal Identifier Stairs   Goal Description Patient will be able to ascend/descend stairs without increase in pain in 6 weeks:   Target Date 03/10/22    Ortho Goal 4   Goal Identifier LEFS   Goal Description Patient will improve LEFS by > 9  points for MDIC in function in 6 weeks:   Target Date 03/10/22   Total Evaluation Time   PT Eval, Low Complexity Minutes (48003) 30     Darrick Cao, PT

## 2022-02-08 ENCOUNTER — HOSPITAL ENCOUNTER (OUTPATIENT)
Dept: PHYSICAL THERAPY | Facility: REHABILITATION | Age: 57
End: 2022-02-08
Payer: COMMERCIAL

## 2022-02-08 DIAGNOSIS — M25.579 PAIN IN JOINT, ANKLE AND FOOT, UNSPECIFIED LATERALITY: Primary | ICD-10-CM

## 2022-02-08 PROCEDURE — 97110 THERAPEUTIC EXERCISES: CPT | Mod: GP | Performed by: PHYSICAL THERAPIST

## 2022-02-08 PROCEDURE — 97140 MANUAL THERAPY 1/> REGIONS: CPT | Mod: GP | Performed by: PHYSICAL THERAPIST

## 2022-02-22 ENCOUNTER — HOSPITAL ENCOUNTER (OUTPATIENT)
Dept: PHYSICAL THERAPY | Facility: REHABILITATION | Age: 57
End: 2022-02-22
Payer: COMMERCIAL

## 2022-02-22 DIAGNOSIS — M25.579 PAIN IN JOINT, ANKLE AND FOOT, UNSPECIFIED LATERALITY: Primary | ICD-10-CM

## 2022-02-22 PROCEDURE — 97110 THERAPEUTIC EXERCISES: CPT | Mod: GP | Performed by: PHYSICAL THERAPIST

## 2022-02-22 PROCEDURE — 97140 MANUAL THERAPY 1/> REGIONS: CPT | Mod: GP | Performed by: PHYSICAL THERAPIST

## 2022-03-08 ENCOUNTER — HOSPITAL ENCOUNTER (OUTPATIENT)
Dept: PHYSICAL THERAPY | Facility: REHABILITATION | Age: 57
End: 2022-03-08
Payer: COMMERCIAL

## 2022-03-08 DIAGNOSIS — M25.579 PAIN IN JOINT, ANKLE AND FOOT, UNSPECIFIED LATERALITY: Primary | ICD-10-CM

## 2022-03-08 PROCEDURE — 97110 THERAPEUTIC EXERCISES: CPT | Mod: GP | Performed by: PHYSICAL THERAPIST

## 2022-03-08 PROCEDURE — 97140 MANUAL THERAPY 1/> REGIONS: CPT | Mod: GP | Performed by: PHYSICAL THERAPIST

## 2022-04-08 NOTE — PROGRESS NOTES
[facility]    Outpatient Physical Therapy Discharge Note  Patient: Nubia So  : 1965    Beginning/End Dates of Reporting Period:  22 to 3/8/22    Referring Provider: Royal Stewart MD    Therapy Diagnosis: anterior knee pain/patellofemoral syndrome, plantar fasciitis     Client Self Report: When doing exercises feet get sore, but then get better. Knees are sore, tried to do a 20 minute workout on TV and it is just a lot of squats. Tops of feet hurt at night.    Objective Measurements:  Objective Measure: LEFS 59 initial     Objective Measure: Palpation  Details: Tenderness plantar facsia bilaterally  Objective Measure: Great toe extension ROM  Details:     Goals:  Goal Identifier Walking   Goal Description Patient will be able to walk > 2 miles, no increase in knee or foot pain in 6 weeks:   Target Date 03/10/22   Date Met      Progress (detail required for progress note): Has not started doing too much with walking     Goal Identifier Running   Goal Description Patient will be able to walk/jog for 1 mile, no increase in knee or foot pain, in 12 weeks:   Target Date 22   Date Met      Progress (detail required for progress note): Has not started doing too much with walking     Goal Identifier Stairs   Goal Description Patient will be able to ascend/descend stairs without increase in pain in 6 weeks:   Target Date 03/10/22   Date Met      Progress (detail required for progress note): Improved     Goal Identifier LEFS   Goal Description Patient will improve LEFS by > 9  points for MDIC in function in 6 weeks:   Target Date 03/10/22   Date Met      Progress (detail required for progress note):   Met     Plan:  Discharge from therapy.    Discharge:    Reason for Discharge: Patient has met all goals.    Equipment Issued: NA    Discharge Plan: Patient to continue home program.    Darrick  LORI Cao, PT

## 2022-04-08 NOTE — ADDENDUM NOTE
Encounter addended by: Darrick Cao, PT on: 4/8/2022 10:34 AM   Actions taken: Episode resolved, Clinical Note Signed

## 2022-05-09 DIAGNOSIS — F34.1 DYSTHYMIC DISORDER: ICD-10-CM

## 2022-05-09 NOTE — TELEPHONE ENCOUNTER
Pending Prescriptions:                       Disp   Refills    venlafaxine (EFFEXOR XR) 75 MG 24 hr caps*180 ca*3            Sig: Take 2 capsules (150 mg) by mouth daily

## 2022-05-12 RX ORDER — VENLAFAXINE HYDROCHLORIDE 75 MG/1
150 CAPSULE, EXTENDED RELEASE ORAL DAILY
Qty: 180 CAPSULE | Refills: 3 | Status: SHIPPED | OUTPATIENT
Start: 2022-05-12 | End: 2023-02-10

## 2022-06-08 DIAGNOSIS — F34.1 DYSTHYMIC DISORDER: ICD-10-CM

## 2022-06-09 RX ORDER — BUPROPION HYDROCHLORIDE 150 MG/1
150 TABLET ORAL DAILY
Qty: 90 TABLET | Refills: 3 | Status: SHIPPED | OUTPATIENT
Start: 2022-06-09 | End: 2023-02-10

## 2022-06-09 NOTE — TELEPHONE ENCOUNTER
"Routing refill request to provider for review/approval because:  PHQ 9 score - not on file/out of date.    Last Written Prescription Date:  5/23/21  Last Fill Quantity: 90,  # refills: 3   Last office visit provider:  12/3/21     Requested Prescriptions   Pending Prescriptions Disp Refills     buPROPion (WELLBUTRIN XL) 150 MG 24 hr tablet 90 tablet 3     Sig: Take 1 tablet (150 mg) by mouth daily       SSRIs Protocol Failed - 6/9/2022  2:25 PM        Failed - PHQ-9 score less than 5 in past 6 months     Please review last PHQ-9 score.           Failed - Recent (6 mo) or future (30 days) visit within the authorizing provider's specialty     Patient had office visit in the last 6 months or has a visit in the next 30 days with authorizing provider or within the authorizing provider's specialty.  See \"Patient Info\" tab in inbasket, or \"Choose Columns\" in Meds & Orders section of the refill encounter.            Passed - Medication is Bupropion     If the medication is Bupropion (Wellbutrin), and the patient is taking for smoking cessation; OK to refill.          Passed - Medication is active on med list        Passed - Patient is age 18 or older        Passed - No active pregnancy on record        Passed - No positive pregnancy test in last 12 months             Bryan Velasquez RN 06/09/22 2:25 PM  "

## 2022-06-19 ENCOUNTER — HEALTH MAINTENANCE LETTER (OUTPATIENT)
Age: 57
End: 2022-06-19

## 2022-11-19 ENCOUNTER — HEALTH MAINTENANCE LETTER (OUTPATIENT)
Age: 57
End: 2022-11-19

## 2022-12-13 DIAGNOSIS — E78.00 PURE HYPERCHOLESTEROLEMIA: ICD-10-CM

## 2022-12-14 NOTE — TELEPHONE ENCOUNTER
"Routing refill request to provider for review/approval because:  Labs not current:  LDL  Patient needs to be seen because it has been more than 1 year since last office visit.    Last Written Prescription Date:  12/3/21  Last Fill Quantity: 90,  # refills: 3   Last office visit provider:  12/3/21     Requested Prescriptions   Pending Prescriptions Disp Refills     atorvastatin (LIPITOR) 20 MG tablet 90 tablet 3     Sig: Take 1 tablet (20 mg) by mouth daily       Statins Protocol Failed - 12/14/2022  3:29 PM        Failed - LDL on file in past 12 months     Recent Labs   Lab Test 04/20/21  1350                Failed - Recent (12 mo) or future (30 days) visit within the authorizing provider's specialty     Patient has had an office visit with the authorizing provider or a provider within the authorizing providers department within the previous 12 mos or has a future within next 30 days. See \"Patient Info\" tab in inbasket, or \"Choose Columns\" in Meds & Orders section of the refill encounter.              Passed - No abnormal creatine kinase in past 12 months     No lab results found.             Passed - Medication is active on med list        Passed - Patient is age 18 or older        Passed - No active pregnancy on record        Passed - No positive pregnancy test in past 12 months             Bryan Velasquez RN 12/14/22 3:30 PM  "

## 2022-12-15 RX ORDER — ATORVASTATIN CALCIUM 20 MG/1
20 TABLET, FILM COATED ORAL DAILY
Qty: 90 TABLET | Refills: 0 | Status: SHIPPED | OUTPATIENT
Start: 2022-12-15 | End: 2023-02-10

## 2022-12-15 NOTE — TELEPHONE ENCOUNTER
Called and LVM for patient to call us back to get scheduled for a visit with her provider for further refills.

## 2022-12-29 NOTE — TELEPHONE ENCOUNTER
Second Attempt: I sent a my chart message to the patient to please contact our office to schedule a med check appt.

## 2023-01-03 ENCOUNTER — E-VISIT (OUTPATIENT)
Dept: URGENT CARE | Facility: CLINIC | Age: 58
End: 2023-01-03
Payer: COMMERCIAL

## 2023-01-03 DIAGNOSIS — N39.0 ACUTE UTI (URINARY TRACT INFECTION): Primary | ICD-10-CM

## 2023-01-03 PROCEDURE — 99421 OL DIG E/M SVC 5-10 MIN: CPT | Performed by: PHYSICIAN ASSISTANT

## 2023-01-03 RX ORDER — NITROFURANTOIN 25; 75 MG/1; MG/1
100 CAPSULE ORAL 2 TIMES DAILY
Qty: 10 CAPSULE | Refills: 0 | Status: SHIPPED | OUTPATIENT
Start: 2023-01-03 | End: 2023-01-08

## 2023-01-03 NOTE — PATIENT INSTRUCTIONS
Dear Nubia So    After reviewing your responses, I've been able to diagnose you with a urinary tract infection, which is a common infection of the bladder with bacteria.  This is not a sexually transmitted infection, though urinating immediately after intercourse can help prevent infections.  Drinking lots of fluids is also helpful to clear your current infection and prevent the next one.      I have sent a prescription for antibiotics to your pharmacy to treat this infection.    It is important that you take all of your prescribed medication even if your symptoms are improving after a few doses.  Taking all of your medicine helps prevent the symptoms from returning.     If your symptoms worsen, you develop pain in your back or stomach, develop fevers, or are not improving in 5 days, please contact your primary care provider for an appointment or visit any of our convenient Walk-in or Urgent Care Centers to be seen, which can be found on our website here.    Thanks again for choosing us as your health care partner,    Nica Kern PA-C, [facility]    Urinary Tract Infections in Women  Urinary tract infections (UTIs) are most often caused by bacteria. These bacteria enter the urinary tract. The bacteria may come from inside the body. Or they may travel from the skin outside the rectum or vagina into the urethra. Female anatomy makes it easy for bacteria from the bowel to enter a woman s urinary tract, which is the most common source of UTI. This means women develop UTIs more often than men. Pain in or around the urinary tract is a common UTI symptom. But the only way to know for sure if you have a UTI for the healthcare provider to test your urine. The two tests that may be done are the urinalysis and urine culture.     Types of UTIs    Cystitis. A bladder infection (cystitis) is the most common UTI in women. You may have urgent or frequent need to pee. You may also have pain, burning when you pee, and bloody  urine.    Urethritis. This is an inflamed urethra, which is the tube that carries urine from the bladder to outside the body. You may have lower stomach or back pain. You may also have urgent or frequent need to pee.    Pyelonephritis. This is a kidney infection. If not treated, it can be serious and damage your kidneys. In severe cases, you may need to stay in the hospital. You may have a fever and lower back pain.    Medicines to treat a UTI  Most UTIs are treated with antibiotics. These kill the bacteria. The length of time you need to take them depends on the type of infection. It may be as short as 3 days. If you have repeated UTIs, you may need a low-dose antibiotic for several months. Take antibiotics exactly as directed. Don t stop taking them until all of the medicine is gone. If you stop taking the antibiotic too soon, the infection may not go away. You may also develop a resistance to the antibiotic. This can make it much harder to treat.   Lifestyle changes to treat and prevent UTIs   The lifestyle changes below will help get rid of your UTI. They may also help prevent future UTIs.     Drink plenty of fluids. This includes water, juice, or other caffeine-free drinks. Fluids help flush bacteria out of your body.    Empty your bladder. Always empty your bladder when you feel the urge to pee. And always pee before going to sleep. Urine that stays in your bladder can lead to infection. Try to pee before and after sex as well.    Practice good personal hygiene. Wipe yourself from front to back after using the toilet. This helps keep bacteria from getting into the urethra.    Use condoms during sex. These help prevent UTIs caused by sexually transmitted bacteria. Also don't use spermicides during sex. These can increase the risk for UTIs. Choose other forms of birth control instead. For women who tend to get UTIs after sex, a low-dose of a preventive antibiotic may be used. Be sure to discuss this option with  your healthcare provider.    Follow up with your healthcare provider as directed. He or she may test to make sure the infection has cleared. If needed, more treatment may be started.  Milly last reviewed this educational content on 7/1/2019 2000-2021 [employer]. All rights reserved. This information is not intended as a substitute for professional medical care. Always follow your healthcare professional's instructions.

## 2023-02-10 ENCOUNTER — OFFICE VISIT (OUTPATIENT)
Dept: FAMILY MEDICINE | Facility: CLINIC | Age: 58
End: 2023-02-10
Payer: COMMERCIAL

## 2023-02-10 VITALS
BODY MASS INDEX: 32.15 KG/M2 | DIASTOLIC BLOOD PRESSURE: 73 MMHG | RESPIRATION RATE: 16 BRPM | SYSTOLIC BLOOD PRESSURE: 118 MMHG | HEART RATE: 101 BPM | HEIGHT: 65 IN | OXYGEN SATURATION: 98 % | TEMPERATURE: 98.3 F | WEIGHT: 193 LBS

## 2023-02-10 DIAGNOSIS — F32.1 MODERATE MAJOR DEPRESSION (H): ICD-10-CM

## 2023-02-10 DIAGNOSIS — E78.00 PURE HYPERCHOLESTEROLEMIA: ICD-10-CM

## 2023-02-10 DIAGNOSIS — Z00.00 ROUTINE GENERAL MEDICAL EXAMINATION AT A HEALTH CARE FACILITY: Primary | ICD-10-CM

## 2023-02-10 DIAGNOSIS — F34.1 DYSTHYMIC DISORDER: ICD-10-CM

## 2023-02-10 DIAGNOSIS — E66.01 MORBID OBESITY (H): ICD-10-CM

## 2023-02-10 DIAGNOSIS — Z12.4 CERVICAL CANCER SCREENING: ICD-10-CM

## 2023-02-10 DIAGNOSIS — Z85.3 HX: BREAST CANCER: ICD-10-CM

## 2023-02-10 DIAGNOSIS — R63.5 WEIGHT GAIN: ICD-10-CM

## 2023-02-10 PROBLEM — F10.11 ALCOHOL ABUSE, IN REMISSION: Status: RESOLVED | Noted: 2019-09-23 | Resolved: 2023-02-10

## 2023-02-10 LAB
ALBUMIN SERPL BCG-MCNC: 4.6 G/DL (ref 3.5–5.2)
ALP SERPL-CCNC: 78 U/L (ref 35–104)
ALT SERPL W P-5'-P-CCNC: 39 U/L (ref 10–35)
ANION GAP SERPL CALCULATED.3IONS-SCNC: 11 MMOL/L (ref 7–15)
AST SERPL W P-5'-P-CCNC: 41 U/L (ref 10–35)
BASOPHILS # BLD AUTO: 0 10E3/UL (ref 0–0.2)
BASOPHILS NFR BLD AUTO: 1 %
BILIRUB SERPL-MCNC: 0.3 MG/DL
BUN SERPL-MCNC: 26.4 MG/DL (ref 6–20)
CALCIUM SERPL-MCNC: 10.1 MG/DL (ref 8.6–10)
CHLORIDE SERPL-SCNC: 105 MMOL/L (ref 98–107)
CHOLEST SERPL-MCNC: 256 MG/DL
CREAT SERPL-MCNC: 0.81 MG/DL (ref 0.51–0.95)
DEPRECATED HCO3 PLAS-SCNC: 24 MMOL/L (ref 22–29)
EOSINOPHIL # BLD AUTO: 0.1 10E3/UL (ref 0–0.7)
EOSINOPHIL NFR BLD AUTO: 2 %
ERYTHROCYTE [DISTWIDTH] IN BLOOD BY AUTOMATED COUNT: 13.7 % (ref 10–15)
GFR SERPL CREATININE-BSD FRML MDRD: 84 ML/MIN/1.73M2
GLUCOSE SERPL-MCNC: 121 MG/DL (ref 70–99)
HCT VFR BLD AUTO: 44.7 % (ref 35–47)
HDLC SERPL-MCNC: 54 MG/DL
HGB BLD-MCNC: 15 G/DL (ref 11.7–15.7)
IMM GRANULOCYTES # BLD: 0 10E3/UL
IMM GRANULOCYTES NFR BLD: 0 %
LDLC SERPL CALC-MCNC: 169 MG/DL
LYMPHOCYTES # BLD AUTO: 1.6 10E3/UL (ref 0.8–5.3)
LYMPHOCYTES NFR BLD AUTO: 41 %
MCH RBC QN AUTO: 30.2 PG (ref 26.5–33)
MCHC RBC AUTO-ENTMCNC: 33.6 G/DL (ref 31.5–36.5)
MCV RBC AUTO: 90 FL (ref 78–100)
MONOCYTES # BLD AUTO: 0.4 10E3/UL (ref 0–1.3)
MONOCYTES NFR BLD AUTO: 10 %
NEUTROPHILS # BLD AUTO: 1.8 10E3/UL (ref 1.6–8.3)
NEUTROPHILS NFR BLD AUTO: 46 %
NONHDLC SERPL-MCNC: 202 MG/DL
PLATELET # BLD AUTO: 290 10E3/UL (ref 150–450)
POTASSIUM SERPL-SCNC: 4.7 MMOL/L (ref 3.4–5.3)
PROT SERPL-MCNC: 7.4 G/DL (ref 6.4–8.3)
RBC # BLD AUTO: 4.96 10E6/UL (ref 3.8–5.2)
SODIUM SERPL-SCNC: 140 MMOL/L (ref 136–145)
T4 FREE SERPL-MCNC: 1.03 NG/DL (ref 0.9–1.7)
TRIGL SERPL-MCNC: 164 MG/DL
TSH SERPL DL<=0.005 MIU/L-ACNC: 5.94 UIU/ML (ref 0.3–4.2)
WBC # BLD AUTO: 3.9 10E3/UL (ref 4–11)

## 2023-02-10 PROCEDURE — 90750 HZV VACC RECOMBINANT IM: CPT | Performed by: FAMILY MEDICINE

## 2023-02-10 PROCEDURE — 80061 LIPID PANEL: CPT | Performed by: FAMILY MEDICINE

## 2023-02-10 PROCEDURE — 36415 COLL VENOUS BLD VENIPUNCTURE: CPT | Performed by: FAMILY MEDICINE

## 2023-02-10 PROCEDURE — 90682 RIV4 VACC RECOMBINANT DNA IM: CPT | Performed by: FAMILY MEDICINE

## 2023-02-10 PROCEDURE — 0124A COVID-19 VACCINE BIVALENT BOOSTER 12+ (PFIZER): CPT | Performed by: FAMILY MEDICINE

## 2023-02-10 PROCEDURE — 91312 COVID-19 VACCINE BIVALENT BOOSTER 12+ (PFIZER): CPT | Performed by: FAMILY MEDICINE

## 2023-02-10 PROCEDURE — 99214 OFFICE O/P EST MOD 30 MIN: CPT | Mod: 25 | Performed by: FAMILY MEDICINE

## 2023-02-10 PROCEDURE — 90472 IMMUNIZATION ADMIN EACH ADD: CPT | Performed by: FAMILY MEDICINE

## 2023-02-10 PROCEDURE — G0145 SCR C/V CYTO,THINLAYER,RESCR: HCPCS | Performed by: FAMILY MEDICINE

## 2023-02-10 PROCEDURE — 84439 ASSAY OF FREE THYROXINE: CPT | Performed by: FAMILY MEDICINE

## 2023-02-10 PROCEDURE — 87624 HPV HI-RISK TYP POOLED RSLT: CPT | Performed by: FAMILY MEDICINE

## 2023-02-10 PROCEDURE — 99396 PREV VISIT EST AGE 40-64: CPT | Mod: 25 | Performed by: FAMILY MEDICINE

## 2023-02-10 PROCEDURE — 90471 IMMUNIZATION ADMIN: CPT | Performed by: FAMILY MEDICINE

## 2023-02-10 PROCEDURE — 80050 GENERAL HEALTH PANEL: CPT | Performed by: FAMILY MEDICINE

## 2023-02-10 RX ORDER — VENLAFAXINE HYDROCHLORIDE 75 MG/1
150 CAPSULE, EXTENDED RELEASE ORAL DAILY
Qty: 180 CAPSULE | Refills: 3 | Status: SHIPPED | OUTPATIENT
Start: 2023-02-10 | End: 2024-01-22

## 2023-02-10 RX ORDER — BUPROPION HYDROCHLORIDE 300 MG/1
300 TABLET ORAL EVERY MORNING
Qty: 90 TABLET | Refills: 3 | Status: SHIPPED | OUTPATIENT
Start: 2023-02-10 | End: 2023-10-23

## 2023-02-10 RX ORDER — ATORVASTATIN CALCIUM 20 MG/1
20 TABLET, FILM COATED ORAL DAILY
Qty: 90 TABLET | Refills: 3 | Status: SHIPPED | OUTPATIENT
Start: 2023-02-10 | End: 2024-03-11

## 2023-02-10 ASSESSMENT — PATIENT HEALTH QUESTIONNAIRE - PHQ9
SUM OF ALL RESPONSES TO PHQ QUESTIONS 1-9: 8
10. IF YOU CHECKED OFF ANY PROBLEMS, HOW DIFFICULT HAVE THESE PROBLEMS MADE IT FOR YOU TO DO YOUR WORK, TAKE CARE OF THINGS AT HOME, OR GET ALONG WITH OTHER PEOPLE: SOMEWHAT DIFFICULT
SUM OF ALL RESPONSES TO PHQ QUESTIONS 1-9: 8

## 2023-02-10 ASSESSMENT — ENCOUNTER SYMPTOMS
BREAST MASS: 0
JOINT SWELLING: 0
PALPITATIONS: 0
ARTHRALGIAS: 0
HEMATOCHEZIA: 0
NERVOUS/ANXIOUS: 0
FREQUENCY: 0
SHORTNESS OF BREATH: 0
FEVER: 0
ABDOMINAL PAIN: 0
NAUSEA: 0
WEAKNESS: 0
DIZZINESS: 0
HEMATURIA: 0
CONSTIPATION: 0
MYALGIAS: 0
EYE PAIN: 0
HEADACHES: 0
COUGH: 0
HEARTBURN: 1
DIARRHEA: 0
CHILLS: 0
PARESTHESIAS: 0
DYSURIA: 0
SORE THROAT: 0

## 2023-02-10 NOTE — PROGRESS NOTES
ASSESSMENT/PLAN:       ICD-10-CM    1. Routine general medical examination at a health care facility  Z00.00 INFLUENZA QUAD, RECOMBINANT, P-FREE (RIV4) (FLUBLOK)     CBC with platelets and differential     Comprehensive metabolic panel (BMP + Alb, Alk Phos, ALT, AST, Total. Bili, TP)     CBC with platelets and differential     Comprehensive metabolic panel (BMP + Alb, Alk Phos, ALT, AST, Total. Bili, TP)      2. Dysthymic disorder  F34.1 venlafaxine (EFFEXOR XR) 75 MG 24 hr capsule     buPROPion (WELLBUTRIN XL) 300 MG 24 hr tablet      3. Pure hypercholesterolemia  E78.00 atorvastatin (LIPITOR) 20 MG tablet     Lipid panel reflex to direct LDL Fasting     Lipid panel reflex to direct LDL Fasting      4. Cervical cancer screening  Z12.4 Pap Screen with HPV - recommended age 30 - 65 years      5. Moderate major depression (H)  F32.1       6. HX: breast cancer  Z85.3 MA Screen Bilateral w/Homar      7. Weight gain  R63.5 TSH with free T4 reflex     TSH with free T4 reflex      8. Morbid obesity (H)  E66.01         Medical decision making: Patient is a 57-year-old with moderate depression which is active, hyperlipidemia for which she is taking atorvastatin presents today for annual exam  1: Depression ongoing and would like to increase medication.  Wellbutrin increased to 300 in the morning.  She also has some trouble with sleep at night and I discussed that we can consider trazodone in future.  Effexor is helping her and she would like to continue them  2: Hyperlipidemia: Continue current medication and will check lipid panel.  3: Morbid obesity complicated with hyperlipidemia: Discussed weight loss option.  In the past she has been seen by weight loss doctor and started on phentermine that she did not like.  Discussed dietary discretions.  4: History of breast cancer.  This was 11 your ago and in remission.  This was treated with lump removal, chemo and radiation.  Followed with [facility] oncology at [place]  [facility].  She went there last year regarding follow-up plan and they told her that routine exam and mammogram.  We will get records from the.          COUNSELING:  Reviewed preventive health counseling, as reflected in patient instructions       Regular exercise       Healthy diet/nutrition       Vision screening       Hearing screening       Alcohol Use       Colorectal Cancer Screening      BMI:   Estimated body mass index is 32.12 kg/m  as calculated from the following:    Height as of this encounter: 1.651 m (5' 5\").    Weight as of this encounter: 87.5 kg (193 lb).   Weight management plan: Discussed healthy diet and exercise guidelines      She reports that she has never smoked. She has been exposed to tobacco smoke. She has never used smokeless tobacco.       SUBJECTIVE:   CC: Nubia is an 57 year old who presents for preventive health visit.   Chief Complaint   Patient presents with     Physical       Patient has been advised of split billing requirements and indicates understanding: Yes  Healthy Habits:     Getting at least 3 servings of Calcium per day:  NO    Bi-annual eye exam:  Yes    Dental care twice a year:  Yes    Sleep apnea or symptoms of sleep apnea:  Daytime drowsiness    Diet:  Regular (no restrictions)    Frequency of exercise:  2-3 days/week    Duration of exercise:  45-60 minutes    Taking medications regularly:  Yes    Medication side effects:  Not applicable    PHQ-2 Total Score: 2    Additional concerns today:  No      Today's PHQ-2 Score:   PHQ-2 ( 1999 [employer]) 2/10/2023   Q1: Little interest or pleasure in doing things 1   Q2: Feeling down, depressed or hopeless 1   PHQ-2 Score 2   Q1: Little interest or pleasure in doing things Several days   Q2: Feeling down, depressed or hopeless Several days   PHQ-2 Score 2           Social History     Tobacco Use     Smoking status: Never     Passive exposure: Past     Smokeless tobacco: Never     Tobacco comments:     I grew up in home with " smoker   Substance Use Topics     Alcohol use: Yes     Alcohol/week: 0.0 - 2.0 standard drinks     Comment: 4/ week         Alcohol Use 2/10/2023   Prescreen: >3 drinks/day or >7 drinks/week? No       Reviewed orders with patient.  Reviewed health maintenance and updated orders accordingly - Yes  BP Readings from Last 3 Encounters:   02/10/23 118/73   12/03/21 126/85   04/20/21 128/82    Wt Readings from Last 3 Encounters:   02/10/23 87.5 kg (193 lb)   12/03/21 87.3 kg (192 lb 6.4 oz)   04/20/21 84.1 kg (185 lb 6.4 oz)                  Patient Active Problem List   Diagnosis     Urinary Tract Infection     Alcohol Abuse - Episodic     Depression With Anxiety     Hypercholesterolemia     Anxiety Disorder NOS     Arthritis     Dyslipidemia     Knee pain     Obesity (BMI 30.0-34.9)     Moderate major depression (H)     Benign neoplasm of ascending colon     Morbid obesity (H)     Past Surgical History:   Procedure Laterality Date     BIOPSY BREAST Right 2011    malignant and lumpectomy     BREAST SURGERY       COLONOSCOPY       DILATION AND CURETTAGE       EXCISE BREAST CYST/FIBROADENOMA/TUMOR/DUCT LESION/NIPPLE LESION/AREOLAR LESION Right 2011    Description: Breast Surgery Lumpectomy;  Proc Date: 05/01/2011;       Social History     Tobacco Use     Smoking status: Never     Passive exposure: Past     Smokeless tobacco: Never     Tobacco comments:     I grew up in home with smoker   Substance Use Topics     Alcohol use: Yes     Alcohol/week: 0.0 - 2.0 standard drinks     Comment: 4/ week     Family History   Problem Relation Age of Onset     Breast Cancer Paternal Aunt         40s?     Breast Cancer Cousin         Stage 4     Breast Cancer Paternal Aunt         40s?     Dementia Mother 70     Depression Mother      Anxiety Disorder Mother      Mental Illness Mother      Thyroid Disease Mother      Heart Failure Father      Heart Disease Father      Hypertension Father      Hyperlipidemia Father      No Known Problems  Sister      Psoriasis Son         with psoriatic arthritis         Current Outpatient Medications   Medication Sig Dispense Refill     atorvastatin (LIPITOR) 20 MG tablet Take 1 tablet (20 mg) by mouth daily 90 tablet 3     buPROPion (WELLBUTRIN XL) 300 MG 24 hr tablet Take 1 tablet (300 mg) by mouth every morning 90 tablet 3     multivitamin therapeutic (THERAGRAN) tablet [MULTIVITAMIN THERAPEUTIC (THERAGRAN) TABLET] Take 1 tablet by mouth daily.       venlafaxine (EFFEXOR XR) 75 MG 24 hr capsule Take 2 capsules (150 mg) by mouth daily 180 capsule 3       Breast Cancer Screening:    FHS-7:   Breast CA Risk Assessment (FHS-7) 7/15/2021 2/10/2023   Did any of your first-degree relatives have breast or ovarian cancer? Yes No   Did any of your relatives have bilateral breast cancer? No Unknown   Did any man in your family have breast cancer? No No   Did any woman in your family have breast and ovarian cancer? Yes Yes   Did any woman in your family have breast cancer before age 50 y? Yes Yes   Do you have 2 or more relatives with breast and/or ovarian cancer? Yes Unknown   Do you have 2 or more relatives with breast and/or bowel cancer? No Unknown       Mammogram Screening - Alternate mammogram schedule due to breast cancer history  Pertinent mammograms are reviewed under the imaging tab.    History of abnormal Pap smear: NO - age 30-65 PAP every 5 years with negative HPV co-testing recommended  PAP / HPV Latest Ref Rng & Units 3/7/2018 12/17/2015   PAP - Negative for squamous intraepithelial lesion or malignancy  Electronically signed by Susan Limon CT (ASCP) on 3/15/2018 at  2:04 PM   Negative for squamous intraepithelial lesion or malignancy  Electronically signed by Kaelyn Richmond CT (ASCP) on 12/30/2015 at  3:02 PM     HPV16 NEG Negative -   HPV18 NEG Negative -   HRHPV NEG Negative -     Reviewed and updated as needed this visit by clinical staff   Tobacco  Allergies  Meds  Problems  Med Hx  Surg Hx   "Fam Hx          Reviewed and updated as needed this visit by Provider   Tobacco  Allergies  Meds  Problems  Med Hx  Surg Hx  Fam Hx         Past Medical History:   Diagnosis Date     Arthritis      Breast cancer (H) 2011    right     Cancer (H) 2010    Breast      Chemical dependency (H) quit 2014    Alcohol     Depression      Dyslipidemia      Hx antineoplastic chemotherapy 2011    right lumpectomy     Hx of radiation therapy 2011    right breast     Knee pain      Menstrual disorder       Past Surgical History:   Procedure Laterality Date     BIOPSY BREAST Right 2011    malignant and lumpectomy     BREAST SURGERY       COLONOSCOPY       DILATION AND CURETTAGE       EXCISE BREAST CYST/FIBROADENOMA/TUMOR/DUCT LESION/NIPPLE LESION/AREOLAR LESION Right 2011    Description: Breast Surgery Lumpectomy;  Proc Date: 05/01/2011;       Review of Systems   Constitutional: Negative for chills and fever.   HENT: Negative for congestion, ear pain, hearing loss and sore throat.    Eyes: Negative for pain and visual disturbance.   Respiratory: Negative for cough and shortness of breath.    Cardiovascular: Negative for chest pain, palpitations and peripheral edema.   Gastrointestinal: Positive for heartburn. Negative for abdominal pain, constipation, diarrhea, hematochezia and nausea.   Breasts:  Negative for tenderness, breast mass and discharge.   Genitourinary: Negative for dysuria, frequency, genital sores, hematuria, pelvic pain, urgency, vaginal bleeding and vaginal discharge.   Musculoskeletal: Negative for arthralgias, joint swelling and myalgias.   Skin: Negative for rash.   Neurological: Negative for dizziness, weakness, headaches and paresthesias.   Psychiatric/Behavioral: Negative for mood changes. The patient is not nervous/anxious.      Heartburn is intermittent and takes 1-2 Pepcid.     OBJECTIVE:   /73   Pulse 101   Temp 98.3  F (36.8  C) (Oral)   Resp 16   Ht 1.651 m (5' 5\")   Wt 87.5 kg (193 " lb)   SpO2 98%   BMI 32.12 kg/m    Physical Exam  GENERAL APPEARANCE: healthy, alert and no distress  EYES: Eyes grossly normal to inspection, PERRL and conjunctivae and sclerae normal  HENT: ear canals and TM's normal, nose and mouth without ulcers or lesions, oropharynx clear and oral mucous membranes moist  NECK: no adenopathy, no asymmetry, masses, or scars and thyroid normal to palpation  RESP: lungs clear to auscultation - no rales, rhonchi or wheezes  BREAST: normal without masses, tenderness or nipple discharge, no palpable axillary masses or adenopathy and noted previous scar on the right breast  CV: regular rate and rhythm, normal S1 S2, no S3 or S4, no murmur, click or rub, no peripheral edema and peripheral pulses strong  ABDOMEN: soft, nontender, no hepatosplenomegaly, no masses and bowel sounds normal   (female): normal female external genitalia, normal urethral meatus, vaginal mucosal atrophy noted, normal cervix, adnexae, and uterus without masses or abnormal discharge  MS: no musculoskeletal defects are noted and gait is age appropriate without ataxia  SKIN: no suspicious lesions or rashes  NEURO: Normal strength and tone, sensory exam grossly normal, mentation intact and speech normal  PSYCH: mentation appears normal and affect normal/bright    Diagnostic Test Results:  Labs reviewed in Epic  Results for orders placed or performed in visit on 02/10/23 (from the past 24 hour(s))   CBC with platelets and differential    Narrative    The following orders were created for panel order CBC with platelets and differential.  Procedure                               Abnormality         Status                     ---------                               -----------         ------                     CBC with platelets and d...[[id number]]  Abnormal            Final result                 Please view results for these tests on the individual orders.   CBC with platelets and differential   Result Value Ref  Range    WBC Count 3.9 (L) 4.0 - 11.0 10e3/uL    RBC Count 4.96 3.80 - 5.20 10e6/uL    Hemoglobin 15.0 11.7 - 15.7 g/dL    Hematocrit 44.7 35.0 - 47.0 %    MCV 90 78 - 100 fL    MCH 30.2 26.5 - 33.0 pg    MCHC 33.6 31.5 - 36.5 g/dL    RDW 13.7 10.0 - 15.0 %    Platelet Count 290 150 - 450 10e3/uL    % Neutrophils 46 %    % Lymphocytes 41 %    % Monocytes 10 %    % Eosinophils 2 %    % Basophils 1 %    % Immature Granulocytes 0 %    Absolute Neutrophils 1.8 1.6 - 8.3 10e3/uL    Absolute Lymphocytes 1.6 0.8 - 5.3 10e3/uL    Absolute Monocytes 0.4 0.0 - 1.3 10e3/uL    Absolute Eosinophils 0.1 0.0 - 0.7 10e3/uL    Absolute Basophils 0.0 0.0 - 0.2 10e3/uL    Absolute Immature Granulocytes 0.0 <=0.4 10e3/uL       Royal Stewart MD  [facility]  Answers for HPI/ROS submitted by the patient on 2/10/2023  If you checked off any problems, how difficult have these problems made it for you to do your work, take care of things at home, or get along with other people?: Somewhat difficult  PHQ9 TOTAL SCORE: 8      Answers for HPI/ROS submitted by the patient on 2/10/2023  If you checked off any problems, how difficult have these problems made it for you to do your work, take care of things at home, or get along with other people?: Somewhat difficult  PHQ9 TOTAL SCORE: 8

## 2023-02-14 DIAGNOSIS — E78.00 PURE HYPERCHOLESTEROLEMIA: ICD-10-CM

## 2023-02-14 DIAGNOSIS — R79.89 ELEVATED TSH: ICD-10-CM

## 2023-02-14 DIAGNOSIS — R74.8 ELEVATED LIVER ENZYMES: Primary | ICD-10-CM

## 2023-02-15 LAB
BKR LAB AP GYN ADEQUACY: NORMAL
BKR LAB AP GYN INTERPRETATION: NORMAL
BKR LAB AP HPV REFLEX: NORMAL
BKR LAB AP PREVIOUS ABNORMAL: NORMAL
PATH REPORT.COMMENTS IMP SPEC: NORMAL
PATH REPORT.COMMENTS IMP SPEC: NORMAL
PATH REPORT.RELEVANT HX SPEC: NORMAL

## 2023-02-17 LAB
HUMAN PAPILLOMA VIRUS 16 DNA: NEGATIVE
HUMAN PAPILLOMA VIRUS 18 DNA: NEGATIVE
HUMAN PAPILLOMA VIRUS FINAL DIAGNOSIS: NORMAL
HUMAN PAPILLOMA VIRUS OTHER HR: NEGATIVE

## 2023-03-08 ENCOUNTER — MYC MEDICAL ADVICE (OUTPATIENT)
Dept: FAMILY MEDICINE | Facility: CLINIC | Age: 58
End: 2023-03-08
Payer: COMMERCIAL

## 2023-03-08 DIAGNOSIS — E66.01 MORBID OBESITY (H): ICD-10-CM

## 2023-03-08 DIAGNOSIS — N88.9 LESION OF CERVIX: Primary | ICD-10-CM

## 2023-03-09 DIAGNOSIS — E66.01 MORBID OBESITY (H): Primary | ICD-10-CM

## 2023-03-09 NOTE — TELEPHONE ENCOUNTER
Please inform patient that weight loss specialist will discuss all options available other than phentermine.  I have already made a referral and somebody should be getting to her from the [facility] services  Royal Stewart MD

## 2023-03-13 NOTE — TELEPHONE ENCOUNTER
If I have mentioned that I will be able to remove the cyst, please schedule her for a procedure appointment.    Royal Stewart MD

## 2023-05-04 ENCOUNTER — OFFICE VISIT (OUTPATIENT)
Dept: SURGERY | Facility: CLINIC | Age: 58
End: 2023-05-04
Attending: FAMILY MEDICINE
Payer: COMMERCIAL

## 2023-05-04 VITALS
HEIGHT: 65 IN | DIASTOLIC BLOOD PRESSURE: 82 MMHG | SYSTOLIC BLOOD PRESSURE: 118 MMHG | BODY MASS INDEX: 32.59 KG/M2 | WEIGHT: 195.6 LBS

## 2023-05-04 DIAGNOSIS — R73.01 ELEVATED FASTING GLUCOSE: ICD-10-CM

## 2023-05-04 DIAGNOSIS — E66.811 CLASS 1 OBESITY WITH BODY MASS INDEX (BMI) OF 30.0 TO 30.9 IN ADULT, UNSPECIFIED OBESITY TYPE, UNSPECIFIED WHETHER SERIOUS COMORBIDITY PRESENT: Primary | ICD-10-CM

## 2023-05-04 PROCEDURE — 99214 OFFICE O/P EST MOD 30 MIN: CPT | Performed by: FAMILY MEDICINE

## 2023-05-04 RX ORDER — SEMAGLUTIDE 1 MG/.5ML
1 INJECTION, SOLUTION SUBCUTANEOUS WEEKLY
Qty: 2 ML | Refills: 0 | Status: SHIPPED | OUTPATIENT
Start: 2023-07-04 | End: 2023-07-03

## 2023-05-04 RX ORDER — SEMAGLUTIDE 0.5 MG/.5ML
0.5 INJECTION, SOLUTION SUBCUTANEOUS WEEKLY
Qty: 2 ML | Refills: 0 | Status: SHIPPED | OUTPATIENT
Start: 2023-06-04 | End: 2023-07-02

## 2023-05-04 RX ORDER — SEMAGLUTIDE 0.25 MG/.5ML
0.25 INJECTION, SOLUTION SUBCUTANEOUS WEEKLY
Qty: 2 ML | Refills: 0 | Status: SHIPPED | OUTPATIENT
Start: 2023-05-04 | End: 2023-06-01

## 2023-05-04 RX ORDER — PHENTERMINE HYDROCHLORIDE 37.5 MG/1
18.75-37.5 TABLET ORAL
Qty: 90 TABLET | Refills: 1 | Status: SHIPPED | OUTPATIENT
Start: 2023-05-04 | End: 2023-10-16

## 2023-05-04 NOTE — LETTER
5/4/2023         RE: Nubia So  [address]  [place] [address]        Dear Colleague,    Thank you for referring your patient, Nubia So, to the [facility] [place]. Please see a copy of my visit note below.    Bariatric Follow-up    57 year old  female BMI:Body mass index is 32.55 kg/m .    Weight:   Wt Readings from Last 1 Encounters:   05/04/23 88.7 kg (195 lb 9.6 oz)    pounds      Comorbidities:  Patient Active Problem List   Diagnosis     Urinary Tract Infection     Alcohol Abuse - Episodic     Depression With Anxiety     Hypercholesterolemia     Anxiety Disorder NOS     Arthritis     Dyslipidemia     Knee pain     Obesity (BMI 30.0-34.9)     Moderate major depression (H)     Benign neoplasm of ascending colon     Morbid obesity (H)       Interim: Struggling with weight. Stress is high. Son getting . Mother in hospice. Relationship with sister not great. Knee arthritis.   Feels she knows what to do and can't do it. Breast cancer at 45 yo and post menopausal. States she is Eating healthy foods-just too much and too much snacking.    Plan:  DIET  Begin working with the RD to again establish more whole nutrient dense foods, 3 meals.    EXERCISE continue elliptical with consistency   PHARMACOTHERAPY Wegovy will help to achieve feeling full.Can use phentermine prn     Discussed importance of sleep and stress management, optimizing metabolism in post menopausal years.      -We reviewed her medications and whether associated with weight gain.  Current Outpatient Medications   Medication     atorvastatin (LIPITOR) 20 MG tablet     buPROPion (WELLBUTRIN XL) 300 MG 24 hr tablet     multivitamin therapeutic (THERAGRAN) tablet     phentermine (ADIPEX-P) 37.5 MG tablet     Semaglutide-Weight Management (WEGOVY) 0.25 MG/0.5ML pen     [START ON 6/4/2023] Semaglutide-Weight Management (WEGOVY) 0.5 MG/0.5ML pen     [START ON 7/4/2023] Semaglutide-Weight  "Management (WEGOVY) 1 MG/0.5ML pen     venlafaxine (EFFEXOR XR) 75 MG 24 hr capsule     No current facility-administered medications for this visit.        We discussed [facility] Bariatric Basics including:  -eating 3 meals daily  -eating protein first  -eating slowly, chewing food well  -avoiding/limiting calorie containing beverages  -choosing wheat, not white with breads, crackers, pastas, jennifer, bagels, tortillas, rice  -limiting restaurant or cafeteria eating to twice a week or less  -We discussed the importance of restorative sleep and stress management in maintaining a healthy weight.  -We discussed insulin resistance and glycemic index as it relates to appetite and weight control  -We discussed the National Weight Control Registry healthy weight maintenance strategies and ways to optimize metabolism.  -We discussed the importance of physical activity including cardiovascular and strength training in maintaining a healthier weight and explored viable options.    Most recent labs:  Lab Results   Component Value Date    WBC 3.9 (L) 02/10/2023    HGB 15.0 02/10/2023    HCT 44.7 02/10/2023    MCV 90 02/10/2023     02/10/2023     Lab Results   Component Value Date    CHOL 256 (H) 02/10/2023     Lab Results   Component Value Date    HDL 54 02/10/2023       Lab Results   Component Value Date    TRIG 164 (H) 02/10/2023       Lab Results   Component Value Date    ALT 39 (H) 02/10/2023    AST 41 (H) 02/10/2023    ALKPHOS 78 02/10/2023       Lab Results   Component Value Date    TSH 5.94 (H) 02/10/2023     DIETARY HISTORY  Meals Per Day: 3+ snacks  Eating Protein First?: sometimes  Food Diary: B: L:out salad/protein bowls D:protein and a veggie/starch  Snacks Per Day: \"too many\"  Typical Snack: varies. Sometimes healthy, sometimes not  Fluid Intake:   Portion Control: problematic  Calorie Containing Beverages: no  Alcohol per week: 0-4  Typical Protein Food Choices: mainly lean  Choosing Whole Grains: " "yes  Grocery Shopping is done by: herself  Food Preparation is done by: herself  Meals at Restaurant/Cafeteria/Take Out Per Week: 4+  Eating at the Table:   TV is Off During Meals:     Positive Changes Since Last Visit:   Struggling With: stress, frustration    Knowledgeable in Reading Food Labels:   Getting Adequate Protein: likely  Sleeping 7-8 hours/day not always  Stress management elliptical     PHYSICAL ACTIVITY PATTERNS:  Cardiovascular: elliptical  Strength Training: elliptical    REVIEW OF SYSTEMS  GENERAL/CONSTITUTIONAL:  Fatigue: yes  CARDIOVASCULAR:  Chest Pain with Exertion: no  PULMONARY:  Dyspnea on exertion: no  CPAP Use: no  Tobacco Use: no  Asthma Controlled:   GASTROINTESTINAL:  GERD/Heartburn:   Gallbladder:   UROLOGIC:  Urinary Symptoms:   NEUROLOGIC:  Headaches:   Paresthesias:   PSYCHIATRIC:  Moods: high stress/stable  MUSCULOSKELETAL/RHEUMATOLOGIC  Arthralgias: yes  Myalgias: yes  ENDOCRINE:  Monitoring Blood Sugars: no  Sugars Well Controlled: random 121mg/dl  DERMATOLOGIC:  Rashes: no    PHYSICAL EXAM:  Vitals: /82 (BP Location: Right arm, Patient Position: Sitting)   Ht 1.651 m (5' 5\")   Wt 88.7 kg (195 lb 9.6 oz)   BMI 32.55 kg/m      GEN: Pleasant, well groomed, in no acute distress  EYES: EOMI,  ENT: airway patent  NECK: no carotid bruits, no anterior/supraclavicular lymphadenopathy, thyroid normal   HEART: Rhythm regular, rate regular, no murmur   LUNGS: Clear  ABDOMEN: soft, non-tender, obese, no rashes   VASCULAR: no  lower extremity edema  MUSCULOSKELETAL:  muscle mass OK  SKIN:  no color changes of venous stasis, no ulcerations    Total time spent on the date of this encounter doing: chart review, review of test results, patient visit, physical exam, education, counseling, developing plan of care, and documenting = 30minutes.              Again, thank you for allowing me to participate in the care of your patient.        Sincerely,        Kay Bateman MD    "

## 2023-05-04 NOTE — PROGRESS NOTES
Bariatric Follow-up    57 year old  female BMI:Body mass index is 32.55 kg/m .    Weight:   Wt Readings from Last 1 Encounters:   05/04/23 88.7 kg (195 lb 9.6 oz)    pounds      Comorbidities:  Patient Active Problem List   Diagnosis     Urinary Tract Infection     Alcohol Abuse - Episodic     Depression With Anxiety     Hypercholesterolemia     Anxiety Disorder NOS     Arthritis     Dyslipidemia     Knee pain     Obesity (BMI 30.0-34.9)     Moderate major depression (H)     Benign neoplasm of ascending colon     Morbid obesity (H)       Interim: Struggling with weight. Stress is high. Son getting . Mother in hospice. Relationship with sister not great. Knee arthritis.   Feels she knows what to do and can't do it. Breast cancer at 45 yo and post menopausal. States she is Eating healthy foods-just too much and too much snacking.    Plan:  DIET  Begin working with the RD to again establish more whole nutrient dense foods, 3 meals.    EXERCISE continue elliptical with consistency   PHARMACOTHERAPY Wegovy will help to achieve feeling full.Can use phentermine prn     Discussed importance of sleep and stress management, optimizing metabolism in post menopausal years.      -We reviewed her medications and whether associated with weight gain.  Current Outpatient Medications   Medication     atorvastatin (LIPITOR) 20 MG tablet     buPROPion (WELLBUTRIN XL) 300 MG 24 hr tablet     multivitamin therapeutic (THERAGRAN) tablet     phentermine (ADIPEX-P) 37.5 MG tablet     Semaglutide-Weight Management (WEGOVY) 0.25 MG/0.5ML pen     [START ON 6/4/2023] Semaglutide-Weight Management (WEGOVY) 0.5 MG/0.5ML pen     [START ON 7/4/2023] Semaglutide-Weight Management (WEGOVY) 1 MG/0.5ML pen     venlafaxine (EFFEXOR XR) 75 MG 24 hr capsule     No current facility-administered medications for this visit.        We discussed [facility] Basics including:  -eating 3 meals daily  -eating protein first  -eating slowly,  "chewing food well  -avoiding/limiting calorie containing beverages  -choosing wheat, not white with breads, crackers, pastas, jennifer, bagels, tortillas, rice  -limiting restaurant or cafeteria eating to twice a week or less  -We discussed the importance of restorative sleep and stress management in maintaining a healthy weight.  -We discussed insulin resistance and glycemic index as it relates to appetite and weight control  -We discussed the National Weight Control Registry healthy weight maintenance strategies and ways to optimize metabolism.  -We discussed the importance of physical activity including cardiovascular and strength training in maintaining a healthier weight and explored viable options.    Most recent labs:  Lab Results   Component Value Date    WBC 3.9 (L) 02/10/2023    HGB 15.0 02/10/2023    HCT 44.7 02/10/2023    MCV 90 02/10/2023     02/10/2023     Lab Results   Component Value Date    CHOL 256 (H) 02/10/2023     Lab Results   Component Value Date    HDL 54 02/10/2023       Lab Results   Component Value Date    TRIG 164 (H) 02/10/2023       Lab Results   Component Value Date    ALT 39 (H) 02/10/2023    AST 41 (H) 02/10/2023    ALKPHOS 78 02/10/2023       Lab Results   Component Value Date    TSH 5.94 (H) 02/10/2023     DIETARY HISTORY  Meals Per Day: 3+ snacks  Eating Protein First?: sometimes  Food Diary: B: L:out salad/protein bowls D:protein and a veggie/starch  Snacks Per Day: \"too many\"  Typical Snack: varies. Sometimes healthy, sometimes not  Fluid Intake:   Portion Control: problematic  Calorie Containing Beverages: no  Alcohol per week: 0-4  Typical Protein Food Choices: mainly lean  Choosing Whole Grains: yes  Grocery Shopping is done by: herself  Food Preparation is done by: herself  Meals at Restaurant/Cafeteria/Take Out Per Week: 4+  Eating at the Table:   TV is Off During Meals:     Positive Changes Since Last Visit:   Struggling With: stress, frustration    Knowledgeable in " "Reading Food Labels:   Getting Adequate Protein: likely  Sleeping 7-8 hours/day not always  Stress management elliptical     PHYSICAL ACTIVITY PATTERNS:  Cardiovascular: elliptical  Strength Training: elliptical    REVIEW OF SYSTEMS  GENERAL/CONSTITUTIONAL:  Fatigue: yes  CARDIOVASCULAR:  Chest Pain with Exertion: no  PULMONARY:  Dyspnea on exertion: no  CPAP Use: no  Tobacco Use: no  Asthma Controlled:   GASTROINTESTINAL:  GERD/Heartburn:   Gallbladder:   UROLOGIC:  Urinary Symptoms:   NEUROLOGIC:  Headaches:   Paresthesias:   PSYCHIATRIC:  Moods: high stress/stable  MUSCULOSKELETAL/RHEUMATOLOGIC  Arthralgias: yes  Myalgias: yes  ENDOCRINE:  Monitoring Blood Sugars: no  Sugars Well Controlled: random 121mg/dl  DERMATOLOGIC:  Rashes: no    PHYSICAL EXAM:  Vitals: /82 (BP Location: Right arm, Patient Position: Sitting)   Ht 1.651 m (5' 5\")   Wt 88.7 kg (195 lb 9.6 oz)   BMI 32.55 kg/m      GEN: Pleasant, well groomed, in no acute distress  EYES: EOMI,  ENT: airway patent  NECK: no carotid bruits, no anterior/supraclavicular lymphadenopathy, thyroid normal   HEART: Rhythm regular, rate regular, no murmur   LUNGS: Clear  ABDOMEN: soft, non-tender, obese, no rashes   VASCULAR: no  lower extremity edema  MUSCULOSKELETAL:  muscle mass OK  SKIN:  no color changes of venous stasis, no ulcerations    Total time spent on the date of this encounter doing: chart review, review of test results, patient visit, physical exam, education, counseling, developing plan of care, and documenting = 30minutes.          "

## 2023-05-04 NOTE — PATIENT INSTRUCTIONS
[facility]    Remember to:    -Eat 3 meals a day (not 2, not 5) Chew your food well/SLOW down  -Eat your protein first  -Be a water drinker/Minize liquid calories (no regular pop, no juice) skim or 1% milk OK  -Sleep 7-8 hours each night. Address sleep if problematic  -Stress management is important. Address if problematic  -Move-8000 steps daily Muscle: maintain your muscle mass (strength training 2X/wk)  -Wheat, not white (bread, pasta, crackers, jennifer, bagels, tortillas, rice)  -Limit restaurant, cafeteria, take out, drive through to 2 times per week or less  -Minimize caffeine, alcohol, and night-time snacking  -Consider keeping a food diary (i.e. My Fitness Pal, Lose It, or other food tracker)  -Follow up with the dietitian      **Some lean proteins: chicken, turkey, tuna, salmon, crab, fish, shrimp, scallops, lobster, lean cuts of beef and pork, luncheon meats, veggie burgers, beans (black, lima, garbanzo, beach, kidney, refried), chile, cottage cheese, string cheese, other cheese, eggs, tofu, peanut butter, nuts, vegan crumbles, greek yogurt

## 2023-05-08 ENCOUNTER — VIRTUAL VISIT (OUTPATIENT)
Dept: SURGERY | Facility: CLINIC | Age: 58
End: 2023-05-08
Payer: COMMERCIAL

## 2023-05-08 ENCOUNTER — TELEPHONE (OUTPATIENT)
Dept: SURGERY | Facility: CLINIC | Age: 58
End: 2023-05-08

## 2023-05-08 DIAGNOSIS — E66.9 OBESITY (BMI 30-39.9): Primary | ICD-10-CM

## 2023-05-08 PROCEDURE — 97802 MEDICAL NUTRITION INDIV IN: CPT | Mod: 95

## 2023-05-08 NOTE — PROGRESS NOTES
Nubia So is a 57 year old who is being evaluated via a billable telephone    What phone number would you like to be contacted at? [phone]   [facility]        Medical Weight Loss Initial Diet Evaluation  Assessment:  This patient was referred by Dr. Bateman for MNT as treatment for Obesity which is impacting her depression, dyslipidemia and pain.   Nubia is presenting today for a new weight management nutrition consultation. Pt has had an initial appointment with Dr. Bateman.    Weight loss medication: Phentermine.     Personal Goals: wants to get down to 150 lbs     Anthropometrics:    Pt's weight is 195 lbs  Initial weight: 195 lbs     BMI: 32.5 kg/m2  Ideal body weight: 57 kg (125 lb 10.6 oz)  Adjusted ideal body weight: 69.7 kg (153 lb 10.2 oz)  Estimated RMR ([place]-St Jeor equation):  1,470 kcals x 1.2 (sedentary) = 1,770 kcals (for weight maintenance)    Recommended Protein Intake: 60-80 grams of protein/day    Medical History:  Patient Active Problem List   Diagnosis     Urinary Tract Infection     Alcohol Abuse - Episodic     Depression With Anxiety     Hypercholesterolemia     Anxiety Disorder NOS     Arthritis     Dyslipidemia     Knee pain     Obesity (BMI 30.0-34.9)     Moderate major depression (H)     Benign neoplasm of ascending colon     Morbid obesity (H)      Diabetes: No   HbA1c:  No results found for: HGBA1C    Nutrition History:   Food allergies/intolerances/cultural or religous food customs: No     Weight loss history: Patient has hx with MWM prior to Covid-19 pandemic and was maintain her 160lbs weight. Has hx with eating disorder in 2009. Stress is high, her mom is in memory care, her son is getting  and her job is demanding. Family stresses. Recently dx with depression. Portion control is difficult for her.    Vitamins/Mineral Supplementation: MVI    Dietary Recall:  Breakfast: skips or yogurt or eggs   Lunch: meat/deli sandwich with whole wheat  bread, salads  Dinner: protein, vegetable and starch     Beverages: Water, diet coke occasionally     Exercise: walks with her dog, goes to the gym 2-3x per week     Nutrition Diagnosis (PES statement):       Obesity related to excessive energy intake as evidence by patient report of large portions, intake of high fat and carb containing foods and BMI of 32.55 kg/m2       Nutrition Intervention  1. Food and/or Nutrient Delivery   a. Placed emphasis on importance of developing a healthy meal routine, aiming for 3 meals a day and no snacks.  b. Discussed using a protein supplement as a meal replacement.  2. Nutrition Education   a. Discussed with patient how to build a meal: the importance of including a lean/low fat protein at each meal, include a source of vegetables at a minimum of lunch and dinner and limiting carbohydrate intake  b. Educated on sources of lean protein, portion sizes, the amount of grams found in each source. Recommend patient to aim for 20-30g protein at each meal.  c. Discussed the importance of adequate hydration, with emphasis on drinking 64oz of water or zero calorie beverages per day.  3. Nutrition Counseling   a. Encouraged importance of developing routine exercise for health benefits and weight loss.  b. Discussed mindful eating techniques such as eating off smaller places/bowls, taking 20-30 minutes to eat in a calm/relaxed environment without distractions.      Goals established by patient:   1. Aim for meals to last 20 min  2. Aim to get 80 oz water daily.  3. Aim to get 20g protein per meal.     Handouts provided:  Keys to success    Assessment/Plan:    Pt will follow up in 3 month(s) with bariatrician and 2 month(s) with dietitian.       Phone call duration: 26 minutes    Originating Location (pt. Location): Home        Distant Location (provider location):  Off-site    Mode of Communication:  Video Conference via [employer]    Physician has received verbal consent for a Video Visit  from the patient? Yes      Monique Crow RD

## 2023-05-08 NOTE — LETTER
5/8/2023         RE: Nubia So  [address]  [place] [address]        Dear Colleague,    Thank you for referring your patient, Nubia So, to the [facility] [place]. Please see a copy of my visit note below.    Nubia So is a 57 year old who is being evaluated via a billable telephone    What phone number would you like to be contacted at? [phone]   [facility]        Medical Weight Loss Initial Diet Evaluation  Assessment:  This patient was referred by Dr. Bateman for MNT as treatment for Obesity which is impacting her depression, dyslipidemia and pain.   Nubia is presenting today for a new weight management nutrition consultation. Pt has had an initial appointment with Dr. Bateman.    Weight loss medication: Phentermine.     Personal Goals: wants to get down to 150 lbs     Anthropometrics:    Pt's weight is 195 lbs  Initial weight: 195 lbs     BMI: 32.5 kg/m2  Ideal body weight: 57 kg (125 lb 10.6 oz)  Adjusted ideal body weight: 69.7 kg (153 lb 10.2 oz)  Estimated RMR ([place]-St Jeor equation):  1,470 kcals x 1.2 (sedentary) = 1,770 kcals (for weight maintenance)    Recommended Protein Intake: 60-80 grams of protein/day    Medical History:  Patient Active Problem List   Diagnosis     Urinary Tract Infection     Alcohol Abuse - Episodic     Depression With Anxiety     Hypercholesterolemia     Anxiety Disorder NOS     Arthritis     Dyslipidemia     Knee pain     Obesity (BMI 30.0-34.9)     Moderate major depression (H)     Benign neoplasm of ascending colon     Morbid obesity (H)      Diabetes: No   HbA1c:  No results found for: HGBA1C    Nutrition History:   Food allergies/intolerances/cultural or religous food customs: No     Weight loss history: Patient has hx with MWM prior to Covid-19 pandemic and was maintain her 160lbs weight. Has hx with eating disorder in 2009. Stress is high, her mom is in memory care, her son  is getting  and her job is demanding. Family stresses. Recently dx with depression. Portion control is difficult for her.    Vitamins/Mineral Supplementation: MVI    Dietary Recall:  Breakfast: skips or yogurt or eggs   Lunch: meat/deli sandwich with whole wheat bread, salads  Dinner: protein, vegetable and starch     Beverages: Water, diet coke occasionally     Exercise: walks with her dog, goes to the gym 2-3x per week     Nutrition Diagnosis (PES statement):       Obesity related to excessive energy intake as evidence by patient report of large portions, intake of high fat and carb containing foods and BMI of 32.55 kg/m2       Nutrition Intervention  1. Food and/or Nutrient Delivery   a. Placed emphasis on importance of developing a healthy meal routine, aiming for 3 meals a day and no snacks.  b. Discussed using a protein supplement as a meal replacement.  2. Nutrition Education   a. Discussed with patient how to build a meal: the importance of including a lean/low fat protein at each meal, include a source of vegetables at a minimum of lunch and dinner and limiting carbohydrate intake  b. Educated on sources of lean protein, portion sizes, the amount of grams found in each source. Recommend patient to aim for 20-30g protein at each meal.  c. Discussed the importance of adequate hydration, with emphasis on drinking 64oz of water or zero calorie beverages per day.  3. Nutrition Counseling   a. Encouraged importance of developing routine exercise for health benefits and weight loss.  b. Discussed mindful eating techniques such as eating off smaller places/bowls, taking 20-30 minutes to eat in a calm/relaxed environment without distractions.      Goals established by patient:   1. Aim for meals to last 20 min  2. Aim to get 80 oz water daily.  3. Aim to get 20g protein per meal.     Handouts provided:  Keys to success    Assessment/Plan:    Pt will follow up in 3 month(s) with bariatrician and 2 month(s)  with dietitian.       Phone call duration: 26 minutes    Originating Location (pt. Location): Home        Distant Location (provider location):  Off-site    Mode of Communication:  Video Conference via [facility]    Physician has received verbal consent for a Video Visit from the patient? Yes      Monique Crow RD          Again, thank you for allowing me to participate in the care of your patient.        Sincerely,        Monique Crow RD

## 2023-05-08 NOTE — TELEPHONE ENCOUNTER
PA requested for Wegovy 0.25, 0.5 and 1 mg doses.     Susan Augustine RN, CBN  [facility]  P [phone]  F [phone]

## 2023-05-12 NOTE — TELEPHONE ENCOUNTER
PRIOR AUTHORIZATION DENIED    Medication: Semaglutide-Weight Management (WEGOVY) 0.25 MG/0.5ML pen - EPA DENIED    Denial Date: 5/9/2023    Denial Rational: WEIGHT LOSS MEDS - PLAN EXCLUSION FOR INS      Appeal Information: N/A

## 2023-06-14 DIAGNOSIS — F34.1 DYSTHYMIC DISORDER: ICD-10-CM

## 2023-06-15 ENCOUNTER — PATIENT OUTREACH (OUTPATIENT)
Dept: CARE COORDINATION | Facility: CLINIC | Age: 58
End: 2023-06-15
Payer: COMMERCIAL

## 2023-06-15 RX ORDER — BUPROPION HYDROCHLORIDE 300 MG/1
300 TABLET ORAL EVERY MORNING
Qty: 90 TABLET | Refills: 3 | OUTPATIENT
Start: 2023-06-15

## 2023-07-13 ENCOUNTER — PATIENT OUTREACH (OUTPATIENT)
Dept: CARE COORDINATION | Facility: CLINIC | Age: 58
End: 2023-07-13
Payer: COMMERCIAL

## 2023-07-25 ENCOUNTER — OFFICE VISIT (OUTPATIENT)
Dept: SURGERY | Facility: CLINIC | Age: 58
End: 2023-07-25
Payer: COMMERCIAL

## 2023-07-25 VITALS
BODY MASS INDEX: 30.89 KG/M2 | HEIGHT: 65 IN | WEIGHT: 185.4 LBS | SYSTOLIC BLOOD PRESSURE: 128 MMHG | DIASTOLIC BLOOD PRESSURE: 72 MMHG

## 2023-07-25 DIAGNOSIS — E66.811 OBESITY (BMI 30.0-34.9): Primary | ICD-10-CM

## 2023-07-25 DIAGNOSIS — R63.8 ABNORMAL CRAVING: ICD-10-CM

## 2023-07-25 PROCEDURE — 99214 OFFICE O/P EST MOD 30 MIN: CPT | Performed by: FAMILY MEDICINE

## 2023-07-25 RX ORDER — NALTREXONE HYDROCHLORIDE 50 MG/1
TABLET, FILM COATED ORAL
Qty: 45 TABLET | Refills: 3 | Status: SHIPPED | OUTPATIENT
Start: 2023-07-25 | End: 2024-01-31

## 2023-07-25 NOTE — PROGRESS NOTES
2018 3:36 PM 
 
Patient:  Trinity Rouse YOB: 1952 Date of Visit: 2018 Dear Eryn Valenzuela, NP 
[address] [facility] [id number] VIA Facsimile: [phone] 
 : Thank you for referring Ms. Trinity Rouse to me for evaluation/treatment. Below are the relevant portions of my assessment and plan of care. HISTORY OF PRESENT ILLNESS Trinity Rouse is a 77 y.o. female. HPI NEW patient referral for consultation by Dr.P Jennifer Mccarthy for a new RIGHT breast cancer diagnosis. The patient is here to discuss her breast surgical options. Her breast cancer was seen on an annual mammogram that led to a bilateral biopsy on both the RIGHT and LEFT breast. The LEFT breast was benign and the RIGHT breast is positive for IDC. The patient denies any nipple inversion or discharge. Obstetric History No data available Obstetric Comments Menarche:  10. LMP: . # of Children:  3. Age at Delivery of First Child:  32.   Hysterectomy/oophorectomy: no/no. Breast Bx: no.  Hx of Breast Feeding:  no. BCP:  [facility]. Hormone therapy:  yes Family history - Mother - breast cancer unsure of age and survived. Maternal grandmother - liver/ovarian cancer age unknown and is . 5 Maternal Aunts with breast cancer, unsure of ages and all survived the breast cancer. 2 Maternal Aunts that had breast cancer also had ovarian and colon cancer 2 Paternal aunts had breast cancer, unsure of age and survived Mammogram 3 D and ultrasound - BI RADS 5 That led to a bilateral breast biopsy. LEFT breast benign and RIGHT breast invasive ductal. 
Pathology - Invasive ductal carcinoma RIGHT breast 1.2 cm ER/IL + HER 2 - Past Medical History:  
Diagnosis Date  Arthritis  Hypercholesterolemia  Hypertension  Other ill-defined conditions(799.89) dog bite dec 2012 needeing blood transfusion  Psychiatric disorder   
 depression Past Surgical History: I, Nubia So, give verbal consent for a resident to be present in today's visit.     Bariatric Follow-up    57 year old  female BMI:Body mass index is 30.85 kg/m .    Weight:   Wt Readings from Last 1 Encounters:   07/25/23 84.1 kg (185 lb 6.4 oz)    pounds    Comorbidities:  Patient Active Problem List   Diagnosis    Urinary Tract Infection    Alcohol Abuse - Episodic    Depression With Anxiety    Hypercholesterolemia    Anxiety Disorder NOS    Arthritis    Dyslipidemia    Knee pain    Obesity (BMI 30.0-34.9)    Moderate major depression (H)    Benign neoplasm of ascending colon    Morbid obesity (H)         Interim: Maintaining a 4# weight loss. Feels phentermine is no longer working. Stopped wine altogether (though didn't drink much), exercising more. Frustrated that efforts are not leading to more weight loss. On lipitor and Dr. Stewart is following elevated TSH. Son's wedding is coming up in November. Does not want to buy her dress yet as would like to be smaller.     Plan:  DIET  Continue 3 meals. Eliminating snacking between meals can have long term impact. RD guidance and serial follow up will be helpful   EXERCISE 3 times a week at the gym and counting steps    PHARMACOTHERAPY Discussed all FDA approved medications for weight loss. She will continue phentermine, add naltrexone 1/2 tab with dinner. Again checked and no coverage for GLP-1 RA meds.     Encouraged to eat 3 meals, protein fist, RD follow up     -We reviewed her medications and whether associated with weight gain.    Current Outpatient Medications:     atorvastatin (LIPITOR) 20 MG tablet, Take 1 tablet (20 mg) by mouth daily, Disp: 90 tablet, Rfl: 3    buPROPion (WELLBUTRIN XL) 300 MG 24 hr tablet, Take 1 tablet (300 mg) by mouth every morning, Disp: 90 tablet, Rfl: 3    multivitamin therapeutic (THERAGRAN) tablet, [MULTIVITAMIN THERAPEUTIC (THERAGRAN) TABLET] Take 1 tablet by mouth daily., Disp: , Rfl:     naltrexone (DEPADE/REVIA) 50  MG tablet, Take 1/2 tablet with evening meal, Disp: 45 tablet, Rfl: 3    phentermine (ADIPEX-P) 37.5 MG tablet, Take 0.5-1 tablets (18.75-37.5 mg) by mouth every morning (before breakfast) for 180 days, Disp: 90 tablet, Rfl: 1    venlafaxine (EFFEXOR XR) 75 MG 24 hr capsule, Take 2 capsules (150 mg) by mouth daily, Disp: 180 capsule, Rfl: 3      We discussed [facility] Bariatric Basics including:  -eating 3 meals daily  -eating protein first  -eating slowly, chewing food well  -avoiding/limiting calorie containing beverages  -choosing wheat, not white with breads, crackers, pastas, jennifer, bagels, tortillas, rice  -limiting restaurant or cafeteria eating to twice a week or less  -We discussed the importance of restorative sleep and stress management in maintaining a healthy weight.  -We discussed insulin resistance and glycemic index as it relates to appetite and weight control  -We discussed the National Weight Control Registry healthy weight maintenance strategies and ways to optimize metabolism.  -We discussed the importance of physical activity including cardiovascular and strength training in maintaining a healthier weight and explored viable options.    Most recent labs:  Lab Results   Component Value Date    WBC 3.9 (L) 02/10/2023    HGB 15.0 02/10/2023    HCT 44.7 02/10/2023    MCV 90 02/10/2023     02/10/2023     Lab Results   Component Value Date    CHOL 256 (H) 02/10/2023     Lab Results   Component Value Date    HDL 54 02/10/2023     No components found for: LDLCALC  Lab Results   Component Value Date    TRIG 164 (H) 02/10/2023     Lab Results   Component Value Date    ALT 39 (H) 02/10/2023    AST 41 (H) 02/10/2023    ALKPHOS 78 02/10/2023     Lab Results   Component Value Date    TSH 5.94 (H) 02/10/2023         DIETARY HISTORY  Meals Per Day: 3  Eating Protein First?: yes  Food Diary: B:yogurt with raspberries WW toastL:veggies with hummus,  D:gyros   Snacks Per Day: yes  Typical Snack: pretzels,  Procedure Laterality Date [facility] ORTHOPAEDIC  2011 RIGHT TOTAL KNEE ARTHROPLASTY Social History Social History  Marital status:  Spouse name: N/A  
 Number of children: N/A  
 Years of education: N/A Occupational History  Not on file. Social History Main Topics  Smoking status: Former Smoker Packs/day: 0.25 Years: 2.00 Quit date: 6/4/1972  Smokeless tobacco: Never Used  Alcohol use No  
 Drug use: No  
 Sexual activity: Not on file Other Topics Concern  Not on file Social History Narrative OB History Obstetric Comments Menarche:  10. LMP: 1990. # of Children:  3. Age at Delivery of First Child:  32.   Hysterectomy/oophorectomy: no/no. Breast Bx: no.  Hx of Breast Feeding:  no. BCP:  [address]. Hormone therapy:  yes Current Outpatient Prescriptions:  
  cholecalciferol (VITAMIN D3) 1,000 unit cap, Take 1 Tab by mouth., Disp: , Rfl:  
  lisinopril-hydroCHLOROthiazide (PRINZIDE, ZESTORETIC) 20-25 mg per tablet, Take 1 Tab by mouth., Disp: , Rfl:  
  senna-docusate (PERICOLACE) 8.6-50 mg per tablet, Take 1 Tab by mouth., Disp: , Rfl:  
  amLODIPine (NORVASC) 10 mg tablet, Take  by mouth daily. , Disp: , Rfl:  
  calcium-cholecalciferol, d3, (CALCIUM 600 + D) 600-125 mg-unit Tab, Take 1 Tab by mouth two (2) times a day., Disp: , Rfl:  
  glucosamine-chondroit-vit c-mn (GLUCOSAMINE 1500 COMPLEX) 500-400 mg capsule, Take 1 Cap by mouth two (2) times a day., Disp: , Rfl:  
  etodolac (LODINE) 400 mg tablet, Take 400 mg by mouth two (2) times a day., Disp: , Rfl:  
  Cholecalciferol, Vitamin D3, (VITAMIN D3) 1,000 unit cap, Take 1 Tab by mouth daily. , Disp: , Rfl:  
  escitalopram (LEXAPRO) 10 mg tablet, Take 10 mg by mouth daily. , Disp: , Rfl:  
  atenolol (TENORMIN) 50 mg tablet, Take 50 mg by mouth daily. , Disp: , Rfl:  
Allergies Allergen Reactions  Nasacort [Triamcinolone Acetonide] Other (comments) "cake  Fluid Intake: water, diet coke or diet rootbeer, [place]  Portion Control: improved  Calorie Containing Beverages: no  Alcohol per week: no  Typical Protein Food Choices: 50:50  cooks  Choosing Whole Grains: yes  Grocery Shopping is done by: shared  Food Preparation is done by: shared  Meals at Restaurant/Cafeteria/Take Out Per Week: <3  Eating at the Table: yes  TV is Off During Meals: yes    Positive Changes Since Last Visit: mindfulness  Struggling With: weight los    Knowledgeable in Reading Food Labels: yes  Getting Adequate Protein: yes  Sleeping 7-8 hours/day wakes up at 1:30 am and walks around  Stress management high    PHYSICAL ACTIVITY PATTERNS:  Cardiovascular: walks the dog 3-4X/wk -office gym and [employer]  Strength Training: [employer]    REVIEW OF SYSTEMS  Stress a little high  7 hours sleep/interrupted  PHYSICAL EXAM: (most recent vitals and today's stated weight)  Vitals: /72 (BP Location: Right arm, Patient Position: Sitting, Cuff Size: Adult Small)   Ht 1.651 m (5' 5\")   Wt 84.1 kg (185 lb 6.4 oz)   BMI 30.85 kg/m        GEN: Pleasant and in no acute distress  PSYCH: A&OX3,     I have reviewed the note as documented above.  This accurately captures the substance of my conversation with the patient.  Thank you for the opportunity to participate in the care of your patient.    Kay Bateman MD, FAAFP  [facility]  Diplomate, American Board of Obesity Medicine    Total time spent on the date of this encounter doing: chart review, review of test results, patient visit, physical exam, education, counseling, developing plan of care, and documenting = 30 minutes.      " headache  Sulfa (Sulfonamide Antibiotics) Rash Review of Systems Constitutional: Negative. HENT: Negative. Eyes: Negative. Respiratory: Negative. Cardiovascular: Positive for claudication. Gastrointestinal: Negative. Genitourinary: Positive for frequency. Musculoskeletal: Positive for joint pain and myalgias. Skin: Negative. Neurological: Negative. Endo/Heme/Allergies: Negative. Psychiatric/Behavioral: Negative. Physical Exam  
Constitutional: She is oriented to person, place, and time. She appears well-developed and well-nourished. HENT:  
Head: Normocephalic. Eyes: EOM are normal.  
Neck: Neck supple. No thyromegaly present. Cardiovascular: Normal rate, regular rhythm, normal heart sounds and intact distal pulses. Pulmonary/Chest: Effort normal and breath sounds normal. Right breast exhibits no inverted nipple, no mass, no nipple discharge, no skin change and no tenderness. Left breast exhibits no inverted nipple, no mass, no nipple discharge, no skin change and no tenderness. Breasts are symmetrical.  
Right breast us 12:00 shows mass and clip Abdominal: Soft. Bowel sounds are normal.  
Musculoskeletal: Normal range of motion. Lymphadenopathy:  
  She has no cervical adenopathy. She has no axillary adenopathy. Neurological: She is alert and oriented to person, place, and time. Skin: Skin is warm and dry. Psychiatric: She has a normal mood and affect. Nursing note and vitals reviewed. ASSESSMENT and PLAN 
  ICD-10-CM ICD-9-CM 1. Malignant neoplasm of right breast in female, estrogen receptor positive, unspecified site of breast ([facility] C50.911 174.9 BRAC-ANALYSIS  
 Z17.0 V86.0   
 
76 yo female with right breast T1 (1.7 cm) N0 IDC grade 2 Er/Pr+ her 2 vicki negative. She is here with her . I have reviewed the imaging and pathology with her and she was given copies of these reports. 90 minutes were spent face-to-face with the patient during this encounter and 90% of that time was spent on counseling and coordination of care. 1. Discussed lumpectomy and radiation vs mastectomy. Discussed reconstruction. Patient claustrophobic will not do mri. 2. Discussed sentinel lymph node biopsy. 3. Discussed external beam radiation. 4. Discussed hormone therapy. 5. Discussed the possibility of chemotherapy. She is a good candidate for right breast us guided lumpectomy, sln biopsy. The procedure and risks were discussed. Risks include bleeding, bruising, scar, infection, need for more surgery and lymphedema. She understood and wished to proceed . Will also send gene panel today. If you have questions, please do not hesitate to call me. I look forward to following Ms. Emily Hannaillo along with you. Sincerely, Francisca Navarro MD

## 2023-07-25 NOTE — LETTER
7/25/2023         RE: Nubia So  [address]  [place] [address]        Dear Colleague,    Thank you for referring your patient, Nubia So, to the [facility] [place]. Please see a copy of my visit note below.    I, Nubia So, give verbal consent for a resident to be present in today's visit.     Bariatric Follow-up    57 year old  female BMI:Body mass index is 30.85 kg/m .    Weight:   Wt Readings from Last 1 Encounters:   07/25/23 84.1 kg (185 lb 6.4 oz)    pounds    Comorbidities:  Patient Active Problem List   Diagnosis     Urinary Tract Infection     Alcohol Abuse - Episodic     Depression With Anxiety     Hypercholesterolemia     Anxiety Disorder NOS     Arthritis     Dyslipidemia     Knee pain     Obesity (BMI 30.0-34.9)     Moderate major depression (H)     Benign neoplasm of ascending colon     Morbid obesity (H)         Interim: Maintaining a 4# weight loss. Feels phentermine is no longer working. Stopped wine altogether (though didn't drink much), exercising more. Frustrated that efforts are not leading to more weight loss. On lipitor and Dr. Stewart is following elevated TSH. Son's wedding is coming up in November. Does not want to buy her dress yet as would like to be smaller.     Plan:  DIET  Continue 3 meals. Eliminating snacking between meals can have long term impact. RD guidance and serial follow up will be helpful   EXERCISE 3 times a week at the gym and counting steps    PHARMACOTHERAPY Discussed all FDA approved medications for weight loss. She will continue phentermine, add naltrexone 1/2 tab with dinner. Again checked and no coverage for GLP-1 RA meds.     Encouraged to eat 3 meals, protein fist, RD follow up     -We reviewed her medications and whether associated with weight gain.    Current Outpatient Medications:      atorvastatin (LIPITOR) 20 MG tablet, Take 1 tablet (20 mg) by mouth daily, Disp: 90 tablet, Rfl: 3      buPROPion (WELLBUTRIN XL) 300 MG 24 hr tablet, Take 1 tablet (300 mg) by mouth every morning, Disp: 90 tablet, Rfl: 3     multivitamin therapeutic (THERAGRAN) tablet, [MULTIVITAMIN THERAPEUTIC (THERAGRAN) TABLET] Take 1 tablet by mouth daily., Disp: , Rfl:      naltrexone (DEPADE/REVIA) 50 MG tablet, Take 1/2 tablet with evening meal, Disp: 45 tablet, Rfl: 3     phentermine (ADIPEX-P) 37.5 MG tablet, Take 0.5-1 tablets (18.75-37.5 mg) by mouth every morning (before breakfast) for 180 days, Disp: 90 tablet, Rfl: 1     venlafaxine (EFFEXOR XR) 75 MG 24 hr capsule, Take 2 capsules (150 mg) by mouth daily, Disp: 180 capsule, Rfl: 3      We discussed [facility] Bariatric Basics including:  -eating 3 meals daily  -eating protein first  -eating slowly, chewing food well  -avoiding/limiting calorie containing beverages  -choosing wheat, not white with breads, crackers, pastas, jennifer, bagels, tortillas, rice  -limiting restaurant or cafeteria eating to twice a week or less  -We discussed the importance of restorative sleep and stress management in maintaining a healthy weight.  -We discussed insulin resistance and glycemic index as it relates to appetite and weight control  -We discussed the National Weight Control Registry healthy weight maintenance strategies and ways to optimize metabolism.  -We discussed the importance of physical activity including cardiovascular and strength training in maintaining a healthier weight and explored viable options.    Most recent labs:  Lab Results   Component Value Date    WBC 3.9 (L) 02/10/2023    HGB 15.0 02/10/2023    HCT 44.7 02/10/2023    MCV 90 02/10/2023     02/10/2023     Lab Results   Component Value Date    CHOL 256 (H) 02/10/2023     Lab Results   Component Value Date    HDL 54 02/10/2023     No components found for: LDLCALC  Lab Results   Component Value Date    TRIG 164 (H) 02/10/2023     Lab Results   Component Value Date    ALT 39 (H) 02/10/2023    AST 41 (H)  "02/10/2023    ALKPHOS 78 02/10/2023     Lab Results   Component Value Date    TSH 5.94 (H) 02/10/2023         DIETARY HISTORY  Meals Per Day: 3  Eating Protein First?: yes  Food Diary: B:yogurt with raspberries WW toastL:veggies with hummus,  D:gyros   Snacks Per Day: yes  Typical Snack: pretzels, cake  Fluid Intake: water, diet coke or diet rootbeer, [place]  Portion Control: improved  Calorie Containing Beverages: no  Alcohol per week: no  Typical Protein Food Choices: 50:50  cooks  Choosing Whole Grains: yes  Grocery Shopping is done by: shared  Food Preparation is done by: shared  Meals at Restaurant/Cafeteria/Take Out Per Week: <3  Eating at the Table: yes  TV is Off During Meals: yes    Positive Changes Since Last Visit: mindfulness  Struggling With: weight los    Knowledgeable in Reading Food Labels: yes  Getting Adequate Protein: yes  Sleeping 7-8 hours/day wakes up at 1:30 am and walks around  Stress management high    PHYSICAL ACTIVITY PATTERNS:  Cardiovascular: walks the dog 3-4X/wk -office gym and [employer]  Strength Training: [employer]    REVIEW OF SYSTEMS  Stress a little high  7 hours sleep/interrupted  PHYSICAL EXAM: (most recent vitals and today's stated weight)  Vitals: /72 (BP Location: Right arm, Patient Position: Sitting, Cuff Size: Adult Small)   Ht 1.651 m (5' 5\")   Wt 84.1 kg (185 lb 6.4 oz)   BMI 30.85 kg/m        GEN: Pleasant and in no acute distress  PSYCH: A&OX3,     I have reviewed the note as documented above.  This accurately captures the substance of my conversation with the patient.  Thank you for the opportunity to participate in the care of your patient.    Kay Bateman MD, FAAFP  [facility]  Diplomate, American Board of Obesity Medicine    Total time spent on the date of this encounter doing: chart review, review of test results, patient visit, physical exam, education, counseling, developing plan of care, and documenting = 30 " minutes.        Again, thank you for allowing me to participate in the care of your patient.        Sincerely,        Kay Bateman MD

## 2023-08-16 ENCOUNTER — LAB (OUTPATIENT)
Dept: LAB | Facility: CLINIC | Age: 58
End: 2023-08-16
Payer: COMMERCIAL

## 2023-08-16 DIAGNOSIS — R79.89 ELEVATED TSH: ICD-10-CM

## 2023-08-16 DIAGNOSIS — E78.00 PURE HYPERCHOLESTEROLEMIA: ICD-10-CM

## 2023-08-16 DIAGNOSIS — R74.8 ELEVATED LIVER ENZYMES: ICD-10-CM

## 2023-08-16 DIAGNOSIS — R73.09 ELEVATED GLUCOSE: ICD-10-CM

## 2023-08-16 LAB
ALBUMIN SERPL BCG-MCNC: 4.5 G/DL (ref 3.5–5.2)
ALP SERPL-CCNC: 78 U/L (ref 35–104)
ALT SERPL W P-5'-P-CCNC: 30 U/L (ref 0–50)
ANION GAP SERPL CALCULATED.3IONS-SCNC: 11 MMOL/L (ref 7–15)
AST SERPL W P-5'-P-CCNC: 23 U/L (ref 0–45)
BASOPHILS # BLD AUTO: 0 10E3/UL (ref 0–0.2)
BASOPHILS NFR BLD AUTO: 1 %
BILIRUB SERPL-MCNC: 0.2 MG/DL
BUN SERPL-MCNC: 20.6 MG/DL (ref 6–20)
CALCIUM SERPL-MCNC: 9.4 MG/DL (ref 8.6–10)
CHLORIDE SERPL-SCNC: 107 MMOL/L (ref 98–107)
CHOLEST SERPL-MCNC: 199 MG/DL
CREAT SERPL-MCNC: 0.78 MG/DL (ref 0.51–0.95)
DEPRECATED HCO3 PLAS-SCNC: 24 MMOL/L (ref 22–29)
EOSINOPHIL # BLD AUTO: 0.3 10E3/UL (ref 0–0.7)
EOSINOPHIL NFR BLD AUTO: 6 %
ERYTHROCYTE [DISTWIDTH] IN BLOOD BY AUTOMATED COUNT: 13.6 % (ref 10–15)
GFR SERPL CREATININE-BSD FRML MDRD: 88 ML/MIN/1.73M2
GLUCOSE SERPL-MCNC: 123 MG/DL (ref 70–99)
HCT VFR BLD AUTO: 42.4 % (ref 35–47)
HDLC SERPL-MCNC: 48 MG/DL
HGB BLD-MCNC: 14 G/DL (ref 11.7–15.7)
IMM GRANULOCYTES # BLD: 0 10E3/UL
IMM GRANULOCYTES NFR BLD: 0 %
LDLC SERPL CALC-MCNC: 123 MG/DL
LYMPHOCYTES # BLD AUTO: 2.1 10E3/UL (ref 0.8–5.3)
LYMPHOCYTES NFR BLD AUTO: 44 %
MCH RBC QN AUTO: 30.2 PG (ref 26.5–33)
MCHC RBC AUTO-ENTMCNC: 33 G/DL (ref 31.5–36.5)
MCV RBC AUTO: 91 FL (ref 78–100)
MONOCYTES # BLD AUTO: 0.4 10E3/UL (ref 0–1.3)
MONOCYTES NFR BLD AUTO: 8 %
NEUTROPHILS # BLD AUTO: 2 10E3/UL (ref 1.6–8.3)
NEUTROPHILS NFR BLD AUTO: 42 %
NONHDLC SERPL-MCNC: 151 MG/DL
PLATELET # BLD AUTO: 286 10E3/UL (ref 150–450)
POTASSIUM SERPL-SCNC: 4.7 MMOL/L (ref 3.4–5.3)
PROT SERPL-MCNC: 6.9 G/DL (ref 6.4–8.3)
RBC # BLD AUTO: 4.64 10E6/UL (ref 3.8–5.2)
SODIUM SERPL-SCNC: 142 MMOL/L (ref 136–145)
T4 FREE SERPL-MCNC: 1.01 NG/DL (ref 0.9–1.7)
TRIGL SERPL-MCNC: 138 MG/DL
TSH SERPL DL<=0.005 MIU/L-ACNC: 11.1 UIU/ML (ref 0.3–4.2)
WBC # BLD AUTO: 4.8 10E3/UL (ref 4–11)

## 2023-08-16 PROCEDURE — 83036 HEMOGLOBIN GLYCOSYLATED A1C: CPT

## 2023-08-16 PROCEDURE — 36415 COLL VENOUS BLD VENIPUNCTURE: CPT

## 2023-08-16 PROCEDURE — 80053 COMPREHEN METABOLIC PANEL: CPT

## 2023-08-16 PROCEDURE — 84443 ASSAY THYROID STIM HORMONE: CPT

## 2023-08-16 PROCEDURE — 85025 COMPLETE CBC W/AUTO DIFF WBC: CPT

## 2023-08-16 PROCEDURE — 80061 LIPID PANEL: CPT

## 2023-08-16 PROCEDURE — 84439 ASSAY OF FREE THYROXINE: CPT

## 2023-08-18 DIAGNOSIS — E03.9 HYPOTHYROIDISM, UNSPECIFIED TYPE: ICD-10-CM

## 2023-08-18 DIAGNOSIS — R73.09 ELEVATED GLUCOSE: ICD-10-CM

## 2023-08-18 DIAGNOSIS — E78.00 PURE HYPERCHOLESTEROLEMIA: Primary | ICD-10-CM

## 2023-08-18 RX ORDER — LEVOTHYROXINE SODIUM 50 UG/1
50 TABLET ORAL DAILY
Qty: 30 TABLET | Refills: 3 | Status: SHIPPED | OUTPATIENT
Start: 2023-08-18 | End: 2023-12-15

## 2023-08-21 LAB — HBA1C MFR BLD: 6 % (ref 0–5.6)

## 2023-08-28 DIAGNOSIS — E66.9 OBESITY (BMI 30-39.9): ICD-10-CM

## 2023-08-28 DIAGNOSIS — E66.811 OBESITY (BMI 30.0-34.9): Primary | ICD-10-CM

## 2023-08-28 RX ORDER — SEMAGLUTIDE 1 MG/.5ML
1 INJECTION, SOLUTION SUBCUTANEOUS WEEKLY
Qty: 2 ML | Refills: 0 | Status: SHIPPED | OUTPATIENT
Start: 2023-08-28 | End: 2023-09-25

## 2023-08-28 RX ORDER — SEMAGLUTIDE 0.5 MG/.5ML
0.5 INJECTION, SOLUTION SUBCUTANEOUS WEEKLY
Qty: 2 ML | Refills: 0 | Status: SHIPPED | OUTPATIENT
Start: 2023-08-28 | End: 2023-09-25

## 2023-08-28 RX ORDER — SEMAGLUTIDE 0.25 MG/.5ML
0.25 INJECTION, SOLUTION SUBCUTANEOUS WEEKLY
Qty: 2 ML | Refills: 0 | Status: SHIPPED | OUTPATIENT
Start: 2023-08-28 | End: 2023-09-25

## 2023-09-11 DIAGNOSIS — E03.9 HYPOTHYROIDISM, UNSPECIFIED TYPE: ICD-10-CM

## 2023-09-12 RX ORDER — LEVOTHYROXINE SODIUM 50 UG/1
50 TABLET ORAL DAILY
Qty: 90 TABLET | Refills: 1 | OUTPATIENT
Start: 2023-09-12

## 2023-09-16 ENCOUNTER — TELEPHONE (OUTPATIENT)
Dept: FAMILY MEDICINE | Facility: CLINIC | Age: 58
End: 2023-09-16
Payer: COMMERCIAL

## 2023-09-16 NOTE — TELEPHONE ENCOUNTER
Medication Question or Refill        What medication are you calling about (include dose and sig)?: levothyroxine (SYNTHROID/LEVOTHROID) 50 MCG tablet     Preferred Pharmacy:   [place] [address] [place] [facility], [place] - [address]  [address] [place] [address] E  [facility] [address]  Phone: [phone] Fax: [phone]      Controlled Substance Agreement on file:   CSA -- Patient Level:    CSA: None found at the patient level.       Who prescribed the medication?: Dr. Villar    Do you need a refill? Yes    When did you use the medication last? 9/15    Patient offered an appointment? No    Do you have any questions or concerns?  Yes: Pt was told by pharmacy that her refills are not active       Could we send this information to you in [facility] or would you prefer to receive a phone call?:   Patient would prefer a phone call   Okay to leave a detailed message?: Yes at Home number on file [phone] (home)

## 2023-09-17 ENCOUNTER — MYC MEDICAL ADVICE (OUTPATIENT)
Dept: FAMILY MEDICINE | Facility: CLINIC | Age: 58
End: 2023-09-17
Payer: COMMERCIAL

## 2023-09-18 NOTE — TELEPHONE ENCOUNTER
Called [facility],    They report no issues filling levothyroxine. They will fill today. Notifying patient via [email or web address].    Don Cavrer RN     [facility]

## 2023-09-29 ENCOUNTER — LAB (OUTPATIENT)
Dept: LAB | Facility: CLINIC | Age: 58
End: 2023-09-29
Payer: COMMERCIAL

## 2023-09-29 DIAGNOSIS — E03.9 HYPOTHYROIDISM, UNSPECIFIED TYPE: ICD-10-CM

## 2023-09-29 LAB — TSH SERPL DL<=0.005 MIU/L-ACNC: 3.68 UIU/ML (ref 0.3–4.2)

## 2023-09-29 PROCEDURE — 36415 COLL VENOUS BLD VENIPUNCTURE: CPT

## 2023-09-29 PROCEDURE — 84443 ASSAY THYROID STIM HORMONE: CPT

## 2023-10-02 ENCOUNTER — DOCUMENTATION ONLY (OUTPATIENT)
Dept: FAMILY MEDICINE | Facility: CLINIC | Age: 58
End: 2023-10-02
Payer: COMMERCIAL

## 2023-10-02 NOTE — PROGRESS NOTES
Nubia So has an upcoming lab appointment:    Future Appointments   Date Time Provider Department Center   10/11/2023  9:00 AM [facility] LAB [facility]   10/23/2023 11:10 AM Royal Stewrat MD [facility]   [facility]25/2023  9:30 [facility]     Patient is scheduled for the following lab(s):     Patient wants to recheck thyroid after starting medication.     There is no order available. Please review and place either future orders or HMPO (Review of Health Maintenance Protocol Orders), as appropriate.    There are no preventive care reminders to display for this patient.    Alison Cisse

## 2023-10-03 NOTE — TELEPHONE ENCOUNTER
Spoke to patient to explain that she does not need lab visit as she I scheduled with Dr SOTO 10/23/2023. Patient agrees and appt is canceled.

## 2023-10-16 DIAGNOSIS — E66.811 CLASS 1 OBESITY WITH BODY MASS INDEX (BMI) OF 30.0 TO 30.9 IN ADULT, UNSPECIFIED OBESITY TYPE, UNSPECIFIED WHETHER SERIOUS COMORBIDITY PRESENT: ICD-10-CM

## 2023-10-19 RX ORDER — PHENTERMINE HYDROCHLORIDE 37.5 MG/1
18.75-37.5 TABLET ORAL
Qty: 90 TABLET | Refills: 1 | Status: SHIPPED | OUTPATIENT
Start: 2023-10-19 | End: 2024-02-20

## 2023-10-23 ENCOUNTER — OFFICE VISIT (OUTPATIENT)
Dept: FAMILY MEDICINE | Facility: CLINIC | Age: 58
End: 2023-10-23
Payer: COMMERCIAL

## 2023-10-23 VITALS
OXYGEN SATURATION: 97 % | BODY MASS INDEX: 30.62 KG/M2 | HEART RATE: 111 BPM | WEIGHT: 183.8 LBS | HEIGHT: 65 IN | RESPIRATION RATE: 18 BRPM | TEMPERATURE: 99.2 F | DIASTOLIC BLOOD PRESSURE: 78 MMHG | SYSTOLIC BLOOD PRESSURE: 106 MMHG

## 2023-10-23 DIAGNOSIS — E78.00 PURE HYPERCHOLESTEROLEMIA: ICD-10-CM

## 2023-10-23 DIAGNOSIS — E03.9 HYPOTHYROIDISM, UNSPECIFIED TYPE: ICD-10-CM

## 2023-10-23 DIAGNOSIS — Z20.822 EXPOSURE TO 2019 NOVEL CORONAVIRUS: ICD-10-CM

## 2023-10-23 DIAGNOSIS — F32.1 MODERATE MAJOR DEPRESSION (H): Primary | ICD-10-CM

## 2023-10-23 DIAGNOSIS — F34.1 DYSTHYMIC DISORDER: ICD-10-CM

## 2023-10-23 DIAGNOSIS — R73.03 PREDIABETES: ICD-10-CM

## 2023-10-23 PROBLEM — E66.01 MORBID OBESITY (H): Status: RESOLVED | Noted: 2023-02-10 | Resolved: 2023-10-23

## 2023-10-23 PROCEDURE — 87635 SARS-COV-2 COVID-19 AMP PRB: CPT | Performed by: FAMILY MEDICINE

## 2023-10-23 PROCEDURE — 99214 OFFICE O/P EST MOD 30 MIN: CPT | Performed by: FAMILY MEDICINE

## 2023-10-23 RX ORDER — BUPROPION HYDROCHLORIDE 300 MG/1
300 TABLET ORAL EVERY MORNING
Qty: 90 TABLET | Refills: 3 | Status: SHIPPED | OUTPATIENT
Start: 2023-10-23 | End: 2024-09-23

## 2023-10-23 ASSESSMENT — PATIENT HEALTH QUESTIONNAIRE - PHQ9
SUM OF ALL RESPONSES TO PHQ QUESTIONS 1-9: 3
10. IF YOU CHECKED OFF ANY PROBLEMS, HOW DIFFICULT HAVE THESE PROBLEMS MADE IT FOR YOU TO DO YOUR WORK, TAKE CARE OF THINGS AT HOME, OR GET ALONG WITH OTHER PEOPLE: SOMEWHAT DIFFICULT
SUM OF ALL RESPONSES TO PHQ QUESTIONS 1-9: 3

## 2023-10-23 NOTE — PROGRESS NOTES
"  Assessment & Plan     ICD-10-CM    1. Moderate major depression (H)  F32.1       2. Dysthymic disorder  F34.1 buPROPion (WELLBUTRIN XL) 300 MG 24 hr tablet      3. Hypercholesterolemia  E78.00       4. Hypothyroidism, unspecified type  E03.9       5. Exposure to 2019 novel coronavirus  Z20.822 Asymptomatic COVID-19 Virus (Coronavirus) by PCR Nasopharyngeal      6. Prediabetes  R73.03 Hemoglobin A1c        Here today for follow-up.  Continues to feel tired.  Feels most of it is coming from social situation.  Feels  and younger son consume alcohol a lot.  She herself had a DWI and was in nursing home for 4 days.  Since then she informs me that she has stopped drinking.  However the family continues to drink it and this is distressing for her.  She is taking her cholesterol medication regularly.  She is taking her thyroid medication regularly.  Recent lab work has been stable.  She was noted to have prediabetes and since then has been working on weight loss.  She does have a prescription of phentermine but does not use it regularly because she does not like the way it makes her feel.  She does have a prescription of naltrexone.  She is also following with a counselor and is learning to take care of herself.  At this time she will continue her current management.  She had exposure to COVID 2 days ago.  Noted low temperature.  Denies any other significant signs and symptoms.  Check COVID labs as above.     BMI:   Estimated body mass index is 30.59 kg/m  as calculated from the following:    Height as of this encounter: 1.651 m (5' 5\").    Weight as of this encounter: 83.4 kg (183 lb 12.8 oz).   Weight management plan: Discussed healthy diet and exercise guidelines    MEDICATIONS:  Continue current medications without change  Work on weight loss  Regular exercise    Royal Stewart MD  [facility]    Adrian Delacruz is a 58 year old, presenting for the following health issues:  RECHECK " (Follow up- discuss results. Pt mentioned she is still feeling tired. Pt does know that it takes a while. Pt just also was informed that someone at work tested positive for covid. )        10/23/2023    11:01 AM   Additional Questions   Roomed by Yajaira Quigley CMA       History of Present Illness       Hyperlipidemia:  She presents for follow up of hyperlipidemia.   She is taking medication to lower cholesterol. She is not having myalgia or other side effects to statin medications.    Hypothyroidism:     Since last visit, patient describes the following symptoms::  Dry skin, Fatigue, Hair loss and Weight loss    Weight loss::  No weight loss    She eats 2-3 servings of fruits and vegetables daily.She consumes 0 sweetened beverage(s) daily.She exercises with enough effort to increase her heart rate 20 to 29 minutes per day.  She exercises with enough effort to increase her heart rate 4 days per week.   She is taking medications regularly.     Patient Active Problem List   Diagnosis    Urinary Tract Infection    Alcohol Abuse - Episodic    Depression With Anxiety    Hypercholesterolemia    Anxiety Disorder NOS    Arthritis    Dyslipidemia    Knee pain    Obesity (BMI 30.0-34.9)    Moderate major depression (H)    Benign neoplasm of ascending colon    Hypothyroidism, unspecified type     Current Outpatient Medications   Medication    atorvastatin (LIPITOR) 20 MG tablet    buPROPion (WELLBUTRIN XL) 300 MG 24 hr tablet    levothyroxine (SYNTHROID/LEVOTHROID) 50 MCG tablet    multivitamin therapeutic (THERAGRAN) tablet    naltrexone (DEPADE/REVIA) 50 MG tablet    phentermine (ADIPEX-P) 37.5 MG tablet    venlafaxine (EFFEXOR XR) 75 MG 24 hr capsule     No current facility-administered medications for this visit.         Review of Systems   Constitutional, HEENT, cardiovascular, pulmonary, gi and gu systems are negative, except as otherwise noted.      Objective    /78 (BP Location: Left arm, Patient Position:  "Sitting, Cuff Size: Adult Large)   Pulse 111   Temp 99.2  F (37.3  C) (Oral)   Resp 18   Ht 1.651 m (5' 5\")   Wt 83.4 kg (183 lb 12.8 oz)   SpO2 97%   BMI 30.59 kg/m    Body mass index is 30.59 kg/m .  Physical Exam   GENERAL: healthy, alert and no distress    Lab on 09/29/2023   Component Date Value Ref Range Status    TSH 09/29/2023 3.68  0.30 - 4.20 uIU/mL Final     No results found for any visits on 10/23/23.                  "

## 2023-10-24 LAB — SARS-COV-2 RNA RESP QL NAA+PROBE: NEGATIVE

## 2023-10-25 ENCOUNTER — OFFICE VISIT (OUTPATIENT)
Dept: SURGERY | Facility: CLINIC | Age: 58
End: 2023-10-25
Payer: COMMERCIAL

## 2023-10-25 VITALS
SYSTOLIC BLOOD PRESSURE: 132 MMHG | BODY MASS INDEX: 30.66 KG/M2 | WEIGHT: 184 LBS | DIASTOLIC BLOOD PRESSURE: 74 MMHG | HEIGHT: 65 IN

## 2023-10-25 DIAGNOSIS — E66.811 CLASS 1 OBESITY WITH BODY MASS INDEX (BMI) OF 30.0 TO 30.9 IN ADULT, UNSPECIFIED OBESITY TYPE, UNSPECIFIED WHETHER SERIOUS COMORBIDITY PRESENT: Primary | ICD-10-CM

## 2023-10-25 PROCEDURE — 99214 OFFICE O/P EST MOD 30 MIN: CPT | Performed by: FAMILY MEDICINE

## 2023-10-25 NOTE — LETTER
10/25/2023         RE: Nubia So  [address]  [place] [address]        Dear Colleague,    Thank you for referring your patient, Nubia So, to the [facility] [place]. Please see a copy of my visit note below.    Bariatric Follow-up    58 year old  female BMI:Body mass index is 30.62 kg/m .    Weight:   Wt Readings from Last 1 Encounters:   10/25/23 83.5 kg (184 lb)    pounds      Comorbidities:  Patient Active Problem List   Diagnosis     Urinary Tract Infection     Alcohol Abuse - Episodic     Depression With Anxiety     Hypercholesterolemia     Anxiety Disorder NOS     Arthritis     Dyslipidemia     Knee pain     Obesity (BMI 30.0-34.9)     Moderate major depression (H)     Benign neoplasm of ascending colon     Hypothyroidism, unspecified type       Interim: Initial weight 189# BMI 31.45. Has worked with Ara Cifuentes RD. Phentermine, naltrexone and wellbutrin have been used. Maintaining a 5# weight loss. Son's wedding coming up and grandchild due in December.  Taking a break from phentermine currently. Just filled her RX. Exercising more. Elliptical now with strength training. 2-3 times a week. Walks her dog daily. 5-10K. A1c is up to 6.0. Stress is high. Got a bad review at work. Started levothyroxine for TSH 11 and feels a little better. Still feels foggy-not sleeping well-interrupted worse. Perimenopausal symptoms persist. Forgets naltrexone in the evening    Plan:  DIET  try to get adequate breakfast and lunch. Discussed one word foods   EXERCISE stay consistent   PHARMACOTHERAPY continue phentermine and naltrexone and wellbutrin    discussed importance of managing stress and sleep.      -We reviewed her medications and whether associated with weight gain.  Current Outpatient Medications   Medication     atorvastatin (LIPITOR) 20 MG tablet     buPROPion (WELLBUTRIN XL) 300 MG 24 hr tablet     levothyroxine (SYNTHROID/LEVOTHROID) 50 MCG tablet      multivitamin therapeutic (THERAGRAN) tablet     naltrexone (DEPADE/REVIA) 50 MG tablet     venlafaxine (EFFEXOR XR) 75 MG 24 hr capsule     phentermine (ADIPEX-P) 37.5 MG tablet     No current facility-administered medications for this visit.        We discussed [facility] Bariatric Basics including:  -eating 3 meals daily  -eating protein first  -eating slowly, chewing food well  -avoiding/limiting calorie containing beverages  -choosing wheat, not white with breads, crackers, pastas, jennifer, bagels, tortillas, rice  -limiting restaurant or cafeteria eating to twice a week or less  -We discussed the importance of restorative sleep and stress management in maintaining a healthy weight.  -We discussed insulin resistance and glycemic index as it relates to appetite and weight control  -We discussed the National Weight Control Registry healthy weight maintenance strategies and ways to optimize metabolism.  -We discussed the importance of physical activity including cardiovascular and strength training in maintaining a healthier weight and explored viable options.    Most recent labs:  Lab Results   Component Value Date    WBC 4.8 08/16/2023    HGB 14.0 08/16/2023    HCT 42.4 08/16/2023    MCV 91 08/16/2023     08/16/2023     Lab Results   Component Value Date    CHOL 199 08/16/2023     Lab Results   Component Value Date    HDL 48 (L) 08/16/2023       Lab Results   Component Value Date    TRIG 138 08/16/2023       Lab Results   Component Value Date    ALT 30 08/16/2023    AST 23 08/16/2023    ALKPHOS 78 08/16/2023       Lab Results   Component Value Date    TSH 3.68 09/29/2023       DIETARY HISTORY  Meals Per Day: 2-3  Eating Protein First?: yes  Food Diary: B:yogurt greek with banana L:skips or protein bar,  D:chicken with broccoli and wild felipe  Snacks Per Day: yes  Typical Snack: fruit or cheese, popcorn  Fluid Intake: intenional  Portion Control: improved  Calorie Containing Beverages: no  Alcohol per week:  "no  Typical Protein Food Choices: mainly lean  Choosing Whole Grains: yes  Grocery Shopping is done by: herself  Food Preparation is done by: herself  Meals at Restaurant/Cafeteria/Take Out Per Week: 0-1  Eating at the Table:   TV is Off During Meals:     Positive Changes Since Last Visit: maintaining 5# weight loss despite high stress  Struggling With: stress    Knowledgeable in Reading Food Labels: yes  Getting Adequate Protein: yes  Sleeping 7-8 hours/day interrupted  Stress management problematic    PHYSICAL ACTIVITY PATTERNS:  Cardiovascular: walking the dog  Strength Trainin-3 X/wk    REVIEW OF SYSTEMS    PHYSICAL EXAM:  Vitals: /74 (BP Location: Right arm, Patient Position: Sitting, Cuff Size: Adult Small)   Ht 1.651 m (5' 5\")   Wt 83.5 kg (184 lb)   BMI 30.62 kg/m        GEN: Pleasant, well groomed, in no acute distress  EYES: EOMI,  ENT: airway patent  NECK: no carotid bruits, no anterior/supraclavicular lymphadenopathy, thyroid normal   HEART: Rhythm regular, rate regular, no murmur   LUNGS: Clear  ABDOMEN: soft, non-tender, obese, no rashes   VASCULAR: no  lower extremity edema  MUSCULOSKELETAL:  muscle mass OK  SKIN:  no color changes of venous stasis, no ulcerations    Total time spent on the date of this encounter doing: chart review, review of test results, patient visit, physical exam, education, counseling, developing plan of care, and documenting = 30 minutes.            INubia, give verbal consent for a resident to be present in today's visit.       Again, thank you for allowing me to participate in the care of your patient.        Sincerely,        Kay Bateman MD  "

## 2023-10-25 NOTE — PROGRESS NOTES
Bariatric Follow-up    58 year old  female BMI:Body mass index is 30.62 kg/m .    Weight:   Wt Readings from Last 1 Encounters:   10/25/23 83.5 kg (184 lb)    pounds      Comorbidities:  Patient Active Problem List   Diagnosis    Urinary Tract Infection    Alcohol Abuse - Episodic    Depression With Anxiety    Hypercholesterolemia    Anxiety Disorder NOS    Arthritis    Dyslipidemia    Knee pain    Obesity (BMI 30.0-34.9)    Moderate major depression (H)    Benign neoplasm of ascending colon    Hypothyroidism, unspecified type       Interim: Initial weight 189# BMI 31.45. Has worked with Ara Cifuentes RD. Phentermine, naltrexone and wellbutrin have been used. Maintaining a 5# weight loss. Son's wedding coming up and grandchild due in December.  Taking a break from phentermine currently. Just filled her RX. Exercising more. Elliptical now with strength training. 2-3 times a week. Walks her dog daily. 5-10K. A1c is up to 6.0. Stress is high. Got a bad review at work. Started levothyroxine for TSH 11 and feels a little better. Still feels foggy-not sleeping well-interrupted worse. Perimenopausal symptoms persist. Forgets naltrexone in the evening    Plan:  DIET  try to get adequate breakfast and lunch. Discussed one word foods   EXERCISE stay consistent   PHARMACOTHERAPY continue phentermine and naltrexone and wellbutrin    discussed importance of managing stress and sleep.      -We reviewed her medications and whether associated with weight gain.  Current Outpatient Medications   Medication    atorvastatin (LIPITOR) 20 MG tablet    buPROPion (WELLBUTRIN XL) 300 MG 24 hr tablet    levothyroxine (SYNTHROID/LEVOTHROID) 50 MCG tablet    multivitamin therapeutic (THERAGRAN) tablet    naltrexone (DEPADE/REVIA) 50 MG tablet    venlafaxine (EFFEXOR XR) 75 MG 24 hr capsule    phentermine (ADIPEX-P) 37.5 MG tablet     No current facility-administered medications for this visit.        We discussed [facility] Bariatric  Basics including:  -eating 3 meals daily  -eating protein first  -eating slowly, chewing food well  -avoiding/limiting calorie containing beverages  -choosing wheat, not white with breads, crackers, pastas, jennifer, bagels, tortillas, rice  -limiting restaurant or cafeteria eating to twice a week or less  -We discussed the importance of restorative sleep and stress management in maintaining a healthy weight.  -We discussed insulin resistance and glycemic index as it relates to appetite and weight control  -We discussed the National Weight Control Registry healthy weight maintenance strategies and ways to optimize metabolism.  -We discussed the importance of physical activity including cardiovascular and strength training in maintaining a healthier weight and explored viable options.    Most recent labs:  Lab Results   Component Value Date    WBC 4.8 08/16/2023    HGB 14.0 08/16/2023    HCT 42.4 08/16/2023    MCV 91 08/16/2023     08/16/2023     Lab Results   Component Value Date    CHOL 199 08/16/2023     Lab Results   Component Value Date    HDL 48 (L) 08/16/2023       Lab Results   Component Value Date    TRIG 138 08/16/2023       Lab Results   Component Value Date    ALT 30 08/16/2023    AST 23 08/16/2023    ALKPHOS 78 08/16/2023       Lab Results   Component Value Date    TSH 3.68 09/29/2023       DIETARY HISTORY  Meals Per Day: 2-3  Eating Protein First?: yes  Food Diary: B:yogurt greek with banana L:skips or protein bar,  D:chicken with broccoli and wild felipe  Snacks Per Day: yes  Typical Snack: fruit or cheese, popcorn  Fluid Intake: intenional  Portion Control: improved  Calorie Containing Beverages: no  Alcohol per week: no  Typical Protein Food Choices: mainly lean  Choosing Whole Grains: yes  Grocery Shopping is done by: herself  Food Preparation is done by: herself  Meals at Restaurant/Cafeteria/Take Out Per Week: 0-1  Eating at the Table:   TV is Off During Meals:     Positive Changes Since Last  "Visit: maintaining 5# weight loss despite high stress  Struggling With: stress    Knowledgeable in Reading Food Labels: yes  Getting Adequate Protein: yes  Sleeping 7-8 hours/day interrupted  Stress management problematic    PHYSICAL ACTIVITY PATTERNS:  Cardiovascular: walking the dog  Strength Trainin-3 X/wk    REVIEW OF SYSTEMS    PHYSICAL EXAM:  Vitals: /74 (BP Location: Right arm, Patient Position: Sitting, Cuff Size: Adult Small)   Ht 1.651 m (5' 5\")   Wt 83.5 kg (184 lb)   BMI 30.62 kg/m        GEN: Pleasant, well groomed, in no acute distress  EYES: EOMI,  ENT: airway patent  NECK: no carotid bruits, no anterior/supraclavicular lymphadenopathy, thyroid normal   HEART: Rhythm regular, rate regular, no murmur   LUNGS: Clear  ABDOMEN: soft, non-tender, obese, no rashes   VASCULAR: no  lower extremity edema  MUSCULOSKELETAL:  muscle mass OK  SKIN:  no color changes of venous stasis, no ulcerations    Total time spent on the date of this encounter doing: chart review, review of test results, patient visit, physical exam, education, counseling, developing plan of care, and documenting = 30 minutes.          "

## 2023-11-19 ENCOUNTER — HEALTH MAINTENANCE LETTER (OUTPATIENT)
Age: 58
End: 2023-11-19

## 2023-12-15 DIAGNOSIS — E03.9 HYPOTHYROIDISM, UNSPECIFIED TYPE: ICD-10-CM

## 2023-12-15 RX ORDER — LEVOTHYROXINE SODIUM 50 UG/1
50 TABLET ORAL DAILY
Qty: 30 TABLET | Refills: 3 | Status: SHIPPED | OUTPATIENT
Start: 2023-12-15 | End: 2024-03-11

## 2024-01-11 ENCOUNTER — PATIENT OUTREACH (OUTPATIENT)
Dept: CARE COORDINATION | Facility: CLINIC | Age: 59
End: 2024-01-11
Payer: COMMERCIAL

## 2024-01-22 DIAGNOSIS — F34.1 DYSTHYMIC DISORDER: ICD-10-CM

## 2024-01-22 RX ORDER — VENLAFAXINE HYDROCHLORIDE 75 MG/1
150 CAPSULE, EXTENDED RELEASE ORAL DAILY
Qty: 180 CAPSULE | Refills: 3 | Status: SHIPPED | OUTPATIENT
Start: 2024-01-22

## 2024-01-25 ENCOUNTER — PATIENT OUTREACH (OUTPATIENT)
Dept: CARE COORDINATION | Facility: CLINIC | Age: 59
End: 2024-01-25
Payer: COMMERCIAL

## 2024-01-31 ENCOUNTER — OFFICE VISIT (OUTPATIENT)
Dept: SURGERY | Facility: CLINIC | Age: 59
End: 2024-01-31
Payer: COMMERCIAL

## 2024-01-31 VITALS
HEIGHT: 65 IN | SYSTOLIC BLOOD PRESSURE: 130 MMHG | DIASTOLIC BLOOD PRESSURE: 70 MMHG | BODY MASS INDEX: 31.04 KG/M2 | WEIGHT: 186.3 LBS

## 2024-01-31 DIAGNOSIS — R63.8 ABNORMAL CRAVING: ICD-10-CM

## 2024-01-31 DIAGNOSIS — E66.9 OBESITY (BMI 30-39.9): Primary | ICD-10-CM

## 2024-01-31 PROCEDURE — 99214 OFFICE O/P EST MOD 30 MIN: CPT | Performed by: FAMILY MEDICINE

## 2024-01-31 RX ORDER — NALTREXONE HYDROCHLORIDE 50 MG/1
50 TABLET, FILM COATED ORAL DAILY
Qty: 90 TABLET | Refills: 3 | Status: SHIPPED | OUTPATIENT
Start: 2024-01-31

## 2024-01-31 NOTE — PATIENT INSTRUCTIONS
The book is called Beyond Addiction by Jermaine Root. You can get it as an audiobook  Addiction counseling [facility]

## 2024-01-31 NOTE — PROGRESS NOTES
Bariatric Follow-up    58 year old  female BMI:Body mass index is 31 kg/m .    Weight:   Wt Readings from Last 1 Encounters:   01/31/24 84.5 kg (186 lb 4.8 oz)    pounds      Comorbidities:  Patient Active Problem List   Diagnosis    Urinary Tract Infection    Alcohol Abuse - Episodic    Depression With Anxiety    Hypercholesterolemia    Anxiety Disorder NOS    Arthritis    Dyslipidemia    Knee pain    Obesity (BMI 30.0-34.9)    Moderate major depression (H)    Benign neoplasm of ascending colon    Hypothyroidism, unspecified type       Interim: Was let go at work. Stress is off the chart. New grand child. Still stress with her mother.     Plan:  DIET  Do your best toward structured meal times now off work   EXERCISE Try to walk your dog every day for stress, wellness   PHARMACOTHERAPY will see if Zepbound is a covered benefit. Can increase Naltrexone to 50mg, continue Wellbutrin. Phentermine prn    We discussed the importance of hydration, staying ahead of potential constipation, and consuming 3 protein containing, nutrient dense meals while taking GLP-1 RA medications. With overwhelming stress-focus on your self cares and protecting sleep.       -We reviewed her medications and whether associated with weight gain.  Current Outpatient Medications   Medication    atorvastatin (LIPITOR) 20 MG tablet    buPROPion (WELLBUTRIN XL) 300 MG 24 hr tablet    levothyroxine (SYNTHROID/LEVOTHROID) 50 MCG tablet    multivitamin therapeutic (THERAGRAN) tablet    naltrexone (DEPADE/REVIA) 50 MG tablet    tirzepatide-Weight Management (ZEPBOUND) 10 MG/0.5ML prefilled pen    tirzepatide-Weight Management (ZEPBOUND) 12.5 MG/0.5ML prefilled pen    tirzepatide-Weight Management (ZEPBOUND) 15 MG/0.5ML prefilled pen    tirzepatide-Weight Management (ZEPBOUND) 2.5 MG/0.5ML prefilled pen    tirzepatide-Weight Management (ZEPBOUND) 5 MG/0.5ML prefilled pen    tirzepatide-Weight Management (ZEPBOUND) 7.5 MG/0.5ML prefilled pen     venlafaxine (EFFEXOR XR) 75 MG 24 hr capsule    phentermine (ADIPEX-P) 37.5 MG tablet     No current facility-administered medications for this visit.        We discussed [facility] Bariatric Basics including:  -eating 3 meals daily  -eating protein first  -eating slowly, chewing food well  -avoiding/limiting calorie containing beverages  -choosing wheat, not white with breads, crackers, pastas, jennifer, bagels, tortillas, rice  -limiting restaurant or cafeteria eating to twice a week or less  -We discussed the importance of restorative sleep and stress management in maintaining a healthy weight.  -We discussed insulin resistance and glycemic index as it relates to appetite and weight control  -We discussed the National Weight Control Registry healthy weight maintenance strategies and ways to optimize metabolism.  -We discussed the importance of physical activity including cardiovascular and strength training in maintaining a healthier weight and explored viable options.    Most recent labs:  Lab Results   Component Value Date    WBC 4.8 08/16/2023    HGB 14.0 08/16/2023    HCT 42.4 08/16/2023    MCV 91 08/16/2023     08/16/2023     Lab Results   Component Value Date    CHOL 199 08/16/2023     Lab Results   Component Value Date    HDL 48 (L) 08/16/2023       Lab Results   Component Value Date    TRIG 138 08/16/2023       Lab Results   Component Value Date    ALT 30 08/16/2023    AST 23 08/16/2023    ALKPHOS 78 08/16/2023       Lab Results   Component Value Date    TSH 3.68 09/29/2023       DIETARY HISTORY  Meals Per Day: trying  Eating Protein First?: not currently  Food Diary: varies now  Snacks Per Day: stress eating  Typical Snack: sugar, soda  Fluid Intake: intentional  Portion Control: stable  Calorie Containing Beverages: yes lately  Alcohol per week: none  Typical Protein Food Choices: adequate  Choosing Whole Grains: yes  Grocery Shopping is done by: herself  Food Preparation is done by: herself  Meals  "at Restaurant/Cafeteria/Take Out Per Week: <2  Eating at the Table:   TV is Off During Meals:     Positive Changes Since Last Visit: surviving high stress  Struggling With: stress    Knowledgeable in Reading Food Labels: yes  Getting Adequate Protein: yes  Sleeping 7-8 hours/day no  Stress management loves her dog-takes him to the dog park    PHYSICAL ACTIVITY PATTERNS:  Cardiovascular: walking the dog  Strength Training: no        PHYSICAL EXAM:  Vitals: /70   Ht 1.651 m (5' 5\")   Wt 84.5 kg (186 lb 4.8 oz)   BMI 31.00 kg/m        GEN: Pleasant, well groomed, in no acute distress  EYES: EOMI,  ENT: airway 2+  HEART: Rhythm regular, rate regular, no murmur   LUNGS: Clear  VASCULAR: no  lower extremity edema  MUSCULOSKELETAL:  muscle mass OK  SKIN:  no color changes of venous stasis, no ulcerations    Total time spent on the date of this encounter doing: chart review, review of test results, patient visit, physical exam, education, counseling, developing plan of care, and documenting = 30 minutes.          "

## 2024-01-31 NOTE — LETTER
1/31/2024         RE: Nubia So  [address]  [place] [address]        Dear Colleague,    Thank you for referring your patient, Nubia So, to the [facility] [place]. Please see a copy of my visit note below.    Bariatric Follow-up    58 year old  female BMI:Body mass index is 31 kg/m .    Weight:   Wt Readings from Last 1 Encounters:   01/31/24 84.5 kg (186 lb 4.8 oz)    pounds      Comorbidities:  Patient Active Problem List   Diagnosis     Urinary Tract Infection     Alcohol Abuse - Episodic     Depression With Anxiety     Hypercholesterolemia     Anxiety Disorder NOS     Arthritis     Dyslipidemia     Knee pain     Obesity (BMI 30.0-34.9)     Moderate major depression (H)     Benign neoplasm of ascending colon     Hypothyroidism, unspecified type       Interim: Was let go at work. Stress is off the chart. New grand child. Still stress with her mother.     Plan:  DIET  Do your best toward structured meal times now off work   EXERCISE Try to walk your dog every day for stress, wellness   PHARMACOTHERAPY will see if Zepbound is a covered benefit. Can increase Naltrexone to 50mg, continue Wellbutrin. Phentermine prn    We discussed the importance of hydration, staying ahead of potential constipation, and consuming 3 protein containing, nutrient dense meals while taking GLP-1 RA medications. With overwhelming stress-focus on your self cares and protecting sleep.       -We reviewed her medications and whether associated with weight gain.  Current Outpatient Medications   Medication     atorvastatin (LIPITOR) 20 MG tablet     buPROPion (WELLBUTRIN XL) 300 MG 24 hr tablet     levothyroxine (SYNTHROID/LEVOTHROID) 50 MCG tablet     multivitamin therapeutic (THERAGRAN) tablet     naltrexone (DEPADE/REVIA) 50 MG tablet     tirzepatide-Weight Management (ZEPBOUND) 10 MG/0.5ML prefilled pen     tirzepatide-Weight Management (ZEPBOUND) 12.5 MG/0.5ML prefilled  pen     tirzepatide-Weight Management (ZEPBOUND) 15 MG/0.5ML prefilled pen     tirzepatide-Weight Management (ZEPBOUND) 2.5 MG/0.5ML prefilled pen     tirzepatide-Weight Management (ZEPBOUND) 5 MG/0.5ML prefilled pen     tirzepatide-Weight Management (ZEPBOUND) 7.5 MG/0.5ML prefilled pen     venlafaxine (EFFEXOR XR) 75 MG 24 hr capsule     phentermine (ADIPEX-P) 37.5 MG tablet     No current facility-administered medications for this visit.        We discussed [facility] Bariatric Basics including:  -eating 3 meals daily  -eating protein first  -eating slowly, chewing food well  -avoiding/limiting calorie containing beverages  -choosing wheat, not white with breads, crackers, pastas, jennifer, bagels, tortillas, rice  -limiting restaurant or cafeteria eating to twice a week or less  -We discussed the importance of restorative sleep and stress management in maintaining a healthy weight.  -We discussed insulin resistance and glycemic index as it relates to appetite and weight control  -We discussed the National Weight Control Registry healthy weight maintenance strategies and ways to optimize metabolism.  -We discussed the importance of physical activity including cardiovascular and strength training in maintaining a healthier weight and explored viable options.    Most recent labs:  Lab Results   Component Value Date    WBC 4.8 08/16/2023    HGB 14.0 08/16/2023    HCT 42.4 08/16/2023    MCV 91 08/16/2023     08/16/2023     Lab Results   Component Value Date    CHOL 199 08/16/2023     Lab Results   Component Value Date    HDL 48 (L) 08/16/2023       Lab Results   Component Value Date    TRIG 138 08/16/2023       Lab Results   Component Value Date    ALT 30 08/16/2023    AST 23 08/16/2023    ALKPHOS 78 08/16/2023       Lab Results   Component Value Date    TSH 3.68 09/29/2023       DIETARY HISTORY  Meals Per Day: trying  Eating Protein First?: not currently  Food Diary: varies now  Snacks Per Day: stress  "eating  Typical Snack: sugar, soda  Fluid Intake: intentional  Portion Control: stable  Calorie Containing Beverages: yes lately  Alcohol per week: none  Typical Protein Food Choices: adequate  Choosing Whole Grains: yes  Grocery Shopping is done by: herself  Food Preparation is done by: herself  Meals at Restaurant/Cafeteria/Take Out Per Week: <2  Eating at the Table:   TV is Off During Meals:     Positive Changes Since Last Visit: surviving high stress  Struggling With: stress    Knowledgeable in Reading Food Labels: yes  Getting Adequate Protein: yes  Sleeping 7-8 hours/day no  Stress management loves her dog-takes him to the dog park    PHYSICAL ACTIVITY PATTERNS:  Cardiovascular: walking the dog  Strength Training: no        PHYSICAL EXAM:  Vitals: /70   Ht 1.651 m (5' 5\")   Wt 84.5 kg (186 lb 4.8 oz)   BMI 31.00 kg/m        GEN: Pleasant, well groomed, in no acute distress  EYES: EOMI,  ENT: airway 2+  HEART: Rhythm regular, rate regular, no murmur   LUNGS: Clear  VASCULAR: no  lower extremity edema  MUSCULOSKELETAL:  muscle mass OK  SKIN:  no color changes of venous stasis, no ulcerations    Total time spent on the date of this encounter doing: chart review, review of test results, patient visit, physical exam, education, counseling, developing plan of care, and documenting = 30 minutes.            Again, thank you for allowing me to participate in the care of your patient.        Sincerely,        Kay Bateman MD  "

## 2024-02-12 ENCOUNTER — TELEPHONE (OUTPATIENT)
Dept: SURGERY | Facility: CLINIC | Age: 59
End: 2024-02-12
Payer: COMMERCIAL

## 2024-02-12 NOTE — TELEPHONE ENCOUNTER
Retail Pharmacy Prior Authorization Team   Phone: [phone]    PRIOR AUTHORIZATION DENIED    Medication: ZEPBOUND 2.5 MG/0.5ML SC SOAJ  Insurance Company: [employer] [place] - Phone [phone] Fax [phone]  Denial Date: 2/9/2024  Denial Reason(s): WEIGHT LOSS DRUGS ARE NOT COVERED UNDER PATIENT'S RX BENEFITS - PLAN EXCLUSION      Appeal Information: IF PROVIDER WOULD LIKE TO APPEAL THIS DECISION PLEASE PROVIDE THE PA TEAM WITH A LETTER OF MEDICAL NECESSITY      Patient Notified: No

## 2024-02-20 ENCOUNTER — E-VISIT (OUTPATIENT)
Dept: FAMILY MEDICINE | Facility: CLINIC | Age: 59
End: 2024-02-20
Payer: COMMERCIAL

## 2024-02-20 DIAGNOSIS — R51.9 POUNDING HEADACHE: Primary | ICD-10-CM

## 2024-02-20 PROCEDURE — 99421 OL DIG E/M SVC 5-10 MIN: CPT | Performed by: FAMILY MEDICINE

## 2024-02-20 NOTE — PATIENT INSTRUCTIONS
Thank you for choosing us for your care. Based on the information provided, I believe you need to be seen immediately.  Please go urgent care as soon as possible.     Walk-in urgent care at [facility] is a good option.    I usually recommend imaging for the worst headache of life.  This can be pursued in urgent care.  Other option is urgency room on [facility].    You will not be charged for this eVisit.

## 2024-03-11 DIAGNOSIS — E03.9 HYPOTHYROIDISM, UNSPECIFIED TYPE: ICD-10-CM

## 2024-03-11 DIAGNOSIS — E78.00 PURE HYPERCHOLESTEROLEMIA: ICD-10-CM

## 2024-03-11 DIAGNOSIS — F34.1 DYSTHYMIC DISORDER: ICD-10-CM

## 2024-03-11 RX ORDER — LEVOTHYROXINE SODIUM 50 UG/1
50 TABLET ORAL DAILY
Qty: 90 TABLET | Refills: 2 | Status: SHIPPED | OUTPATIENT
Start: 2024-03-11

## 2024-03-11 RX ORDER — ATORVASTATIN CALCIUM 20 MG/1
20 TABLET, FILM COATED ORAL DAILY
Qty: 90 TABLET | Refills: 2 | Status: SHIPPED | OUTPATIENT
Start: 2024-03-11

## 2024-03-11 RX ORDER — BUPROPION HYDROCHLORIDE 300 MG/1
300 TABLET ORAL EVERY MORNING
Qty: 90 TABLET | Refills: 3 | OUTPATIENT
Start: 2024-03-11

## 2024-04-07 ENCOUNTER — HEALTH MAINTENANCE LETTER (OUTPATIENT)
Age: 59
End: 2024-04-07

## 2024-04-09 ENCOUNTER — E-VISIT (OUTPATIENT)
Dept: FAMILY MEDICINE | Facility: CLINIC | Age: 59
End: 2024-04-09
Payer: COMMERCIAL

## 2024-04-09 DIAGNOSIS — K29.70 GASTRITIS, PRESENCE OF BLEEDING UNSPECIFIED, UNSPECIFIED CHRONICITY, UNSPECIFIED GASTRITIS TYPE: Primary | ICD-10-CM

## 2024-04-09 PROCEDURE — 99207 PR NO CHARGE LOS: CPT | Performed by: FAMILY MEDICINE

## 2024-04-10 NOTE — PATIENT INSTRUCTIONS
Thank you for choosing us for your care. I have placed an order for a prescription so that you can start treatment. View your full visit summary for details by clicking on the link below. Your pharmacist will able to address any questions you may have about the medication.     If you're not feeling better within 5-7 days, please schedule an appointment.  You can schedule an appointment right here in [facility], or call [phone]  If the visit is for the same symptoms as your eVisit, we'll refund the cost of your eVisit if seen within seven days.      I would like to know if you have been consuming alcoholic.  In that case you may need further workup including checkup for any pancreatitis.  Meanwhile, I am sending omeprazole that she should take daily to see if that benefits.  If you are already using this medication then you need a clinic follow-up.    I am in clinic tomorrow afternoon available for video visit and I can talk to you further and discuss to order ultrasound/CT scan/referral as needed.  You can schedule a virtual appointment for tomorrow.

## 2024-04-10 NOTE — TELEPHONE ENCOUNTER
From: Hamida Panchal  To: Rosalva Roe  Sent: 10/11/2021 9:21 AM CDT  Subject: Iron Lab Test    Good morning,  Please review the results of the iron blood test from 10-8-21 and let me know if I need to do anything.  Thank you,  Hamida Panchal    Provider E-Visit time total (minutes): 12

## 2024-04-11 ENCOUNTER — VIRTUAL VISIT (OUTPATIENT)
Dept: FAMILY MEDICINE | Facility: CLINIC | Age: 59
End: 2024-04-11
Payer: COMMERCIAL

## 2024-04-11 DIAGNOSIS — Z12.31 VISIT FOR SCREENING MAMMOGRAM: ICD-10-CM

## 2024-04-11 DIAGNOSIS — R11.2 NAUSEA AND VOMITING, UNSPECIFIED VOMITING TYPE: ICD-10-CM

## 2024-04-11 DIAGNOSIS — R10.84 ABDOMINAL PAIN, GENERALIZED: Primary | ICD-10-CM

## 2024-04-11 DIAGNOSIS — F32.1 MODERATE MAJOR DEPRESSION (H): ICD-10-CM

## 2024-04-11 PROCEDURE — G2211 COMPLEX E/M VISIT ADD ON: HCPCS | Mod: 95 | Performed by: FAMILY MEDICINE

## 2024-04-11 PROCEDURE — 99214 OFFICE O/P EST MOD 30 MIN: CPT | Mod: 95 | Performed by: FAMILY MEDICINE

## 2024-04-11 ASSESSMENT — ENCOUNTER SYMPTOMS: ABDOMINAL PAIN: 1

## 2024-04-11 ASSESSMENT — PATIENT HEALTH QUESTIONNAIRE - PHQ9
SUM OF ALL RESPONSES TO PHQ QUESTIONS 1-9: 5
SUM OF ALL RESPONSES TO PHQ QUESTIONS 1-9: 5
10. IF YOU CHECKED OFF ANY PROBLEMS, HOW DIFFICULT HAVE THESE PROBLEMS MADE IT FOR YOU TO DO YOUR WORK, TAKE CARE OF THINGS AT HOME, OR GET ALONG WITH OTHER PEOPLE: SOMEWHAT DIFFICULT

## 2024-04-11 NOTE — LETTER
April 11, 2024      Nubia So  [address] [place] [address]        To Whom It May Concern:    Nubia So was seen in our clinic. She may return to work without restrictions.      Sincerely,        Royal Stewart MD

## 2024-04-11 NOTE — PROGRESS NOTES
Nubia is a 58 year old who is being evaluated via a billable video visit.    How would you like to obtain your AVS? MyChart  If the video visit is dropped, the invitation should be resent by: Text to cell phone: [phone]  Will anyone else be joining your video visit? No      Assessment & Plan     ICD-10-CM    1. Abdominal pain, generalized  R10.84 Comprehensive metabolic panel (BMP + Alb, Alk Phos, ALT, AST, Total. Bili, TP)     Hemoglobin A1c     Lipase     UA Macroscopic with reflex to Microscopic and Culture - Lab Collect     CBC with platelets and differential      2. Visit for screening mammogram  Z12.31 MA SCREENING DIGITAL BILAT - Future  (s+30)      3. Nausea and vomiting, unspecified vomiting type  R11.2       4. Moderate major depression (H)  F32.1         Medical history making: Patient today presents for abdominal pain with nausea and vomiting.  Patient has many years of symptoms of GERD and abdominal pain and usually relieved with Mylanta.  However 2 days ago she started having similar symptoms and took Mylanta without relief.  The symptoms got severe with violent vomiting.  She also had some fevers with this episode.  Highest temp was 100.8  F.  She did feel better after this episode but does have some discomfort.  She does have a history of alcohol abuse but informs me that she has not had a drink during this episode.  We discussed various differential including gastritis and started patient on omeprazole.  She will take 1 tablet daily for the next 2 to 4 weeks.  In addition I would like to check her electrolytes and liver function test.  I will also check for lipase of possible pancreatitis.  With history of nausea and vomiting, possibility of pyelonephritis and abdominal pain is in consideration and this is ordered.  If.  Times continue, she will seek help.  For her moderate major depression, her PHQ score remains in the mild category.  She was taking Wellbutrin that she will  "continue.        BMI  Estimated body mass index is 31 kg/m  as calculated from the following:    Height as of 1/31/24: 1.651 m (5' 5\").    Weight as of 1/31/24: 84.5 kg (186 lb 4.8 oz).              - Trial of PPI as recommended       - Continue other medications without change      Adrian Delacruz is a 58 year old, presenting for the following health issues:  Abdominal Pain        4/11/2024     1:05 PM   Additional Questions   Roomed by Yajaira Quigley CMA     History of Present Illness       Reason for visit:  Painful Gastritis and headache mild but i get frequently  Symptom onset:  1-3 days ago  Symptoms include:  Pain in abdomen. Nausea and vomiting.  Concerned because of bulging jordy in the past for over 30 years  Symptom intensity:  Moderate  Symptom progression:  Improving  Had these symptoms before:  Yes  Has tried/received treatment for these symptoms:  Yes  Previous treatment was successful:  Yes  Prior treatment description:  Advised to take Mylanta  What makes it worse:  Spicy foods  What makes it better:  Mylanta but it was not providing relief at all this last episode    She eats 2-3 servings of fruits and vegetables daily.She consumes 0 sweetened beverage(s) daily.She exercises with enough effort to increase her heart rate 20 to 29 minutes per day.  She exercises with enough effort to increase her heart rate 3 or less days per week.   She is taking medications regularly.     Patient Active Problem List   Diagnosis    Alcohol Abuse - Episodic    Depression With Anxiety    Hypercholesterolemia    Anxiety Disorder NOS    Arthritis    Dyslipidemia    Knee pain    Obesity (BMI 30.0-34.9)    Moderate major depression (H)    Benign neoplasm of ascending colon    Hypothyroidism, unspecified type     Current Outpatient Medications   Medication Sig Dispense Refill    atorvastatin (LIPITOR) 20 MG tablet Take 1 tablet (20 mg) by mouth daily 90 tablet 2    buPROPion (WELLBUTRIN XL) 300 MG 24 hr tablet Take 1 tablet " (300 mg) by mouth every morning 90 tablet 3    levothyroxine (SYNTHROID/LEVOTHROID) 50 MCG tablet Take 1 tablet (50 mcg) by mouth daily 90 tablet 2    multivitamin therapeutic (THERAGRAN) tablet [MULTIVITAMIN THERAPEUTIC (THERAGRAN) TABLET] Take 1 tablet by mouth daily.      naltrexone (DEPADE/REVIA) 50 MG tablet Take 1 tablet (50 mg) by mouth daily 90 tablet 3    omeprazole (PRILOSEC) 20 MG DR capsule Take 1 capsule (20 mg) by mouth daily 30 capsule 0    venlafaxine (EFFEXOR XR) 75 MG 24 hr capsule Take 2 capsules (150 mg) by mouth daily 180 capsule 3     No current facility-administered medications for this visit.           Review of Systems  Constitutional, neuro, ENT, endocrine, pulmonary, cardiac, gastrointestinal, genitourinary, musculoskeletal, integument and psychiatric systems are negative, except as otherwise noted.      Objective           Vitals:  No vitals were obtained today due to virtual visit.    Physical Exam   GENERAL: alert and no distress  EYES: Eyes grossly normal to inspection.  No discharge or erythema, or obvious scleral/conjunctival abnormalities.  RESP: No audible wheeze, cough, or visible cyanosis.    SKIN: Visible skin clear. No significant rash, abnormal pigmentation or lesions.  NEURO: Cranial nerves grossly intact.  Mentation and speech appropriate for age.  PSYCH: Appropriate affect, tone, and pace of words        2/10/2023     7:55 AM 10/23/2023    10:55 AM 4/11/2024    11:46 AM   PHQ   PHQ-9 Total Score 8 3 5   Q9: Thoughts of better off dead/self-harm past 2 weeks Not at all Not at all Not at all           Video-Visit Details    Type of service:  Video Visit   Originating Location (pt. Location): Home    Distant Location (provider location):  On-site  Platform used for Video Visit: Gama  Signed Electronically by: Royal Stewart MD

## 2024-04-19 ENCOUNTER — LAB (OUTPATIENT)
Dept: LAB | Facility: CLINIC | Age: 59
End: 2024-04-19
Payer: COMMERCIAL

## 2024-04-19 DIAGNOSIS — R73.03 PREDIABETES: ICD-10-CM

## 2024-04-19 DIAGNOSIS — R10.84 ABDOMINAL PAIN, GENERALIZED: ICD-10-CM

## 2024-04-19 LAB
ALBUMIN UR-MCNC: NEGATIVE MG/DL
APPEARANCE UR: CLEAR
BACTERIA #/AREA URNS HPF: ABNORMAL /HPF
BASOPHILS # BLD AUTO: 0 10E3/UL (ref 0–0.2)
BASOPHILS NFR BLD AUTO: 1 %
BILIRUB UR QL STRIP: NEGATIVE
COLOR UR AUTO: YELLOW
EOSINOPHIL # BLD AUTO: 0.1 10E3/UL (ref 0–0.7)
EOSINOPHIL NFR BLD AUTO: 3 %
ERYTHROCYTE [DISTWIDTH] IN BLOOD BY AUTOMATED COUNT: 13.1 % (ref 10–15)
GLUCOSE UR STRIP-MCNC: NEGATIVE MG/DL
HBA1C MFR BLD: 5.6 % (ref 0–5.6)
HCT VFR BLD AUTO: 43 % (ref 35–47)
HGB BLD-MCNC: 14.3 G/DL (ref 11.7–15.7)
HGB UR QL STRIP: NEGATIVE
IMM GRANULOCYTES # BLD: 0 10E3/UL
IMM GRANULOCYTES NFR BLD: 0 %
KETONES UR STRIP-MCNC: ABNORMAL MG/DL
LEUKOCYTE ESTERASE UR QL STRIP: ABNORMAL
LYMPHOCYTES # BLD AUTO: 1.9 10E3/UL (ref 0.8–5.3)
LYMPHOCYTES NFR BLD AUTO: 40 %
MCH RBC QN AUTO: 29.5 PG (ref 26.5–33)
MCHC RBC AUTO-ENTMCNC: 33.3 G/DL (ref 31.5–36.5)
MCV RBC AUTO: 89 FL (ref 78–100)
MONOCYTES # BLD AUTO: 0.4 10E3/UL (ref 0–1.3)
MONOCYTES NFR BLD AUTO: 9 %
NEUTROPHILS # BLD AUTO: 2.2 10E3/UL (ref 1.6–8.3)
NEUTROPHILS NFR BLD AUTO: 47 %
NITRATE UR QL: NEGATIVE
PH UR STRIP: 5.5 [PH] (ref 5–8)
PLATELET # BLD AUTO: 346 10E3/UL (ref 150–450)
RBC # BLD AUTO: 4.84 10E6/UL (ref 3.8–5.2)
RBC #/AREA URNS AUTO: ABNORMAL /HPF
SP GR UR STRIP: 1.02 (ref 1–1.03)
SQUAMOUS #/AREA URNS AUTO: ABNORMAL /LPF
UROBILINOGEN UR STRIP-ACNC: 0.2 E.U./DL
WBC # BLD AUTO: 4.7 10E3/UL (ref 4–11)
WBC #/AREA URNS AUTO: ABNORMAL /HPF

## 2024-04-19 PROCEDURE — 85025 COMPLETE CBC W/AUTO DIFF WBC: CPT

## 2024-04-19 PROCEDURE — 83036 HEMOGLOBIN GLYCOSYLATED A1C: CPT

## 2024-04-19 PROCEDURE — 80053 COMPREHEN METABOLIC PANEL: CPT

## 2024-04-19 PROCEDURE — 81001 URINALYSIS AUTO W/SCOPE: CPT

## 2024-04-19 PROCEDURE — 36415 COLL VENOUS BLD VENIPUNCTURE: CPT

## 2024-04-19 PROCEDURE — 83690 ASSAY OF LIPASE: CPT

## 2024-04-20 ENCOUNTER — NURSE TRIAGE (OUTPATIENT)
Dept: NURSING | Facility: CLINIC | Age: 59
End: 2024-04-20
Payer: COMMERCIAL

## 2024-04-20 LAB
ALBUMIN SERPL BCG-MCNC: 4.5 G/DL (ref 3.5–5.2)
ALP SERPL-CCNC: 81 U/L (ref 40–150)
ALT SERPL W P-5'-P-CCNC: 90 U/L (ref 0–50)
ANION GAP SERPL CALCULATED.3IONS-SCNC: 10 MMOL/L (ref 7–15)
AST SERPL W P-5'-P-CCNC: 35 U/L (ref 0–45)
BILIRUB SERPL-MCNC: 0.3 MG/DL
BUN SERPL-MCNC: 21.2 MG/DL (ref 6–20)
CALCIUM SERPL-MCNC: 9.7 MG/DL (ref 8.6–10)
CHLORIDE SERPL-SCNC: 104 MMOL/L (ref 98–107)
CREAT SERPL-MCNC: 0.83 MG/DL (ref 0.51–0.95)
DEPRECATED HCO3 PLAS-SCNC: 26 MMOL/L (ref 22–29)
EGFRCR SERPLBLD CKD-EPI 2021: 81 ML/MIN/1.73M2
GLUCOSE SERPL-MCNC: 126 MG/DL (ref 70–99)
LIPASE SERPL-CCNC: 51 U/L (ref 13–60)
POTASSIUM SERPL-SCNC: 4.4 MMOL/L (ref 3.4–5.3)
PROT SERPL-MCNC: 7.1 G/DL (ref 6.4–8.3)
SODIUM SERPL-SCNC: 140 MMOL/L (ref 135–145)

## 2024-04-20 NOTE — TELEPHONE ENCOUNTER
"  Nurse Triage SBAR    Is this a 2nd Level Triage? YES, LICENSED PRACTITIONER REVIEW IS REQUIRED    Situation: Positive UA and having symptoms.    Background: Patient reports she had a UA done yesterday and saw the positive results on MyChart. Lab has not yet been reviewed by a provider.    Assessment: Reports she has Hx of UTIs. UA positive for leukocytes. She reports frequency and \"spotting\" when she wipes. She is requesting Abx. Allergies to sulfa and cephalexin. Requesting Rx to be sent to [facility] pharmacy on [address] in [place].     Protocol Recommended Disposition:   Call PCP Within 24 Hours    Recommendation: Call placed to answering service and triager connected to on-call provider\"s, Dr. Giuliana Hameed, cell phone. She states d/t no RBC or WBC, she does not believe patient has UTI, and does not recommend Abx at this time. She states the spotting is more concerning for post-menopausal bleeding that can happen with uterine cancer, and strongly recommends that patient follow up in person in clinic next week. Patient may try taking AZO at this time to see if it helps with symptoms.    Paged to provider    Provider Recommendation Follow Up:   Unable to reach patient/caregiver. Left message to return call to [phone]. Upon return call please notify caller of provider's recommendations, and assist in setting up follow up appt with a provider next week.        Does the patient meet one of the following criteria for ADS visit consideration? 16+ years old, with an [facility] PCP     TIP  Providers, please consider if this condition is appropriate for management at one of our Acute and Diagnostic Services sites.     If patient is a good candidate, please use dotphrase <dot>triageresponse and select Refer to ADS to document.      Reason for Disposition   [1] POSITIVE urine test (i.e., NI + or LE + or WBC > 10) AND [2] NO standing order to call in prescription for antibiotic    Additional Information   " Negative: [1] Diagnosed urinary tract infection AND [2] female taking antibiotic   Negative: [1] Diagnosed urinary tract infection AND [2] male taking antibiotic   Negative: Patient sounds very sick or weak to the triager   Negative: [1] POSITIVE urine test (i.e., NI + or LE+ or WBC > 10) AND [2] fever > 100.4 F (38.0 C)   Negative: [1] POSITIVE urine test AND [2] side (flank) or lower back pain present   Negative: [1] POSITIVE urine test AND [2] pregnant   Negative: [1] NEGATIVE urine test (i.e., NI - and LE - and WBC < 10) AND [2] urine symptoms continue or are  worsening    Protocols used: Urinalysis Results Follow-up Call-A-

## 2024-04-20 NOTE — TELEPHONE ENCOUNTER
Provider Recommendation Follow Up:   Reached patient/caregiver. Informed of provider's recommendations. Patient verbalized understanding and agrees with the plan. Offered to get her connected with scheduling for appt, but she states she will call the clinic on Monday and will try AZO over the weekend.

## 2024-05-02 DIAGNOSIS — K29.70 GASTRITIS, PRESENCE OF BLEEDING UNSPECIFIED, UNSPECIFIED CHRONICITY, UNSPECIFIED GASTRITIS TYPE: ICD-10-CM

## 2024-08-08 ENCOUNTER — PATIENT OUTREACH (OUTPATIENT)
Dept: CARE COORDINATION | Facility: CLINIC | Age: 59
End: 2024-08-08
Payer: COMMERCIAL

## 2024-09-11 DIAGNOSIS — F34.1 DYSTHYMIC DISORDER: ICD-10-CM

## 2024-09-12 RX ORDER — BUPROPION HYDROCHLORIDE 300 MG/1
300 TABLET ORAL EVERY MORNING
Qty: 90 TABLET | Refills: 3 | OUTPATIENT
Start: 2024-09-12

## 2024-09-23 ENCOUNTER — MYC REFILL (OUTPATIENT)
Dept: FAMILY MEDICINE | Facility: CLINIC | Age: 59
End: 2024-09-23
Payer: COMMERCIAL

## 2024-09-23 DIAGNOSIS — F34.1 DYSTHYMIC DISORDER: ICD-10-CM

## 2024-09-23 DIAGNOSIS — K29.70 GASTRITIS, PRESENCE OF BLEEDING UNSPECIFIED, UNSPECIFIED CHRONICITY, UNSPECIFIED GASTRITIS TYPE: ICD-10-CM

## 2024-09-24 ENCOUNTER — PATIENT OUTREACH (OUTPATIENT)
Dept: FAMILY MEDICINE | Facility: CLINIC | Age: 59
End: 2024-09-24
Payer: COMMERCIAL

## 2024-09-24 RX ORDER — BUPROPION HYDROCHLORIDE 300 MG/1
300 TABLET ORAL EVERY MORNING
Qty: 90 TABLET | Refills: 0 | Status: SHIPPED | OUTPATIENT
Start: 2024-09-24

## 2024-09-24 NOTE — TELEPHONE ENCOUNTER
Patient Quality Outreach    Patient is due for the following:   Breast Cancer Screening - Mammogram    Next Steps:   Mammogram    Type of outreach:    Sent MyEdu message.      Questions for provider review:    None           Selin Crow MA

## 2024-09-24 NOTE — TELEPHONE ENCOUNTER
Called patient. Patient is due for refills for Bupropion and is in need of refill. Routing for PHQ 9.    Abraham Grewal RN

## 2024-11-18 ENCOUNTER — ANCILLARY PROCEDURE (OUTPATIENT)
Dept: MAMMOGRAPHY | Facility: HOSPITAL | Age: 59
End: 2024-11-18
Attending: FAMILY MEDICINE
Payer: COMMERCIAL

## 2024-11-18 DIAGNOSIS — Z12.31 VISIT FOR SCREENING MAMMOGRAM: ICD-10-CM

## 2024-11-18 PROCEDURE — 77063 BREAST TOMOSYNTHESIS BI: CPT

## 2024-11-18 PROCEDURE — 77067 SCR MAMMO BI INCL CAD: CPT

## 2024-12-10 DIAGNOSIS — E03.9 HYPOTHYROIDISM, UNSPECIFIED TYPE: ICD-10-CM

## 2024-12-10 DIAGNOSIS — F34.1 DYSTHYMIC DISORDER: ICD-10-CM

## 2024-12-11 RX ORDER — VENLAFAXINE HYDROCHLORIDE 75 MG/1
150 CAPSULE, EXTENDED RELEASE ORAL DAILY
Qty: 180 CAPSULE | Refills: 3 | OUTPATIENT
Start: 2024-12-11

## 2024-12-11 RX ORDER — LEVOTHYROXINE SODIUM 50 UG/1
50 TABLET ORAL DAILY
Qty: 90 TABLET | Refills: 2 | OUTPATIENT
Start: 2024-12-11

## 2024-12-12 ENCOUNTER — VIRTUAL VISIT (OUTPATIENT)
Dept: FAMILY MEDICINE | Facility: CLINIC | Age: 59
End: 2024-12-12
Payer: COMMERCIAL

## 2024-12-12 DIAGNOSIS — E03.9 HYPOTHYROIDISM, UNSPECIFIED TYPE: ICD-10-CM

## 2024-12-12 DIAGNOSIS — F34.1 DYSTHYMIC DISORDER: ICD-10-CM

## 2024-12-12 RX ORDER — BUPROPION HYDROCHLORIDE 300 MG/1
300 TABLET ORAL EVERY MORNING
Qty: 90 TABLET | Refills: 3 | Status: SHIPPED | OUTPATIENT
Start: 2024-12-12

## 2024-12-12 RX ORDER — VENLAFAXINE HYDROCHLORIDE 37.5 MG/1
37.5 CAPSULE, EXTENDED RELEASE ORAL DAILY
Qty: 90 CAPSULE | Refills: 0 | Status: SHIPPED | OUTPATIENT
Start: 2024-12-12

## 2024-12-12 RX ORDER — LEVOTHYROXINE SODIUM 50 UG/1
50 TABLET ORAL DAILY
Qty: 90 TABLET | Refills: 0 | Status: SHIPPED | OUTPATIENT
Start: 2024-12-12

## 2024-12-12 ASSESSMENT — PATIENT HEALTH QUESTIONNAIRE - PHQ9: SUM OF ALL RESPONSES TO PHQ QUESTIONS 1-9: 6

## 2024-12-12 NOTE — PROGRESS NOTES
"Nubia is a 59 year old who is being evaluated via a billable telephone visit.    What phone number would you like to be contacted at? [phone]  How would you like to obtain your AVS? Gadiel  Originating Location (pt. Location): Other Work    Distant Location (provider location):  On-site    Assessment & Plan     ICD-10-CM    1. Dysthymic disorder  F34.1 buPROPion (WELLBUTRIN XL) 300 MG 24 hr tablet     venlafaxine (EFFEXOR XR) 37.5 MG 24 hr capsule      2. Hypothyroidism, unspecified type  E03.9 levothyroxine (SYNTHROID/LEVOTHROID) 50 MCG tablet        Patient presents today for follow-up.  She is trying to wean off venlafaxine and is now using 75 mcg daily.  She feels that she can further wean off and new prescription sent to the pharmacy.  She does need a refill of her bupropion.  Regarding hypothyroidism, she has not been seen since last October.  She is due for annual [facility] exam and lab work.  Medication refilled and encouraged to schedule an appointment.  Patient verbalizes understanding.    BMI  Estimated body mass index is 31 kg/m  as calculated from the following:    Height as of 1/31/24: 1.651 m (5' 5\").    Weight as of 1/31/24: 84.5 kg (186 lb 4.8 oz).         MEDICATIONS:  Continue current medications without change  Work on weight loss  Regular exercise  See Patient Instructions    Subjective   Nubia is a 59 year old, presenting for the following health issues:  Recheck Medication (Pt has been tapering off the venlafaxine- pt is doing pretty well with this. )        12/12/2024    11:17 AM   Additional Questions   Roomed by Yajaira Quigley CMA     History of Present Illness       Reason for visit:  Weight gain, trying to wean off antidepressants, advice   She is taking medications regularly.     Patient Active Problem List   Diagnosis    Alcohol Abuse - Episodic    Depression With Anxiety    Hypercholesterolemia    Anxiety Disorder NOS    Arthritis    Dyslipidemia    Knee pain    Obesity (BMI 30.0-34.9) "    Moderate major depression (H)    Benign neoplasm of ascending colon    Hypothyroidism, unspecified type     Patient Active Problem List   Diagnosis    Alcohol Abuse - Episodic    Depression With Anxiety    Hypercholesterolemia    Anxiety Disorder NOS    Arthritis    Dyslipidemia    Knee pain    Obesity (BMI 30.0-34.9)    Moderate major depression (H)    Benign neoplasm of ascending colon    Hypothyroidism, unspecified type           Review of Systems  Constitutional, neuro, ENT, endocrine, pulmonary, cardiac, gastrointestinal, genitourinary, musculoskeletal, integument and psychiatric systems are negative, except as otherwise noted.      Objective           Vitals:  No vitals were obtained today due to virtual visit.    Physical Exam   General: Alert and no distress //Respiratory: No audible wheeze, cough, or shortness of breath // Psychiatric:  Appropriate affect, tone, and pace of words      Lab on 04/19/2024   Component Date Value Ref Range Status    Hemoglobin A1C 04/19/2024 5.6  0.0 - 5.6 % Final    Normal <5.7%   Prediabetes 5.7-6.4%    Diabetes 6.5% or higher     Note: Adopted from ADA consensus guidelines.    Sodium 04/19/2024 140  135 - 145 mmol/L Final    Reference intervals for this test were updated on 09/26/2023 to more accurately reflect our healthy population. There may be differences in the flagging of prior results with similar values performed with this method. Interpretation of those prior results can be made in the context of the updated reference intervals.     Potassium 04/19/2024 4.4  3.4 - 5.3 mmol/L Final    Carbon Dioxide (CO2) 04/19/2024 26  22 - 29 mmol/L Final    Anion Gap 04/19/2024 10  7 - 15 mmol/L Final    Urea Nitrogen 04/19/2024 21.2 (H)  6.0 - 20.0 mg/dL Final    Creatinine 04/19/2024 0.83  0.51 - 0.95 mg/dL Final    GFR Estimate 04/19/2024 81  >60 mL/min/1.73m2 Final    Calcium 04/19/2024 9.7  8.6 - 10.0 mg/dL Final    Chloride 04/19/2024 104  98 - 107 mmol/L Final    Glucose  04/19/2024 126 (H)  70 - 99 mg/dL Final    Alkaline Phosphatase 04/19/2024 81  40 - 150 U/L Final    Reference intervals for this test were updated on 11/14/2023 to more accurately reflect our healthy population. There may be differences in the flagging of prior results with similar values performed with this method. Interpretation of those prior results can be made in the context of the updated reference intervals.    AST 04/19/2024 35  0 - 45 U/L Final    Reference intervals for this test were updated on 6/12/2023 to more accurately reflect our healthy population. There may be differences in the flagging of prior results with similar values performed with this method. Interpretation of those prior results can be made in the context of the updated reference intervals.    ALT 04/19/2024 90 (H)  0 - 50 U/L Final    Reference intervals for this test were updated on 6/12/2023 to more accurately reflect our healthy population. There may be differences in the flagging of prior results with similar values performed with this method. Interpretation of those prior results can be made in the context of the updated reference intervals.      Protein Total 04/19/2024 7.1  6.4 - 8.3 g/dL Final    Albumin 04/19/2024 4.5  3.5 - 5.2 g/dL Final    Bilirubin Total 04/19/2024 0.3  <=1.2 mg/dL Final    Lipase 04/19/2024 51  13 - 60 U/L Final    Color Urine 04/19/2024 Yellow  Colorless, Straw, Light Yellow, Yellow Final    Appearance Urine 04/19/2024 Clear  Clear Final    Glucose Urine 04/19/2024 Negative  Negative mg/dL Final    Bilirubin Urine 04/19/2024 Negative  Negative Final    Ketones Urine 04/19/2024 Trace (A)  Negative mg/dL Final    Specific Gravity Urine 04/19/2024 1.025  1.005 - 1.030 Final    Blood Urine 04/19/2024 Negative  Negative Final    pH Urine 04/19/2024 5.5  5.0 - 8.0 Final    Protein Albumin Urine 04/19/2024 Negative  Negative mg/dL Final    Urobilinogen Urine 04/19/2024 0.2  0.2, 1.0 E.U./dL Final    Nitrite  Urine 04/19/2024 Negative  Negative Final    Leukocyte Esterase Urine 04/19/2024 Small (A)  Negative Final    WBC Count 04/19/2024 4.7  4.0 - 11.0 10e3/uL Final    RBC Count 04/19/2024 4.84  3.80 - 5.20 10e6/uL Final    Hemoglobin 04/19/2024 14.3  11.7 - 15.7 g/dL Final    Hematocrit 04/19/2024 43.0  35.0 - 47.0 % Final    MCV 04/19/2024 89  78 - 100 fL Final    MCH 04/19/2024 29.5  26.5 - 33.0 pg Final    MCHC 04/19/2024 33.3  31.5 - 36.5 g/dL Final    RDW 04/19/2024 13.1  10.0 - 15.0 % Final    Platelet Count 04/19/2024 346  150 - 450 10e3/uL Final    % Neutrophils 04/19/2024 47  % Final    % Lymphocytes 04/19/2024 40  % Final    % Monocytes 04/19/2024 9  % Final    % Eosinophils 04/19/2024 3  % Final    % Basophils 04/19/2024 1  % Final    % Immature Granulocytes 04/19/2024 0  % Final    Absolute Neutrophils 04/19/2024 2.2  1.6 - 8.3 10e3/uL Final    Absolute Lymphocytes 04/19/2024 1.9  0.8 - 5.3 10e3/uL Final    Absolute Monocytes 04/19/2024 0.4  0.0 - 1.3 10e3/uL Final    Absolute Eosinophils 04/19/2024 0.1  0.0 - 0.7 10e3/uL Final    Absolute Basophils 04/19/2024 0.0  0.0 - 0.2 10e3/uL Final    Absolute Immature Granulocytes 04/19/2024 0.0  <=0.4 10e3/uL Final    Bacteria Urine 04/19/2024 Few (A)  None Seen /HPF Final    RBC Urine 04/19/2024 None Seen  0-2 /HPF /HPF Final    WBC Urine 04/19/2024 0-5  0-5 /HPF /HPF Final    Squamous Epithelials Urine 04/19/2024 Moderate (A)  None Seen /LPF Final         Phone call duration: 12 minutes  Signed Electronically by: Royal Stewart MD

## 2025-01-09 SDOH — HEALTH STABILITY: PHYSICAL HEALTH: ON AVERAGE, HOW MANY DAYS PER WEEK DO YOU ENGAGE IN MODERATE TO STRENUOUS EXERCISE (LIKE A BRISK WALK)?: 3 DAYS

## 2025-01-09 SDOH — HEALTH STABILITY: PHYSICAL HEALTH: ON AVERAGE, HOW MANY MINUTES DO YOU ENGAGE IN EXERCISE AT THIS LEVEL?: 30 MIN

## 2025-01-09 ASSESSMENT — SOCIAL DETERMINANTS OF HEALTH (SDOH): HOW OFTEN DO YOU GET TOGETHER WITH FRIENDS OR RELATIVES?: ONCE A WEEK

## 2025-01-13 ENCOUNTER — OFFICE VISIT (OUTPATIENT)
Dept: FAMILY MEDICINE | Facility: CLINIC | Age: 60
End: 2025-01-13
Payer: COMMERCIAL

## 2025-01-13 VITALS
WEIGHT: 194.6 LBS | HEART RATE: 99 BPM | BODY MASS INDEX: 32.42 KG/M2 | HEIGHT: 65 IN | SYSTOLIC BLOOD PRESSURE: 115 MMHG | RESPIRATION RATE: 16 BRPM | DIASTOLIC BLOOD PRESSURE: 78 MMHG

## 2025-01-13 DIAGNOSIS — E78.00 PURE HYPERCHOLESTEROLEMIA: ICD-10-CM

## 2025-01-13 DIAGNOSIS — Z00.00 ROUTINE GENERAL MEDICAL EXAMINATION AT A HEALTH CARE FACILITY: Primary | ICD-10-CM

## 2025-01-13 DIAGNOSIS — R63.8 ABNORMAL CRAVING: ICD-10-CM

## 2025-01-13 DIAGNOSIS — F33.41 RECURRENT MAJOR DEPRESSIVE DISORDER, IN PARTIAL REMISSION: ICD-10-CM

## 2025-01-13 DIAGNOSIS — Z80.3 FAMILY HISTORY OF MALIGNANT NEOPLASM OF BREAST: ICD-10-CM

## 2025-01-13 DIAGNOSIS — E03.9 HYPOTHYROIDISM, UNSPECIFIED TYPE: ICD-10-CM

## 2025-01-13 RX ORDER — VENLAFAXINE HYDROCHLORIDE 37.5 MG/1
37.5 CAPSULE, EXTENDED RELEASE ORAL DAILY
Qty: 90 CAPSULE | Refills: 3 | Status: CANCELLED | OUTPATIENT
Start: 2025-01-13

## 2025-01-13 RX ORDER — NALTREXONE HYDROCHLORIDE 50 MG/1
50 TABLET, FILM COATED ORAL DAILY
Qty: 90 TABLET | Refills: 3 | Status: SHIPPED | OUTPATIENT
Start: 2025-01-13

## 2025-01-13 RX ORDER — BUPROPION HYDROCHLORIDE 300 MG/1
300 TABLET ORAL EVERY MORNING
Qty: 90 TABLET | Refills: 3 | Status: SHIPPED | OUTPATIENT
Start: 2025-01-13

## 2025-01-13 RX ORDER — LEVOTHYROXINE SODIUM 50 UG/1
50 TABLET ORAL DAILY
Qty: 90 TABLET | Refills: 3 | Status: SHIPPED | OUTPATIENT
Start: 2025-01-13

## 2025-01-13 RX ORDER — ATORVASTATIN CALCIUM 20 MG/1
20 TABLET, FILM COATED ORAL DAILY
Qty: 90 TABLET | Refills: 3 | Status: SHIPPED | OUTPATIENT
Start: 2025-01-13

## 2025-01-13 NOTE — PATIENT INSTRUCTIONS
Patient Education   Preventive Care Advice   This is general advice given by our system to help you stay healthy. However, your care team may have specific advice just for you. Please talk to your care team about your preventive care needs.  Nutrition  Eat 5 or more servings of fruits and vegetables each day.  Try wheat bread, brown rice and whole grain pasta (instead of white bread, rice, and pasta).  Get enough calcium and vitamin D. Check the label on foods and aim for 100% of the RDA (recommended daily allowance).  Lifestyle  Exercise at least 150 minutes each week  (30 minutes a day, 5 days a week).  Do muscle strengthening activities 2 days a week. These help control your weight and prevent disease.  No smoking.  Wear sunscreen to prevent skin cancer.  Have a dental exam and cleaning every 6 months.  Yearly exams  See your health care team every year to talk about:  Any changes in your health.  Any medicines your care team has prescribed.  Preventive care, family planning, and ways to prevent chronic diseases.  Shots (vaccines)   HPV shots (up to age 26), if you've never had them before.  Hepatitis B shots (up to age 59), if you've never had them before.  COVID-19 shot: Get this shot when it's due.  Flu shot: Get a flu shot every year.  Tetanus shot: Get a tetanus shot every 10 years.  Pneumococcal, hepatitis A, and RSV shots: Ask your care team if you need these based on your risk.  Shingles shot (for age 50 and up)  General health tests  Diabetes screening:  Starting at age 35, Get screened for diabetes at least every 3 years.  If you are younger than age 35, ask your care team if you should be screened for diabetes.  Cholesterol test: At age 39, start having a cholesterol test every 5 years, or more often if advised.  Bone density scan (DEXA): At age 50, ask your care team if you should have this scan for osteoporosis (brittle bones).  Hepatitis C: Get tested at least once in your life.  STIs (sexually  transmitted infections)  Before age 24: Ask your care team if you should be screened for STIs.  After age 24: Get screened for STIs if you're at risk. You are at risk for STIs (including HIV) if:  You are sexually active with more than one person.  You don't use condoms every time.  You or a partner was diagnosed with a sexually transmitted infection.  If you are at risk for HIV, ask about PrEP medicine to prevent HIV.  Get tested for HIV at least once in your life, whether you are at risk for HIV or not.  Cancer screening tests  Cervical cancer screening: If you have a cervix, begin getting regular cervical cancer screening tests starting at age 21.  Breast cancer scan (mammogram): If you've ever had breasts, begin having regular mammograms starting at age 40. This is a scan to check for breast cancer.  Colon cancer screening: It is important to start screening for colon cancer at age 45.  Have a colonoscopy test every 10 years (or more often if you're at risk) Or, ask your provider about stool tests like a FIT test every year or Cologuard test every 3 years.  To learn more about your testing options, visit:   .  For help making a decision, visit:   https://bit.ly/dr53834.  Prostate cancer screening test: If you have a prostate, ask your care team if a prostate cancer screening test (PSA) at age 55 is right for you.  Lung cancer screening: If you are a current or former smoker ages 50 to 80, ask your care team if ongoing lung cancer screenings are right for you.  For informational purposes only. Not to replace the advice of your health care provider. Copyright   2023 [facility]. All rights reserved. Clinically reviewed by the [facility] Transitions Program. WhoKnows [id number] - REV 01/24.  Preventing Falls: Care Instructions  Injuries and health problems such as trouble walking or poor eyesight can increase your risk of falling. So can some medicines. But there are things you can do to help  "prevent falls. You can exercise to get stronger. You can also arrange your home to make it safer.    Talk to your doctor about the medicines you take. Ask if any of them increase the risk of falls and whether they can be changed or stopped.   Try to exercise regularly. It can help improve your strength and balance. This can help lower your risk of falling.         Practice fall safety and prevention.   Wear low-heeled shoes that fit well and give your feet good support. Talk to your doctor if you have foot problems that make this hard.  Carry a cellphone or wear a medical alert device that you can use to call for help.  Use stepladders instead of chairs to reach high objects. Don't climb if you're at risk for falls. Ask for help, if needed.  Wear the correct eyeglasses, if you need them.        Make your home safer.   Remove rugs, cords, clutter, and furniture from walkways.  Keep your house well lit. Use night-lights in hallways and bathrooms.  Install and use sturdy handrails on stairways.  Wear nonskid footwear, even inside. Don't walk barefoot or in socks without shoes.        Be safe outside.   Use handrails, curb cuts, and ramps whenever possible.  Keep your hands free by using a shoulder bag or backpack.  Try to walk in well-lit areas. Watch out for uneven ground, changes in pavement, and debris.  Be careful in the winter. Walk on the grass or gravel when sidewalks are slippery. Use de-icer on steps and walkways. Add non-slip devices to shoes.    Put grab bars and nonskid mats in your shower or tub and near the toilet. Try to use a shower chair or bath bench when bathing.   Get into a tub or shower by putting in your weaker leg first. Get out with your strong side first. Have a phone or medical alert device in the bathroom with you.   Where can you learn more?  Go to https://www.Ubookoowise.net/patiented  Enter G117 in the search box to learn more about \"Preventing Falls: Care Instructions.\"  Current as of: " July 31, 2024  Content Version: 14.3    2024 [employer].   Care instructions adapted under license by your healthcare professional. If you have questions about a medical condition or this instruction, always ask your healthcare professional. [employer] disclaims any warranty or liability for your use of this information.    Learning About Stress  What is stress?     Stress is your body's response to a hard situation. Your body can have a physical, emotional, or mental response. Stress is a fact of life for most people, and it affects everyone differently. What causes stress for you may not be stressful for someone else.  A lot of things can cause stress. You may feel stress when you go on a job interview, take a test, or run a race. This kind of short-term stress is normal and even useful. It can help you if you need to work hard or react quickly. For example, stress can help you finish an important job on time.  Long-term stress is caused by ongoing stressful situations or events. Examples of long-term stress include long-term health problems, ongoing problems at work, or conflicts in your family. Long-term stress can harm your health.  How does stress affect your health?  When you are stressed, your body responds as though you are in danger. It makes hormones that speed up your heart, make you breathe faster, and give you a burst of energy. This is called the fight-or-flight stress response. If the stress is over quickly, your body goes back to normal and no harm is done.  But if stress happens too often or lasts too long, it can have bad effects. Long-term stress can make you more likely to get sick, and it can make symptoms of some diseases worse. If you tense up when you are stressed, you may develop neck, shoulder, or low back pain. Stress is linked to high blood pressure and heart disease.  Stress also harms your emotional health. It can make you noland, tense, or depressed. Your  relationships may suffer, and you may not do well at work or school.  What can you do to manage stress?  You can try these things to help manage stress:   Do something active. Exercise or activity can help reduce stress. Walking is a great way to get started. Even everyday activities such as housecleaning or yard work can help.  Try yoga or jeffry chi. These techniques combine exercise and meditation. You may need some training at first to learn them.  Do something you enjoy. For example, listen to music or go to a movie. Practice your hobby or do volunteer work.  Meditate. This can help you relax, because you are not worrying about what happened before or what may happen in the future.  Do guided imagery. Imagine yourself in any setting that helps you feel calm. You can use online videos, books, or a teacher to guide you.  Do breathing exercises. For example:  From a standing position, bend forward from the waist with your knees slightly bent. Let your arms dangle close to the floor.  Breathe in slowly and deeply as you return to a standing position. Roll up slowly and lift your head last.  Hold your breath for just a few seconds in the standing position.  Breathe out slowly and bend forward from the waist.  Let your feelings out. Talk, laugh, cry, and express anger when you need to. Talking with supportive friends or family, a counselor, or a adele leader about your feelings is a healthy way to relieve stress. Avoid discussing your feelings with people who make you feel worse.  Write. It may help to write about things that are bothering you. This helps you find out how much stress you feel and what is causing it. When you know this, you can find better ways to cope.  What can you do to prevent stress?  You might try some of these things to help prevent stress:  Manage your time. This helps you find time to do the things you want and need to do.  Get enough sleep. Your body recovers from the stresses of the day while  "you are sleeping.  Get support. Your family, friends, and community can make a difference in how you experience stress.  Limit your news feed. Avoid or limit time on social media or news that may make you feel stressed.  Do something active. Exercise or activity can help reduce stress. Walking is a great way to get started.  Where can you learn more?  Go to https://www.Bitmenu.net/patiented  Enter N032 in the search box to learn more about \"Learning About Stress.\"  Current as of: October 24, 2023  Content Version: 14.3    2024 [employer].   Care instructions adapted under license by your healthcare professional. If you have questions about a medical condition or this instruction, always ask your healthcare professional. [employer] disclaims any warranty or liability for your use of this information.    Substance Use Disorder: Care Instructions  Overview     You can improve your life and health by stopping your use of alcohol or drugs. When you don't drink or use drugs, you may feel and sleep better. You may get along better with your family, friends, and coworkers. There are medicines and programs that can help with substance use disorder.  How can you care for yourself at home?  Here are some ways to help you stay sober and prevent relapse.  If you have been given medicine to help keep you sober or reduce your cravings, be sure to take it exactly as prescribed.  Talk to your doctor about programs that can help you stop using drugs or drinking alcohol.  Do not keep alcohol or drugs in your home.  Plan ahead. Think about what you'll say if other people ask you to drink or use drugs. Try not to spend time with people who drink or use drugs.  Use the time and money spent on drinking or drugs to do something that's important to you.  Preventing a relapse  Have a plan to deal with relapse. Learn to recognize changes in your thinking that lead you to drink or use drugs. Get help before you start " to drink or use drugs again.  Try to stay away from situations, friends, or places that may lead you to drink or use drugs.  If you feel the need to drink alcohol or use drugs again, seek help right away. Call a trusted friend or family member. Some people get support from organizations such as Narcotics Anonymous or [employer] or from treatment facilities.  If you relapse, get help as soon as you can. Some people make a plan with another person that outlines what they want that person to do for them if they relapse. The plan usually includes how to handle the relapse and who to notify in case of relapse.  Don't give up. Remember that a relapse doesn't mean that you have failed. Use the experience to learn the triggers that lead you to drink or use drugs. Then quit again. Recovery is a lifelong process. Many people have several relapses before they are able to quit for good.  Follow-up care is a key part of your treatment and safety. Be sure to make and go to all appointments, and call your doctor if you are having problems. It's also a good idea to know your test results and keep a list of the medicines you take.  When should you call for help?   Call 911  anytime you think you may need emergency care. For example, call if you or someone else:    Has overdosed or has withdrawal signs. Be sure to tell the emergency workers that you are or someone else is using or trying to quit using drugs. Overdose or withdrawal signs may include:  Losing consciousness.  Seizure.  Seeing or hearing things that aren't there (hallucinations).     Is thinking or talking about suicide or harming others.   Where to get help 24 hours a day, 7 days a week   If you or someone you know talks about suicide, self-harm, a mental health crisis, a substance use crisis, or any other kind of emotional distress, get help right away. You can:    Call the Suicide and Crisis Lifeline at 457.     Call 5-996-336-TALK (1-361.565.5433).     Text HOME  "to 044695 to access the Crisis Text Line.   Consider saving these numbers in your phone.  Go to Grapeshot.PiniOn for more information or to chat online.  Call your doctor now or seek immediate medical care if:    You are having withdrawal symptoms. These may include nausea or vomiting, sweating, shakiness, and anxiety.   Watch closely for changes in your health, and be sure to contact your doctor if:    You have a relapse.     You need more help or support to stop.   Where can you learn more?  Go to https://www.Friday.net/patiented  Enter H573 in the search box to learn more about \"Substance Use Disorder: Care Instructions.\"  Current as of: November 15, 2023  Content Version: 14.3    2024 [employer].   Care instructions adapted under license by your healthcare professional. If you have questions about a medical condition or this instruction, always ask your healthcare professional. [employer] disclaims any warranty or liability for your use of this information.       "

## 2025-01-13 NOTE — PROGRESS NOTES
"Preventive Care Visit  [facility]  Royal Stewart MD, Family Medicine  Jan 13, 2025      Assessment & Plan     ICD-10-CM    1. Routine general medical examination at a health care facility  Z00.00       2. Pure hypercholesterolemia  E78.00 atorvastatin (LIPITOR) 20 MG tablet     Lipid panel reflex to direct LDL Non-fasting     Comprehensive metabolic panel (BMP + Alb, Alk Phos, ALT, AST, Total. Bili, TP)     Lipid panel reflex to direct LDL Non-fasting     Comprehensive metabolic panel (BMP + Alb, Alk Phos, ALT, AST, Total. Bili, TP)      3. Hypothyroidism, unspecified type  E03.9 levothyroxine (SYNTHROID/LEVOTHROID) 50 MCG tablet     TSH WITH FREE T4 REFLEX     TSH WITH FREE T4 REFLEX      4. Abnormal craving  R63.8 naltrexone (DEPADE/REVIA) 50 MG tablet      5. Recurrent major depressive disorder, in partial remission  F33.41       6. Family history of malignant neoplasm of breast  Z80.3 Adult Genetics & Metabolism  Referral        Patient presents today for routine physical exam.  Following issues addressed  1: Hyperlipidemia: Continue atorvastatin.  Check nonfasting lipid profile  2: Major depression partial remission.  We are trying to wean off her venlafaxine.  She is going to take 1 pill every other day for 2 to 3 weeks and then take 1 capsule every 3 days for 2 weeks and then stop.  She will continue on her bupropion.  3: Obesity: Was on phentermine and naltrexone.  Still on naltrexone.  Awaiting appointment with bariatrics as she missed her last visit as she did  miss an alert  4: Hypothyroidism: On levothyroxine supplement check TSH today.      BMI  Estimated body mass index is 32.38 kg/m  as calculated from the following:    Height as of this encounter: 1.651 m (5' 5\").    Weight as of this encounter: 88.3 kg (194 lb 9.6 oz).   Weight management plan: Patient referred to endocrine and/or weight management specialty Discussed healthy diet and exercise " guidelines    Counseling  Appropriate preventive services were addressed with this patient via screening, questionnaire, or discussion as appropriate for fall prevention, nutrition, physical activity, Tobacco-use cessation, social engagement, weight loss and cognition.  Checklist reviewing preventive services available has been given to the patient.  Reviewed patient's diet, addressing concerns and/or questions.   She is at risk for lack of exercise and has been provided with information to increase physical activity for the benefit of her well-being.   She is at risk for psychosocial distress and has been provided with information to reduce risk.       MEDICATIONS:        - Continue other medications without change       -Wean off venlafaxine per plan  Work on weight loss  Regular exercise  See Patient Instructions    Subjective   Nubia is a 59 year old, presenting for the following:  Physical (Pt is NOT fasting today, bags under eyes- thyroid?? Also talk about weight. )        1/13/2025     4:50 PM   Additional Questions   Roomed by Yajaira Quigley CMA          HPI      Health Care Directive  Patient does not have a Health Care Directive: Discussed advance care planning with patient; however, patient declined at this time.      1/9/2025   General Health   How would you rate your overall physical health? (!) FAIR   Feel stress (tense, anxious, or unable to sleep) To some extent   (!) STRESS CONCERN      1/9/2025   Nutrition   Three or more servings of calcium each day? (!) I DON'T KNOW   Diet: Regular (no restrictions)   How many servings of fruit and vegetables per day? (!) 2-3   How many sweetened beverages each day? 0-1         1/9/2025   Exercise   Days per week of moderate/strenous exercise 3 days   Average minutes spent exercising at this level 30 min         1/9/2025   Social Factors   Frequency of gathering with friends or relatives Once a week   Worry food won't last until get money to buy more No   Food not  last or not have enough money for food? No   Do you have housing? (Housing is defined as stable permanent housing and does not include staying ouside in a car, in a tent, in an abandoned building, in an overnight shelter, or couch-surfing.) Yes   Are you worried about losing your housing? No   Lack of transportation? No   Unable to get utilities (heat,electricity)? No         1/13/2025   Fall Risk   Gait Speed Test (Document in seconds) 3.68   Gait Speed Test Interpretation Less than or equal to 5.00 seconds - PASS          1/9/2025   Dental   Dentist two times every year? Yes         1/9/2025   TB Screening   Were you born outside of the [place]? No         Today's PHQ-9 Score:       12/12/2024    11:20 AM   PHQ-9 SCORE   PHQ-9 Total Score 6           1/9/2025   Substance Use   Alcohol more than 3/day or more than 7/wk No   Do you use any other substances recreationally? No    (!) CANNABIS PRODUCTS       Multiple values from one day are sorted in reverse-chronological order     Social History     Tobacco Use    Smoking status: Never     Passive exposure: Past    Smokeless tobacco: Never    Tobacco comments:     I grew up in home with smoker   Substance Use Topics    Alcohol use: Not Currently     Alcohol/week: 0.0 - 2.0 standard drinks of alcohol    Drug use: Never           11/18/2024   LAST FHS-7 RESULTS   1st degree relative breast or ovarian cancer Yes   Any relative bilateral breast cancer Unknown   Any male have breast cancer No   Any ONE woman have BOTH breast AND ovarian cancer No   Any woman with breast cancer before 50yrs Yes   2 or more relatives with breast AND/OR ovarian cancer Yes   2 or more relatives with breast AND/OR bowel cancer Yes        Mammogram Screening - Annual screen due to history of breast cancer, carcinoma in situ, or hyperplasia        1/9/2025   STI Screening   New sexual partner(s) since last STI/HIV test? No     History of abnormal Pap smear: No - age 30- 64 PAP with HPV every 5 years  recommended        Latest Ref Rng & Units 2/10/2023     9:44 AM 3/7/2018     2:27 PM 12/17/2015     8:10 AM   PAP / HPV   PAP  Negative for Intraepithelial Lesion or Malignancy (NILM)  Negative for squamous intraepithelial lesion or malignancy  Electronically signed by Susan Limon CT (ASCP) on 3/15/2018 at  2:04 PM    Negative for squamous intraepithelial lesion or malignancy  Electronically signed by Kaelyn Richmond CT (ASCP) on 12/30/2015 at  3:02 PM      HPV 16 DNA Negative Negative  Negative     HPV 18 DNA Negative Negative  Negative     Other HR HPV Negative Negative  Negative       ASCVD Risk   The 10-year ASCVD risk score (Elizabeth LUCERO, et al., 2019) is: 2.7%    Values used to calculate the score:      Age: 59 years      Sex: Female      Is Non- : No      Diabetic: No      Tobacco smoker: No      Systolic Blood Pressure: 115 mmHg      Is BP treated: No      HDL Cholesterol: 48 mg/dL      Total Cholesterol: 199 mg/dL           Reviewed and updated as needed this visit by Provider   Tobacco  Allergies  Meds  Problems  Med Hx  Surg Hx  Fam Hx            Past Medical History:   Diagnosis Date    Arthritis     Breast cancer (H) 2011    right    Cancer (H) 2010    Breast     Chemical dependency (H) quit 2014    Alcohol    Depression     Depression With Anxiety     Dyslipidemia     Hx antineoplastic chemotherapy 2011    right lumpectomy    Hx of radiation therapy 2011    right breast    Hypothyroidism, unspecified type 10/23/2023    Knee pain     Menstrual disorder      Past Surgical History:   Procedure Laterality Date    BIOPSY BREAST Right 2011    malignant and lumpectomy    BREAST SURGERY      COLONOSCOPY      DILATION AND CURETTAGE      EXCISE BREAST CYST/FIBROADENOMA/TUMOR/DUCT LESION/NIPPLE LESION/AREOLAR LESION Right 2011    Description: Breast Surgery Lumpectomy;  Proc Date: 05/01/2011;     BP Readings from Last 3 Encounters:   01/13/25 115/78   01/31/24 130/70    10/25/23 132/74    Wt Readings from Last 3 Encounters:   01/13/25 88.3 kg (194 lb 9.6 oz)   01/31/24 84.5 kg (186 lb 4.8 oz)   10/25/23 83.5 kg (184 lb)                  Patient Active Problem List   Diagnosis    Alcohol Abuse - Episodic    Depression With Anxiety    Hypercholesterolemia    Anxiety Disorder NOS    Arthritis    Dyslipidemia    Knee pain    Obesity (BMI 30.0-34.9)    Benign neoplasm of ascending colon    Hypothyroidism, unspecified type     Past Surgical History:   Procedure Laterality Date    BIOPSY BREAST Right 2011    malignant and lumpectomy    BREAST SURGERY      COLONOSCOPY      DILATION AND CURETTAGE      EXCISE BREAST CYST/FIBROADENOMA/TUMOR/DUCT LESION/NIPPLE LESION/AREOLAR LESION Right 2011    Description: Breast Surgery Lumpectomy;  Proc Date: 05/01/2011;       Social History     Tobacco Use    Smoking status: Never     Passive exposure: Past    Smokeless tobacco: Never    Tobacco comments:     I grew up in home with smoker   Substance Use Topics    Alcohol use: Not Currently     Alcohol/week: 0.0 - 2.0 standard drinks of alcohol     Family History   Problem Relation Age of Onset    Breast Cancer Paternal Aunt         40s?    Breast Cancer Cousin         Stage 4    Breast Cancer Paternal Aunt         40s?    Dementia Mother 70    Depression Mother     Anxiety Disorder Mother     Mental Illness Mother     Thyroid Disease Mother     Heart Failure Father     Heart Disease Father     Hypertension Father     Hyperlipidemia Father     No Known Problems Sister     Psoriasis Son         with psoriatic arthritis         Current Outpatient Medications   Medication Sig Dispense Refill    atorvastatin (LIPITOR) 20 MG tablet Take 1 tablet (20 mg) by mouth daily. 90 tablet 3    buPROPion (WELLBUTRIN XL) 300 MG 24 hr tablet Take 1 tablet (300 mg) by mouth every morning. 90 tablet 3    levothyroxine (SYNTHROID/LEVOTHROID) 50 MCG tablet Take 1 tablet (50 mcg) by mouth daily. 90 tablet 3    multivitamin  "therapeutic (THERAGRAN) tablet [MULTIVITAMIN THERAPEUTIC (THERAGRAN) TABLET] Take 1 tablet by mouth daily.      naltrexone (DEPADE/REVIA) 50 MG tablet Take 1 tablet (50 mg) by mouth daily. 90 tablet 3    venlafaxine (EFFEXOR XR) 37.5 MG 24 hr capsule Take 1 capsule (37.5 mg) by mouth daily. 90 capsule 0         Review of Systems  Constitutional, HEENT, cardiovascular, pulmonary, GI, , musculoskeletal, neuro, skin, endocrine and psych systems are negative, except as otherwise noted.     Objective    Exam  /78   Pulse 99   Resp 16   Ht 1.651 m (5' 5\")   Wt 88.3 kg (194 lb 9.6 oz)   BMI 32.38 kg/m     Estimated body mass index is 32.38 kg/m  as calculated from the following:    Height as of this encounter: 1.651 m (5' 5\").    Weight as of this encounter: 88.3 kg (194 lb 9.6 oz).    Physical Exam  GENERAL: alert and no distress  NECK: no adenopathy, no asymmetry, masses, or scars  RESP: lungs clear to auscultation - no rales, rhonchi or wheezes  BREAST: normal without masses, tenderness or nipple discharge and no palpable axillary masses or adenopathy  CV: regular rate and rhythm, normal S1 S2, no S3 or S4, no murmur, click or rub, no peripheral edema  ABDOMEN: soft, nontender, no hepatosplenomegaly, no masses and bowel sounds normal  MS: no gross musculoskeletal defects noted, no edema        Signed Electronically by: Royal Stewart MD    "

## 2025-01-14 ENCOUNTER — PATIENT OUTREACH (OUTPATIENT)
Dept: ONCOLOGY | Facility: CLINIC | Age: 60
End: 2025-01-14
Payer: COMMERCIAL

## 2025-01-14 LAB
ALBUMIN SERPL BCG-MCNC: 4.4 G/DL (ref 3.5–5.2)
ALP SERPL-CCNC: 88 U/L (ref 40–150)
ALT SERPL W P-5'-P-CCNC: 40 U/L (ref 0–50)
ANION GAP SERPL CALCULATED.3IONS-SCNC: 12 MMOL/L (ref 7–15)
AST SERPL W P-5'-P-CCNC: 21 U/L (ref 0–45)
BILIRUB SERPL-MCNC: 0.2 MG/DL
BUN SERPL-MCNC: 18.6 MG/DL (ref 8–23)
CALCIUM SERPL-MCNC: 9.7 MG/DL (ref 8.8–10.4)
CHLORIDE SERPL-SCNC: 102 MMOL/L (ref 98–107)
CHOLEST SERPL-MCNC: 219 MG/DL
CREAT SERPL-MCNC: 0.87 MG/DL (ref 0.51–0.95)
EGFRCR SERPLBLD CKD-EPI 2021: 76 ML/MIN/1.73M2
FASTING STATUS PATIENT QL REPORTED: NO
FASTING STATUS PATIENT QL REPORTED: NO
GLUCOSE SERPL-MCNC: 96 MG/DL (ref 70–99)
HCO3 SERPL-SCNC: 26 MMOL/L (ref 22–29)
HDLC SERPL-MCNC: 44 MG/DL
LDLC SERPL CALC-MCNC: 100 MG/DL
NONHDLC SERPL-MCNC: 175 MG/DL
POTASSIUM SERPL-SCNC: 4.1 MMOL/L (ref 3.4–5.3)
PROT SERPL-MCNC: 6.9 G/DL (ref 6.4–8.3)
SODIUM SERPL-SCNC: 140 MMOL/L (ref 135–145)
T4 FREE SERPL-MCNC: 1.09 NG/DL (ref 0.9–1.7)
TRIGL SERPL-MCNC: 374 MG/DL
TSH SERPL DL<=0.005 MIU/L-ACNC: 5.01 UIU/ML (ref 0.3–4.2)

## 2025-01-14 NOTE — PROGRESS NOTES
New Patient Oncology Nurse Navigator Note       oRyal Stewart MD     Referring provider: [facility]      Referring Clinic/Organization: [facility]      Referred to (specialty:) Genetic Counseling and Cancer Risk Management     Requested provider (if applicable): NA     Date Referral Received: January 14, 2025     Evaluation for:  Z80.3 (ICD-10-CM) - Family history of malignant neoplasm of breast       Payor: BCBS / Plan: [place] / Product Type: Indemnity /     January 14, 2025  Referral received and reviewed.  Sent to NPS to schedule.     Francine BONNERN, RN, OCN  Oncology Nurse Navigator   [facility]  Cancer Care Service Line   New Patient Hem/Onc Scheduling / Referrals: [phone] (fax: [phone] )

## 2025-03-03 ENCOUNTER — OFFICE VISIT (OUTPATIENT)
Dept: FAMILY MEDICINE | Facility: CLINIC | Age: 60
End: 2025-03-03
Payer: COMMERCIAL

## 2025-03-03 VITALS
DIASTOLIC BLOOD PRESSURE: 76 MMHG | OXYGEN SATURATION: 97 % | WEIGHT: 184.6 LBS | RESPIRATION RATE: 16 BRPM | SYSTOLIC BLOOD PRESSURE: 114 MMHG | BODY MASS INDEX: 30.75 KG/M2 | HEART RATE: 95 BPM | HEIGHT: 65 IN

## 2025-03-03 DIAGNOSIS — E78.5 DYSLIPIDEMIA: ICD-10-CM

## 2025-03-03 DIAGNOSIS — E03.9 HYPOTHYROIDISM, UNSPECIFIED TYPE: ICD-10-CM

## 2025-03-03 DIAGNOSIS — E66.811 OBESITY (BMI 30.0-34.9): ICD-10-CM

## 2025-03-03 DIAGNOSIS — F41.1 ANXIETY STATE: ICD-10-CM

## 2025-03-03 DIAGNOSIS — F34.1 DYSTHYMIC DISORDER: ICD-10-CM

## 2025-03-03 DIAGNOSIS — R41.89 BRAIN FOG: Primary | ICD-10-CM

## 2025-03-03 LAB
ALBUMIN SERPL BCG-MCNC: 4.5 G/DL (ref 3.5–5.2)
ALP SERPL-CCNC: 81 U/L (ref 40–150)
ALT SERPL W P-5'-P-CCNC: 37 U/L (ref 0–50)
ANION GAP SERPL CALCULATED.3IONS-SCNC: 12 MMOL/L (ref 7–15)
AST SERPL W P-5'-P-CCNC: 26 U/L (ref 0–45)
BASOPHILS # BLD AUTO: 0 10E3/UL (ref 0–0.2)
BASOPHILS NFR BLD AUTO: 1 %
BILIRUB SERPL-MCNC: 0.4 MG/DL
BUN SERPL-MCNC: 18.5 MG/DL (ref 8–23)
CALCIUM SERPL-MCNC: 9.9 MG/DL (ref 8.8–10.4)
CHLORIDE SERPL-SCNC: 106 MMOL/L (ref 98–107)
CREAT SERPL-MCNC: 0.92 MG/DL (ref 0.51–0.95)
EGFRCR SERPLBLD CKD-EPI 2021: 71 ML/MIN/1.73M2
EOSINOPHIL # BLD AUTO: 0.2 10E3/UL (ref 0–0.7)
EOSINOPHIL NFR BLD AUTO: 4 %
ERYTHROCYTE [DISTWIDTH] IN BLOOD BY AUTOMATED COUNT: 13.5 % (ref 10–15)
GLUCOSE SERPL-MCNC: 98 MG/DL (ref 70–99)
HCO3 SERPL-SCNC: 22 MMOL/L (ref 22–29)
HCT VFR BLD AUTO: 42.5 % (ref 35–47)
HGB BLD-MCNC: 14.2 G/DL (ref 11.7–15.7)
IMM GRANULOCYTES # BLD: 0 10E3/UL
IMM GRANULOCYTES NFR BLD: 0 %
LYMPHOCYTES # BLD AUTO: 1.9 10E3/UL (ref 0.8–5.3)
LYMPHOCYTES NFR BLD AUTO: 43 %
MCH RBC QN AUTO: 29.8 PG (ref 26.5–33)
MCHC RBC AUTO-ENTMCNC: 33.4 G/DL (ref 31.5–36.5)
MCV RBC AUTO: 89 FL (ref 78–100)
MONOCYTES # BLD AUTO: 0.4 10E3/UL (ref 0–1.3)
MONOCYTES NFR BLD AUTO: 8 %
NEUTROPHILS # BLD AUTO: 1.9 10E3/UL (ref 1.6–8.3)
NEUTROPHILS NFR BLD AUTO: 44 %
PLATELET # BLD AUTO: 312 10E3/UL (ref 150–450)
POTASSIUM SERPL-SCNC: 4.2 MMOL/L (ref 3.4–5.3)
PROT SERPL-MCNC: 7.3 G/DL (ref 6.4–8.3)
RBC # BLD AUTO: 4.76 10E6/UL (ref 3.8–5.2)
SODIUM SERPL-SCNC: 140 MMOL/L (ref 135–145)
T4 FREE SERPL-MCNC: 1.27 NG/DL (ref 0.9–1.7)
TSH SERPL DL<=0.005 MIU/L-ACNC: 5.8 UIU/ML (ref 0.3–4.2)
WBC # BLD AUTO: 4.3 10E3/UL (ref 4–11)

## 2025-03-03 PROCEDURE — 80053 COMPREHEN METABOLIC PANEL: CPT | Performed by: FAMILY MEDICINE

## 2025-03-03 PROCEDURE — 36415 COLL VENOUS BLD VENIPUNCTURE: CPT | Performed by: FAMILY MEDICINE

## 2025-03-03 PROCEDURE — 3078F DIAST BP <80 MM HG: CPT | Performed by: FAMILY MEDICINE

## 2025-03-03 PROCEDURE — G2211 COMPLEX E/M VISIT ADD ON: HCPCS | Performed by: FAMILY MEDICINE

## 2025-03-03 PROCEDURE — 84439 ASSAY OF FREE THYROXINE: CPT | Performed by: FAMILY MEDICINE

## 2025-03-03 PROCEDURE — 84443 ASSAY THYROID STIM HORMONE: CPT | Performed by: FAMILY MEDICINE

## 2025-03-03 PROCEDURE — 3074F SYST BP LT 130 MM HG: CPT | Performed by: FAMILY MEDICINE

## 2025-03-03 PROCEDURE — 85025 COMPLETE CBC W/AUTO DIFF WBC: CPT | Performed by: FAMILY MEDICINE

## 2025-03-03 PROCEDURE — 99214 OFFICE O/P EST MOD 30 MIN: CPT | Performed by: FAMILY MEDICINE

## 2025-03-03 RX ORDER — LEVOTHYROXINE SODIUM 75 UG/1
75 TABLET ORAL DAILY
Qty: 90 TABLET | Refills: 1 | Status: SHIPPED | OUTPATIENT
Start: 2025-03-03

## 2025-03-03 ASSESSMENT — PATIENT HEALTH QUESTIONNAIRE - PHQ9
SUM OF ALL RESPONSES TO PHQ QUESTIONS 1-9: 5
10. IF YOU CHECKED OFF ANY PROBLEMS, HOW DIFFICULT HAVE THESE PROBLEMS MADE IT FOR YOU TO DO YOUR WORK, TAKE CARE OF THINGS AT HOME, OR GET ALONG WITH OTHER PEOPLE: SOMEWHAT DIFFICULT
SUM OF ALL RESPONSES TO PHQ QUESTIONS 1-9: 5

## 2025-03-03 NOTE — LETTER
My Depression Action Plan  Name: Nubia So   Date of Birth 1965  Date: 3/3/2025    My doctor: Royal Stewart   My clinic: [address] [facility] [id number] [phone]            GREEN    ZONE   Good Control    What it looks like:   Things are going generally well. You have normal ups and downs. You may even feel depressed from time to time, but bad moods usually last less than a day.   What you need to do:  Continue to care for yourself (see self care plan)  Check your depression survival kit and update it as needed  Follow your physician s recommendations including any medication.  Do not stop taking medication unless you consult with your physician first.             YELLOW         ZONE Getting Worse    What it looks like:   Depression is starting to interfere with your life.   It may be hard to get out of bed; you may be starting to isolate yourself from others.  Symptoms of depression are starting to last most all day and this has happened for several days.   You may have suicidal thoughts but they are not constant.   What you need to do:     Call your care team. Your response to treatment will improve if you keep your care team informed of your progress. Yellow periods are signs an adjustment may need to be made.     Continue your self-care.  Just get dressed and ready for the day.  Don't give yourself time to talk yourself out of it.    Talk to someone in your support network.    Open up your Depression Self-Care Plan/Wellness Kit.             RED    ZONE Medical Alert - Get Help    What it looks like:   Depression is seriously interfering with your life.   You may experience these or other symptoms: You can t get out of bed most days, can t work or engage in other necessary activities, you have trouble taking care of basic hygiene, or basic responsibilities, thoughts of suicide or death that will not go away, self-injurious  behavior.     What you need to do:  Call your care team and request a same-day appointment. If they are not available (weekends or after hours) call your local crisis line, emergency room or 911.          Depression Self-Care Plan / Wellness Kit    Many people find that medication and therapy are helpful treatments for managing depression. In addition, making small changes to your everyday life can help to boost your mood and improve your wellbeing. Below are some tips for you to consider. Be sure to talk with your medical provider and/or behavioral health consultant if your symptoms are worsening or not improving.     Sleep   Sleep hygiene  means all of the habits that support good, restful sleep. It includes maintaining a consistent bedtime and wake time, using your bedroom only for sleeping or sex, and keeping the bedroom dark and free of distractions like a computer, smartphone, or television.     Develop a Healthy Routine  Maintain good hygiene. Get out of bed in the morning, make your bed, brush your teeth, take a shower, and get dressed. Don t spend too much time viewing media that makes you feel stressed. Find time to relax each day.    Exercise  Get some form of exercise every day. This will help reduce pain and release endorphins, the  feel good  chemicals in your brain. It can be as simple as just going for a walk or doing some gardening, anything that will get you moving.      Diet  Strive to eat healthy foods, including fruits and vegetables. Drink plenty of water. Avoid excessive sugar, caffeine, alcohol, and other mood-altering substances.     Stay Connected with Others  Stay in touch with friends and family members.    Manage Your Mood  Try deep breathing, massage therapy, biofeedback, or meditation. Take part in fun activities when you can. Try to find something to smile about each day.     Psychotherapy  Be open to working with a therapist if your provider recommends it.     Medication  Be sure to  take your medication as prescribed. Most anti-depressants need to be taken every day. It usually takes several weeks for medications to work. Not all medicines work for all people. It is important to follow-up with your provider to make sure you have a treatment plan that is working for you. Do not stop your medication abruptly without first discussing it with your provider.    Crisis Resources   These hotlines are for both adults and children. They and are open 24 hours a day, 7 days a week unless noted otherwise.    National Suicide Prevention Lifeline   988 or 2-025-278-RZDQ (1649)    Crisis Text Line    www.crisistextline.org  Text HOME to 953062 from anywhere in the [place], anytime, about any type of crisis. A live, trained crisis counselor will receive the text and respond quickly.    [place] Lifeline for LGBTQ Youth  A national crisis intervention and suicide lifeline for LGBTQ youth under 25. Provides a safe place to talk without judgement. Call 1-233.764.2830; text START to 524520 or visit www.thetrevorproject.org to talk to a trained counselor.    For [facility] crisis numbers, visit the [facility] website at:  https://[place].gov/dhs/people-we-serve/adults/health-care/mental-health/resources/crisis-contacts.jsp     negative negative...

## 2025-03-03 NOTE — PROGRESS NOTES
Assessment & Plan     ICD-10-CM    1. Brain fog  R41.89 Comprehensive metabolic panel (BMP + Alb, Alk Phos, ALT, AST, Total. Bili, TP)     TSH with free T4 reflex      2. Obesity (BMI 30.0-34.9)  E66.811 CBC with platelets and differential     Comprehensive metabolic panel (BMP + Alb, Alk Phos, ALT, AST, Total. Bili, TP)     CBC with platelets and differential     Comprehensive metabolic panel (BMP + Alb, Alk Phos, ALT, AST, Total. Bili, TP)      3. Hypothyroidism, unspecified type  E03.9 TSH with free T4 reflex     levothyroxine (SYNTHROID/LEVOTHROID) 75 MCG tablet     TSH with free T4 reflex     TSH with free T4 reflex      4. Anxiety Disorder NOS  F41.1       5. Depression With Anxiety  F34.1       6. Dyslipidemia  E78.5         Patient presents today for concerns of brain fog.  She feels this started after her thyroid went off.  She is currently on thyroid replacement with 50 mcg of levothyroxine.  Following plan of care.  1:Brain fog: With hypothyroidism: Will increase levothyroxine to 75 mcg daily  2: Hypothyroidism: Reviewed previous TSH which was borderline elevated with normal T4.  Increase levothyroxine as above and recheck in 4 to 6 weeks time  3: Obesity: Patient now on semaglutide from [facility].  Has had good response with 10 pound weight loss.  She is happy with the results.  4: Anxiety disorder: On venlafaxine and also going for weekly therapy sessions.  This is from the new job and situation where previous workers are not well coming.  5:  Recurrent depression mild.  Noted fair control with Wellbutrin.  Continue current medication  6: Dyslipidemia: Under better control with atorvastatin 20 mg.  Anticipate further improvement with weight loss.    The longitudinal plan of care for the diagnosis(es)/condition(s) as documented were addressed during this visit. Due to the added complexity in care, I will continue to support Nubia in the subsequent management and with ongoing continuity of  care.      MEDICATIONS:   Orders Placed This Encounter   Medications    semaglutide-weight management (WEGOVY) 0.25 MG/0.5ML pen    levothyroxine (SYNTHROID/LEVOTHROID) 75 MCG tablet     Sig: Take 1 tablet (75 mcg) by mouth daily.     Dispense:  90 tablet     Refill:  1          - Increase levothyroxine to 75 mcg daily       - Continue other medications without change  Regular exercise  See Patient Instructions    Subjective   Nubia is a 59 year old, presenting for the following health issues:  Memory Loss (Feels like she is in a constant kasi fog- feels like this has been on and off since her thyroid has been an issue. Pt also is on ozempic. Talk about depression. )        3/3/2025     8:30 AM   Additional Questions   Roomed by Yajaira Quigley CMA     History of Present Illness       Hypothyroidism:     Since last visit, patient describes the following symptoms::  Anxiety, Constipation, Depression, Dry skin, Fatigue, Hair loss, Weight gain and Weight loss    Weight gain::  No weight gain    She eats 2-3 servings of fruits and vegetables daily.She consumes 0 sweetened beverage(s) daily.She exercises with enough effort to increase her heart rate 10 to 19 minutes per day.  She exercises with enough effort to increase her heart rate 4 days per week.   She is taking medications regularly.          4/11/2024    11:46 AM 12/12/2024    11:20 AM 3/3/2025     8:16 AM   PHQ   PHQ-9 Total Score 5 6 5    Q9: Thoughts of better off dead/self-harm past 2 weeks Not at all Not at all Not at all       Patient-reported      Patient Active Problem List   Diagnosis    Alcohol Abuse - Episodic    Depression With Anxiety    Hypercholesterolemia    Anxiety Disorder NOS    Arthritis    Dyslipidemia    Knee pain    Obesity (BMI 30.0-34.9)    Benign neoplasm of ascending colon    Hypothyroidism, unspecified type     Current Outpatient Medications   Medication Sig Dispense Refill    atorvastatin (LIPITOR) 20 MG tablet Take 1 tablet (20 mg) by  "mouth daily. 90 tablet 3    buPROPion (WELLBUTRIN XL) 300 MG 24 hr tablet Take 1 tablet (300 mg) by mouth every morning. 90 tablet 3    levothyroxine (SYNTHROID/LEVOTHROID) 75 MCG tablet Take 1 tablet (75 mcg) by mouth daily. 90 tablet 1    multivitamin therapeutic (THERAGRAN) tablet [MULTIVITAMIN THERAPEUTIC (THERAGRAN) TABLET] Take 1 tablet by mouth daily.      naltrexone (DEPADE/REVIA) 50 MG tablet Take 1 tablet (50 mg) by mouth daily. 90 tablet 3    semaglutide-weight management (WEGOVY) 0.25 MG/0.5ML pen       venlafaxine (EFFEXOR XR) 37.5 MG 24 hr capsule Take 1 capsule (37.5 mg) by mouth daily. 90 capsule 0     No current facility-administered medications for this visit.         Review of Systems  Constitutional, neuro, ENT, endocrine, pulmonary, cardiac, gastrointestinal, genitourinary, musculoskeletal, integument and psychiatric systems are negative, except as otherwise noted.      Objective    /76 (BP Location: Left arm, Patient Position: Sitting, Cuff Size: Adult Regular)   Pulse 95   Resp 16   Ht 1.651 m (5' 5\")   Wt 83.7 kg (184 lb 9.6 oz)   SpO2 97%   BMI 30.72 kg/m    Body mass index is 30.72 kg/m .  Physical Exam   GENERAL: alert and no distress    Office Visit on 01/13/2025   Component Date Value Ref Range Status    Cholesterol 01/13/2025 219 (H)  <200 mg/dL Final    Triglycerides 01/13/2025 374 (H)  <150 mg/dL Final    Direct Measure HDL 01/13/2025 44 (L)  >=50 mg/dL Final    LDL Cholesterol Calculated 01/13/2025 100 (H)  <100 mg/dL Final    Non HDL Cholesterol 01/13/2025 175 (H)  <130 mg/dL Final    Patient Fasting > 8hrs? 01/13/2025 No   Final    TSH 01/13/2025 5.01 (H)  0.30 - 4.20 uIU/mL Final    Sodium 01/13/2025 140  135 - 145 mmol/L Final    Potassium 01/13/2025 4.1  3.4 - 5.3 mmol/L Final    Carbon Dioxide (CO2) 01/13/2025 26  22 - 29 mmol/L Final    Anion Gap 01/13/2025 12  7 - 15 mmol/L Final    Urea Nitrogen 01/13/2025 18.6  8.0 - 23.0 mg/dL Final    Creatinine 01/13/2025 " 0.87  0.51 - 0.95 mg/dL Final    GFR Estimate 01/13/2025 76  >60 mL/min/1.73m2 Final    eGFR calculated using 2021 CKD-EPI equation.    Calcium 01/13/2025 9.7  8.8 - 10.4 mg/dL Final    Reference intervals for this test were updated on 7/16/2024 to reflect our healthy population more accurately. There may be differences in the flagging of prior results with similar values performed with this method. Those prior results can be interpreted in the context of the updated reference intervals.    Chloride 01/13/2025 102  98 - 107 mmol/L Final    Glucose 01/13/2025 96  70 - 99 mg/dL Final    Alkaline Phosphatase 01/13/2025 88  40 - 150 U/L Final    AST 01/13/2025 21  0 - 45 U/L Final    ALT 01/13/2025 40  0 - 50 U/L Final    Protein Total 01/13/2025 6.9  6.4 - 8.3 g/dL Final    Albumin 01/13/2025 4.4  3.5 - 5.2 g/dL Final    Bilirubin Total 01/13/2025 0.2  <=1.2 mg/dL Final    Patient Fasting > 8hrs? 01/13/2025 No   Final    Free T4 01/13/2025 1.09  0.90 - 1.70 ng/dL Final           Signed Electronically by: Royal Stewart MD

## 2025-03-06 DIAGNOSIS — E03.9 HYPOTHYROIDISM, UNSPECIFIED TYPE: Primary | ICD-10-CM

## 2025-03-12 DIAGNOSIS — F34.1 DYSTHYMIC DISORDER: ICD-10-CM

## 2025-03-12 RX ORDER — VENLAFAXINE HYDROCHLORIDE 37.5 MG/1
37.5 CAPSULE, EXTENDED RELEASE ORAL DAILY
Qty: 90 CAPSULE | Refills: 0 | Status: SHIPPED | OUTPATIENT
Start: 2025-03-12

## 2025-09-02 DIAGNOSIS — E03.9 HYPOTHYROIDISM, UNSPECIFIED TYPE: ICD-10-CM

## 2025-09-02 RX ORDER — LEVOTHYROXINE SODIUM 75 UG/1
75 TABLET ORAL DAILY
Qty: 90 TABLET | Refills: 0 | Status: SHIPPED | OUTPATIENT
Start: 2025-09-02